# Patient Record
Sex: MALE | Race: WHITE | NOT HISPANIC OR LATINO | Employment: FULL TIME | URBAN - METROPOLITAN AREA
[De-identification: names, ages, dates, MRNs, and addresses within clinical notes are randomized per-mention and may not be internally consistent; named-entity substitution may affect disease eponyms.]

---

## 2017-01-17 RX ORDER — BUSPIRONE HYDROCHLORIDE 10 MG/1
TABLET ORAL
Qty: 90 TABLET | Refills: 0 | Status: SHIPPED | OUTPATIENT
Start: 2017-01-17 | End: 2017-03-16 | Stop reason: SDUPTHER

## 2017-01-19 DIAGNOSIS — E78.5 HYPERLIPIDEMIA: ICD-10-CM

## 2017-01-20 RX ORDER — HYDROCHLOROTHIAZIDE 12.5 MG/1
CAPSULE ORAL
Qty: 90 CAPSULE | Refills: 1 | Status: SHIPPED | OUTPATIENT
Start: 2017-01-20 | End: 2017-07-19 | Stop reason: SDUPTHER

## 2017-02-07 ENCOUNTER — TELEPHONE (OUTPATIENT)
Dept: INTERNAL MEDICINE | Facility: CLINIC | Age: 47
End: 2017-02-07

## 2017-02-07 RX ORDER — ATORVASTATIN CALCIUM 40 MG/1
TABLET, FILM COATED ORAL
Qty: 30 TABLET | Refills: 0 | Status: SHIPPED | OUTPATIENT
Start: 2017-02-07 | End: 2017-04-23 | Stop reason: SDUPTHER

## 2017-02-08 NOTE — TELEPHONE ENCOUNTER
Left message for patient to return call to clinic to schedule an appointment for medication refill

## 2017-02-23 ENCOUNTER — HOSPITAL ENCOUNTER (OUTPATIENT)
Dept: RADIOLOGY | Facility: HOSPITAL | Age: 47
Discharge: HOME | End: 2017-02-23
Attending: FAMILY MEDICINE
Payer: COMMERCIAL

## 2017-02-23 ENCOUNTER — OFFICE VISIT (OUTPATIENT)
Dept: INTERNAL MEDICINE | Facility: CLINIC | Age: 47
End: 2017-02-23
Payer: COMMERCIAL

## 2017-02-23 VITALS
SYSTOLIC BLOOD PRESSURE: 126 MMHG | HEIGHT: 73 IN | BODY MASS INDEX: 33.02 KG/M2 | DIASTOLIC BLOOD PRESSURE: 84 MMHG | WEIGHT: 249.13 LBS | HEART RATE: 96 BPM | TEMPERATURE: 98 F

## 2017-02-23 DIAGNOSIS — E78.5 HYPERLIPIDEMIA, UNSPECIFIED HYPERLIPIDEMIA TYPE: ICD-10-CM

## 2017-02-23 DIAGNOSIS — M54.2 NECK PAIN: ICD-10-CM

## 2017-02-23 DIAGNOSIS — I10 ESSENTIAL HYPERTENSION: ICD-10-CM

## 2017-02-23 DIAGNOSIS — F41.9 ANXIETY: ICD-10-CM

## 2017-02-23 DIAGNOSIS — M54.2 NECK PAIN: Primary | ICD-10-CM

## 2017-02-23 PROCEDURE — 99999 PR PBB SHADOW E&M-EST. PATIENT-LVL III: CPT | Mod: PBBFAC,,, | Performed by: FAMILY MEDICINE

## 2017-02-23 PROCEDURE — 72040 X-RAY EXAM NECK SPINE 2-3 VW: CPT | Mod: TC,PO

## 2017-02-23 PROCEDURE — 99214 OFFICE O/P EST MOD 30 MIN: CPT | Mod: S$GLB,,, | Performed by: FAMILY MEDICINE

## 2017-02-23 PROCEDURE — 72040 X-RAY EXAM NECK SPINE 2-3 VW: CPT | Mod: 26,,, | Performed by: RADIOLOGY

## 2017-02-23 RX ORDER — METHOCARBAMOL 750 MG/1
750 TABLET, FILM COATED ORAL 2 TIMES DAILY
Qty: 60 TABLET | Refills: 0 | Status: SHIPPED | OUTPATIENT
Start: 2017-02-23 | End: 2017-03-05

## 2017-02-23 RX ORDER — CYCLOBENZAPRINE HCL 10 MG
10 TABLET ORAL NIGHTLY
Qty: 30 TABLET | Refills: 0 | Status: SHIPPED | OUTPATIENT
Start: 2017-02-23 | End: 2017-03-05

## 2017-02-23 NOTE — MR AVS SNAPSHOT
[facility]  [address] [facility] [place] [address]  Phone: [phone]  Fax: [phone]                  Sae Santiago   2017 10:20 AM   Office Visit    Description:  Male : 1970   Provider:  Floyd Matthews MD   Department:  [facility]           Reason for Visit     Follow-up     Neck Pain           Diagnoses this Visit        Comments    Neck pain    -  Primary Will get an xray of cervical spine.Will do Methocarbamol during 750 mg bid and Flexeril a night.     Essential hypertension     Will continue with the HCTZ    Anxiety     Will continue the Ativan    Hyperlipidemia, unspecified hyperlipidemia type     Will do lipid panel.           To Do List           Goals (5 Years of Data)     None       These Medications        Disp Refills Start End    cyclobenzaprine (FLEXERIL) 10 MG tablet 30 tablet 0 2017 3/5/2017    Take 1 tablet (10 mg total) by mouth every evening. - Oral    Pharmacy: [facility] [address] [place] [address] [place] [id number] [facility] Ph #: [phone]       methocarbamol (ROBAXIN) 750 MG Tab 60 tablet 0 2017 3/5/2017    Take 1 tablet (750 mg total) by mouth 2 (two) times daily. - Oral    Pharmacy: [address] [place] [id number] [facility] 1 SO. Ph #: [phone]         [facility] On Call     [facility] On Call Nurse Care Line -  Assistance  Registered nurses in the [facility] provide clinical advisement, health education, appointment booking, and other advisory services.  Call for this free service at [phone].             Medications           Message regarding Medications     Verify the changes and/or additions to your medication regime listed below are the same as discussed with your clinician today.  If any of these changes or additions are incorrect, please notify your healthcare provider.        START taking these NEW medications        Refills    cyclobenzaprine (FLEXERIL) 10 MG tablet 0     "Sig: Take 1 tablet (10 mg total) by mouth every evening.    Class: Normal    Route: Oral    methocarbamol (ROBAXIN) 750 MG Tab 0    Sig: Take 1 tablet (750 mg total) by mouth 2 (two) times daily.    Class: Normal    Route: Oral      STOP taking these medications     triamcinolone acetonide 0.1% (KENALOG) 0.1 % cream Apply topically 2 (two) times daily.           Verify that the below list of medications is an accurate representation of the medications you are currently taking.  If none reported, the list may be blank. If incorrect, please contact your healthcare provider. Carry this list with you in case of emergency.           Current Medications     atorvastatin (LIPITOR) 40 MG tablet TAKE ONE TABLET BY MOUTH ONCE DAILY    busPIRone (BUSPAR) 10 MG tablet TAKE ONE TABLET BY MOUTH THREE TIMES DAILY    hydrochlorothiazide (MICROZIDE) 12.5 mg capsule TAKE ONE CAPSULE BY MOUTH ONCE DAILY    lorazepam (ATIVAN) 0.5 MG tablet Take 1 tablet (0.5 mg total) by mouth every evening.    losartan (COZAAR) 100 MG tablet TAKE ONE TABLET BY MOUTH ONCE DAILY    metoprolol succinate (TOPROL-XL) 25 MG 24 hr tablet 1 tablet in am and 1 1/2 tab at night    cyclobenzaprine (FLEXERIL) 10 MG tablet Take 1 tablet (10 mg total) by mouth every evening.    methocarbamol (ROBAXIN) 750 MG Tab Take 1 tablet (750 mg total) by mouth 2 (two) times daily.           Clinical Reference Information           Your Vitals Were     BP Pulse Temp    126/84 (BP Location: Right arm, Patient Position: Sitting, BP Method: Manual) 96 97.6 °F (36.4 °C) (Tympanic)    Height Weight BMI    6' 1" (1.854 m) 113 kg (249 lb 1.9 oz) 32.87 kg/m2      Blood Pressure          Most Recent Value    BP  126/84      Allergies as of 2/23/2017     Lexapro [Escitalopram Oxalate]      Immunizations Administered on Date of Encounter - 2/23/2017     None      Orders Placed During Today's Visit     Future Labs/Procedures Expected by Expires    Comprehensive metabolic panel  2/23/2017 " 4/24/2018    Lipid panel  2/23/2017 2/23/2018    X-Ray Cervical Spine AP And Lateral  2/23/2017 2/23/2018      Instructions    Methocarbamol (daytime)    1 tab in morning and noon. Take it at night to see if it will cause sedation.    Flexeril    1 tab at night.       Language Assistance Services     ATTENTION: Language assistance services are available, free of charge. Please call [phone].      ATENCIÓN: Si habla español, tiene a cordon disposición servicios gratuitos de asistencia lingüística. Llame al [phone].     CHÚ Ý: N?u b?n nói Ti?ng Vi?t, có các d?ch v? h? tr? ngôn ng? mi?n phí dành cho b?n. G?i s? [phone].         [facility] complies with applicable Federal civil rights laws and does not discriminate on the basis of race, color, national origin, age, disability, or sex.

## 2017-02-23 NOTE — PROGRESS NOTES
Subjective:       Patient ID: Sae Santiago is a 46 y.o. male.    Chief Complaint: Follow-up and Neck Pain    Neck Pain    This is a new problem. The current episode started 1 to 4 weeks ago. The problem occurs constantly. The problem has been unchanged. The pain is associated with nothing. The pain is present in the anterior neck. The quality of the pain is described as aching. The pain is at a severity of 7/10. The pain is moderate. Nothing aggravates the symptoms. The pain is worse during the day. Pertinent negatives include no fever, headaches, leg pain or syncope. He has tried nothing for the symptoms. The treatment provided moderate relief.     Review of Systems   Constitutional: Negative.  Negative for fever.   Respiratory: Negative.    Cardiovascular: Negative for syncope.   Genitourinary: Negative.    Musculoskeletal: Positive for neck pain and neck stiffness.   Neurological: Negative.  Negative for headaches.       Objective:      Physical Exam   Constitutional: He appears well-developed and well-nourished.   Cardiovascular: Normal rate.        Assessment:       1. Neck pain    2. Essential hypertension    3. Anxiety    4. Hyperlipidemia, unspecified hyperlipidemia type        Plan:       Neck pain  Comments:  Will get an xray of cervical spine.Will do Methocarbamol during 750 mg bid and Flexeril a night.   Orders:  -     X-Ray Cervical Spine AP And Lateral; Future; Expected date: 2/23/17    Essential hypertension  Comments:  Will continue with the HCTZ  Orders:  -     Comprehensive metabolic panel; Future; Expected date: 2/23/17    Anxiety  Comments:  Will continue the Ativan    Hyperlipidemia, unspecified hyperlipidemia type  Comments:  Will do lipid panel.  Orders:  -     Lipid panel; Future; Expected date: 2/23/17    Other orders  -     cyclobenzaprine (FLEXERIL) 10 MG tablet; Take 1 tablet (10 mg total) by mouth every evening.  Dispense: 30 tablet; Refill: 0  -     methocarbamol (ROBAXIN) 750  MG Tab; Take 1 tablet (750 mg total) by mouth 2 (two) times daily.  Dispense: 60 tablet; Refill: 0

## 2017-02-23 NOTE — PATIENT INSTRUCTIONS
Methocarbamol (daytime)    1 tab in morning and noon. Take it at night to see if it will cause sedation.    Flexeril    1 tab at night.

## 2017-03-06 ENCOUNTER — TELEPHONE (OUTPATIENT)
Dept: INTERNAL MEDICINE | Facility: CLINIC | Age: 47
End: 2017-03-06

## 2017-03-06 DIAGNOSIS — R79.89 ELEVATED LIVER FUNCTION TESTS: Primary | ICD-10-CM

## 2017-03-06 NOTE — TELEPHONE ENCOUNTER
----- Message from Lili Lopez sent at 3/6/2017  4:36 PM CST -----  Contact: Patient   Patient returned call, Please call him at [phone].  Regards to results.    Thanks  Td

## 2017-03-16 RX ORDER — BUSPIRONE HYDROCHLORIDE 10 MG/1
TABLET ORAL
Qty: 90 TABLET | Refills: 2 | Status: SHIPPED | OUTPATIENT
Start: 2017-03-16 | End: 2017-05-09 | Stop reason: SDUPTHER

## 2017-04-06 ENCOUNTER — LAB VISIT (OUTPATIENT)
Dept: LAB | Facility: HOSPITAL | Age: 47
End: 2017-04-06
Attending: FAMILY MEDICINE
Payer: COMMERCIAL

## 2017-04-06 DIAGNOSIS — R79.89 ELEVATED LIVER FUNCTION TESTS: ICD-10-CM

## 2017-04-06 LAB
ALT SERPL W/O P-5'-P-CCNC: 53 U/L
AST SERPL-CCNC: 34 U/L

## 2017-04-06 PROCEDURE — 36415 COLL VENOUS BLD VENIPUNCTURE: CPT | Mod: PO

## 2017-04-06 PROCEDURE — 84450 TRANSFERASE (AST) (SGOT): CPT

## 2017-04-06 PROCEDURE — 80074 ACUTE HEPATITIS PANEL: CPT

## 2017-04-06 PROCEDURE — 84460 ALANINE AMINO (ALT) (SGPT): CPT

## 2017-04-07 LAB
HAV IGM SERPL QL IA: NEGATIVE
HBV CORE IGM SERPL QL IA: NEGATIVE
HBV SURFACE AG SERPL QL IA: NEGATIVE
HCV AB SERPL QL IA: NEGATIVE

## 2017-04-16 RX ORDER — LOSARTAN POTASSIUM 100 MG/1
TABLET ORAL
Qty: 30 TABLET | Refills: 6 | Status: SHIPPED | OUTPATIENT
Start: 2017-04-16 | End: 2017-12-12 | Stop reason: SDUPTHER

## 2017-04-23 RX ORDER — ATORVASTATIN CALCIUM 40 MG/1
TABLET, FILM COATED ORAL
Qty: 90 TABLET | Refills: 3 | Status: SHIPPED | OUTPATIENT
Start: 2017-04-23 | End: 2017-12-12 | Stop reason: SDUPTHER

## 2017-05-09 RX ORDER — METOPROLOL SUCCINATE 25 MG/1
TABLET, EXTENDED RELEASE ORAL
Qty: 225 TABLET | Refills: 2 | OUTPATIENT
Start: 2017-05-09

## 2017-05-09 RX ORDER — BUSPIRONE HYDROCHLORIDE 10 MG/1
10 TABLET ORAL 3 TIMES DAILY
Qty: 270 TABLET | Refills: 2 | Status: SHIPPED | OUTPATIENT
Start: 2017-05-09 | End: 2017-08-11 | Stop reason: SDUPTHER

## 2017-05-09 RX ORDER — METOPROLOL SUCCINATE 25 MG/1
TABLET, EXTENDED RELEASE ORAL
Qty: 135 TABLET | Refills: 3 | Status: SHIPPED | OUTPATIENT
Start: 2017-05-09 | End: 2017-05-09 | Stop reason: SDUPTHER

## 2017-05-09 NOTE — TELEPHONE ENCOUNTER
Please resend metoprolol prescription to the [facility] on [address]. Called [facility] and canceled script with them. Pharmacy updated in chart

## 2017-05-09 NOTE — TELEPHONE ENCOUNTER
----- Message from Elisa Byrnes sent at 5/9/2017  4:10 PM CDT -----  Contact: pt  States he needs to speak to the nurse regarding his prescription for his blood pressure medicine, he would like it sent to [facility]. States he would like for all future prescriptions to go to [facility]. Please call pt at [phone]. Thank you

## 2017-05-10 RX ORDER — METOPROLOL SUCCINATE 25 MG/1
TABLET, EXTENDED RELEASE ORAL
Qty: 135 TABLET | Refills: 3 | Status: SHIPPED | OUTPATIENT
Start: 2017-05-10 | End: 2017-12-12 | Stop reason: SDUPTHER

## 2017-06-30 ENCOUNTER — TELEPHONE (OUTPATIENT)
Dept: INTERNAL MEDICINE | Facility: CLINIC | Age: 47
End: 2017-06-30

## 2017-06-30 NOTE — TELEPHONE ENCOUNTER
----- Message from Cayla Francisco sent at 6/30/2017 10:54 AM CDT -----  Contact: self [phone]  States that he needs to speak to nurse regarding a referral for his neck pain. Please call back at [phone]//thank you acc

## 2017-07-03 ENCOUNTER — PATIENT MESSAGE (OUTPATIENT)
Dept: URGENT CARE | Facility: CLINIC | Age: 47
End: 2017-07-03

## 2017-07-03 DIAGNOSIS — M50.30 DDD (DEGENERATIVE DISC DISEASE), CERVICAL: Primary | ICD-10-CM

## 2017-07-19 DIAGNOSIS — E78.5 HYPERLIPIDEMIA: ICD-10-CM

## 2017-07-19 RX ORDER — HYDROCHLOROTHIAZIDE 12.5 MG/1
CAPSULE ORAL
Qty: 90 CAPSULE | Refills: 3 | Status: SHIPPED | OUTPATIENT
Start: 2017-07-19 | End: 2017-12-12 | Stop reason: SDUPTHER

## 2017-08-11 RX ORDER — BUSPIRONE HYDROCHLORIDE 10 MG/1
TABLET ORAL
Qty: 90 TABLET | Refills: 1 | Status: SHIPPED | OUTPATIENT
Start: 2017-08-11 | End: 2017-10-06 | Stop reason: SDUPTHER

## 2017-10-06 RX ORDER — BUSPIRONE HYDROCHLORIDE 10 MG/1
TABLET ORAL
Qty: 270 TABLET | Refills: 1 | Status: SHIPPED | OUTPATIENT
Start: 2017-10-06 | End: 2017-12-12 | Stop reason: SDUPTHER

## 2017-12-11 ENCOUNTER — PATIENT OUTREACH (OUTPATIENT)
Dept: ADMINISTRATIVE | Facility: HOSPITAL | Age: 47
End: 2017-12-11

## 2017-12-12 ENCOUNTER — LAB VISIT (OUTPATIENT)
Dept: LAB | Facility: HOSPITAL | Age: 47
End: 2017-12-12
Attending: FAMILY MEDICINE
Payer: COMMERCIAL

## 2017-12-12 ENCOUNTER — OFFICE VISIT (OUTPATIENT)
Dept: INTERNAL MEDICINE | Facility: CLINIC | Age: 47
End: 2017-12-12
Payer: COMMERCIAL

## 2017-12-12 VITALS
BODY MASS INDEX: 33.31 KG/M2 | HEART RATE: 65 BPM | HEIGHT: 73 IN | DIASTOLIC BLOOD PRESSURE: 80 MMHG | SYSTOLIC BLOOD PRESSURE: 114 MMHG | TEMPERATURE: 96 F | WEIGHT: 251.31 LBS

## 2017-12-12 DIAGNOSIS — Z00.00 ANNUAL PHYSICAL EXAM: Primary | ICD-10-CM

## 2017-12-12 DIAGNOSIS — Z00.00 ANNUAL PHYSICAL EXAM: ICD-10-CM

## 2017-12-12 DIAGNOSIS — E78.5 HYPERLIPIDEMIA, UNSPECIFIED HYPERLIPIDEMIA TYPE: ICD-10-CM

## 2017-12-12 LAB
ALBUMIN SERPL BCP-MCNC: 3.8 G/DL
ALP SERPL-CCNC: 75 U/L
ALT SERPL W/O P-5'-P-CCNC: 60 U/L
ANION GAP SERPL CALC-SCNC: 10 MMOL/L
AST SERPL-CCNC: 38 U/L
BASOPHILS # BLD AUTO: 0.03 K/UL
BASOPHILS NFR BLD: 0.5 %
BILIRUB SERPL-MCNC: 0.9 MG/DL
BUN SERPL-MCNC: 16 MG/DL
CALCIUM SERPL-MCNC: 9.9 MG/DL
CHLORIDE SERPL-SCNC: 101 MMOL/L
CHOLEST SERPL-MCNC: 163 MG/DL
CHOLEST/HDLC SERPL: 4.9 {RATIO}
CO2 SERPL-SCNC: 29 MMOL/L
COMPLEXED PSA SERPL-MCNC: 1 NG/ML
CREAT SERPL-MCNC: 1.1 MG/DL
DIFFERENTIAL METHOD: NORMAL
EOSINOPHIL # BLD AUTO: 0.1 K/UL
EOSINOPHIL NFR BLD: 1.8 %
ERYTHROCYTE [DISTWIDTH] IN BLOOD BY AUTOMATED COUNT: 12.7 %
EST. GFR  (AFRICAN AMERICAN): >60 ML/MIN/1.73 M^2
EST. GFR  (NON AFRICAN AMERICAN): >60 ML/MIN/1.73 M^2
GLUCOSE SERPL-MCNC: 118 MG/DL
HCT VFR BLD AUTO: 42.8 %
HDLC SERPL-MCNC: 33 MG/DL
HDLC SERPL: 20.2 %
HGB BLD-MCNC: 14.2 G/DL
IMM GRANULOCYTES # BLD AUTO: 0.02 K/UL
IMM GRANULOCYTES NFR BLD AUTO: 0.3 %
LDLC SERPL CALC-MCNC: 68.2 MG/DL
LYMPHOCYTES # BLD AUTO: 1.9 K/UL
LYMPHOCYTES NFR BLD: 31 %
MCH RBC QN AUTO: 27.4 PG
MCHC RBC AUTO-ENTMCNC: 33.2 G/DL
MCV RBC AUTO: 83 FL
MONOCYTES # BLD AUTO: 0.5 K/UL
MONOCYTES NFR BLD: 7.6 %
NEUTROPHILS # BLD AUTO: 3.5 K/UL
NEUTROPHILS NFR BLD: 58.8 %
NONHDLC SERPL-MCNC: 130 MG/DL
NRBC BLD-RTO: 0 /100 WBC
PLATELET # BLD AUTO: 245 K/UL
PMV BLD AUTO: 9.9 FL
POTASSIUM SERPL-SCNC: 4.1 MMOL/L
PROT SERPL-MCNC: 7 G/DL
RBC # BLD AUTO: 5.19 M/UL
SODIUM SERPL-SCNC: 140 MMOL/L
TRIGL SERPL-MCNC: 309 MG/DL
WBC # BLD AUTO: 6.03 K/UL

## 2017-12-12 PROCEDURE — 99999 PR PBB SHADOW E&M-EST. PATIENT-LVL III: CPT | Mod: PBBFAC,,, | Performed by: FAMILY MEDICINE

## 2017-12-12 PROCEDURE — 85025 COMPLETE CBC W/AUTO DIFF WBC: CPT

## 2017-12-12 PROCEDURE — 99396 PREV VISIT EST AGE 40-64: CPT | Mod: S$GLB,,, | Performed by: FAMILY MEDICINE

## 2017-12-12 PROCEDURE — 80061 LIPID PANEL: CPT

## 2017-12-12 PROCEDURE — 80053 COMPREHEN METABOLIC PANEL: CPT

## 2017-12-12 PROCEDURE — 84153 ASSAY OF PSA TOTAL: CPT

## 2017-12-12 PROCEDURE — 36415 COLL VENOUS BLD VENIPUNCTURE: CPT | Mod: PO

## 2017-12-12 RX ORDER — METOPROLOL SUCCINATE 25 MG/1
TABLET, EXTENDED RELEASE ORAL
Qty: 135 TABLET | Refills: 3 | Status: SHIPPED | OUTPATIENT
Start: 2017-12-12 | End: 2018-01-05 | Stop reason: SDUPTHER

## 2017-12-12 RX ORDER — ATORVASTATIN CALCIUM 40 MG/1
40 TABLET, FILM COATED ORAL DAILY
Qty: 90 TABLET | Refills: 3 | Status: SHIPPED | OUTPATIENT
Start: 2017-12-12 | End: 2018-01-05 | Stop reason: SDUPTHER

## 2017-12-12 RX ORDER — LORAZEPAM 0.5 MG/1
0.5 TABLET ORAL NIGHTLY
Qty: 30 TABLET | Refills: 0 | Status: SHIPPED | OUTPATIENT
Start: 2017-12-12 | End: 2018-01-05 | Stop reason: SDUPTHER

## 2017-12-12 RX ORDER — HYDROCHLOROTHIAZIDE 12.5 MG/1
12.5 CAPSULE ORAL DAILY
Qty: 90 CAPSULE | Refills: 3 | Status: SHIPPED | OUTPATIENT
Start: 2017-12-12 | End: 2018-01-05 | Stop reason: SDUPTHER

## 2017-12-12 RX ORDER — BUSPIRONE HYDROCHLORIDE 10 MG/1
10 TABLET ORAL 3 TIMES DAILY
Qty: 270 TABLET | Refills: 2 | Status: SHIPPED | OUTPATIENT
Start: 2017-12-12 | End: 2018-01-05 | Stop reason: SDUPTHER

## 2017-12-12 RX ORDER — LOSARTAN POTASSIUM 100 MG/1
100 TABLET ORAL DAILY
Qty: 90 TABLET | Refills: 3 | Status: SHIPPED | OUTPATIENT
Start: 2017-12-12 | End: 2018-01-05 | Stop reason: SDUPTHER

## 2018-01-05 ENCOUNTER — PATIENT MESSAGE (OUTPATIENT)
Dept: INTERNAL MEDICINE | Facility: CLINIC | Age: 48
End: 2018-01-05

## 2018-01-05 DIAGNOSIS — E78.5 HYPERLIPIDEMIA, UNSPECIFIED HYPERLIPIDEMIA TYPE: ICD-10-CM

## 2018-01-05 RX ORDER — BUSPIRONE HYDROCHLORIDE 10 MG/1
10 TABLET ORAL 3 TIMES DAILY
Qty: 270 TABLET | Refills: 2 | Status: SHIPPED | OUTPATIENT
Start: 2018-01-05 | End: 2018-12-12 | Stop reason: SDUPTHER

## 2018-01-05 RX ORDER — METOPROLOL SUCCINATE 25 MG/1
TABLET, EXTENDED RELEASE ORAL
Qty: 135 TABLET | Refills: 3 | Status: SHIPPED | OUTPATIENT
Start: 2018-01-05 | End: 2018-12-12 | Stop reason: SDUPTHER

## 2018-01-05 RX ORDER — LORAZEPAM 0.5 MG/1
0.5 TABLET ORAL NIGHTLY
Qty: 30 TABLET | Refills: 0 | Status: SHIPPED | OUTPATIENT
Start: 2018-01-05 | End: 2018-12-12 | Stop reason: SDUPTHER

## 2018-01-05 RX ORDER — LOSARTAN POTASSIUM 100 MG/1
100 TABLET ORAL DAILY
Qty: 90 TABLET | Refills: 3 | Status: SHIPPED | OUTPATIENT
Start: 2018-01-05 | End: 2018-12-12 | Stop reason: SDUPTHER

## 2018-01-05 RX ORDER — HYDROCHLOROTHIAZIDE 12.5 MG/1
12.5 CAPSULE ORAL DAILY
Qty: 90 CAPSULE | Refills: 3 | Status: SHIPPED | OUTPATIENT
Start: 2018-01-05 | End: 2018-12-12 | Stop reason: SDUPTHER

## 2018-01-05 RX ORDER — ATORVASTATIN CALCIUM 40 MG/1
40 TABLET, FILM COATED ORAL DAILY
Qty: 90 TABLET | Refills: 3 | Status: SHIPPED | OUTPATIENT
Start: 2018-01-05 | End: 2018-12-12 | Stop reason: SDUPTHER

## 2018-03-19 PROBLEM — Z00.00 ANNUAL PHYSICAL EXAM: Status: RESOLVED | Noted: 2017-12-12 | Resolved: 2018-03-19

## 2018-11-15 ENCOUNTER — OFFICE VISIT (OUTPATIENT)
Dept: DERMATOLOGY | Facility: CLINIC | Age: 48
End: 2018-11-15
Payer: COMMERCIAL

## 2018-11-15 DIAGNOSIS — L82.1 SEBORRHEIC KERATOSIS: Primary | ICD-10-CM

## 2018-11-15 DIAGNOSIS — L82.1 STUCCO KERATOSES: ICD-10-CM

## 2018-11-15 DIAGNOSIS — B07.8 OTHER VIRAL WARTS: ICD-10-CM

## 2018-11-15 DIAGNOSIS — L57.0 ACTINIC KERATOSES: ICD-10-CM

## 2018-11-15 PROCEDURE — 17000 DESTRUCT PREMALG LESION: CPT | Mod: 59,S$GLB,, | Performed by: DERMATOLOGY

## 2018-11-15 PROCEDURE — 99203 OFFICE O/P NEW LOW 30 MIN: CPT | Mod: 25,S$GLB,, | Performed by: DERMATOLOGY

## 2018-11-15 PROCEDURE — 17110 DESTRUCTION B9 LES UP TO 14: CPT | Mod: S$GLB,,, | Performed by: DERMATOLOGY

## 2018-11-15 PROCEDURE — 99999 PR PBB SHADOW E&M-EST. PATIENT-LVL II: CPT | Mod: PBBFAC,,, | Performed by: DERMATOLOGY

## 2018-11-15 RX ORDER — AMMONIUM LACTATE 12 G/100G
LOTION TOPICAL
Qty: 225 G | Refills: 11 | Status: ON HOLD | OUTPATIENT
Start: 2018-11-15 | End: 2020-01-10 | Stop reason: HOSPADM

## 2018-11-15 NOTE — PROGRESS NOTES
Subjective:       Patient ID:  Sae Santiago is a 48 y.o. male who presents for   Chief Complaint   Patient presents with    Skin Check     Hx of multiple nevi, SK's, VV, and AK's, last seen on 11/20/14.     History of Present Illness: The patient presents with chief complaint of annual skin check.  Location: face  Duration: 2 years  Signs/Symptoms: scaly    Prior treatments: moisturizer     The patient denies personal history of skin cancer. Father with hx of skin CA.             Review of Systems   Constitutional: Negative for fever and chills.   Gastrointestinal: Negative for nausea and vomiting.   Skin: Negative for daily sunscreen use, activity-related sunscreen use and recent sunburn.   Hematologic/Lymphatic: Does not bruise/bleed easily.        Objective:    Physical Exam   Constitutional: He appears well-developed and well-nourished. No distress.   Neurological: He is alert and oriented to person, place, and time. He is not disoriented.   Psychiatric: He has a normal mood and affect.   Skin:   Areas Examined (abnormalities noted in diagram):   Scalp / Hair Palpated and Inspected  Head / Face Inspection Performed  Neck Inspection Performed  Chest / Axilla Inspection Performed  Abdomen Inspection Performed  Back Inspection Performed  RUE Inspected  LUE Inspection Performed  RLE Inspected  LLE Inspection Performed  Nails and Digits Inspection Performed                   Diagram Legend     Erythematous scaling macule/papule c/w actinic keratosis       Vascular papule c/w angioma      Pigmented verrucoid papule/plaque c/w seborrheic keratosis      Yellow umbilicated papule c/w sebaceous hyperplasia      Irregularly shaped tan macule c/w lentigo     1-2 mm smooth white papules consistent with Milia      Movable subcutaneous cyst with punctum c/w epidermal inclusion cyst      Subcutaneous movable cyst c/w pilar cyst      Firm pink to brown papule c/w dermatofibroma      Pedunculated fleshy papule(s) c/w  skin tag(s)      Evenly pigmented macule c/w junctional nevus     Mildly variegated pigmented, slightly irregular-bordered macule c/w mildly atypical nevus      Flesh colored to evenly pigmented papule c/w intradermal nevus       Pink pearly papule/plaque c/w basal cell carcinoma      Erythematous hyperkeratotic cursted plaque c/w SCC      Surgical scar with no sign of skin cancer recurrence      Open and closed comedones      Inflammatory papules and pustules      Verrucoid papule consistent consistent with wart     Erythematous eczematous patches and plaques     Dystrophic onycholytic nail with subungual debris c/w onychomycosis     Umbilicated papule    Erythematous-base heme-crusted tan verrucoid plaque consistent with inflamed seborrheic keratosis     Erythematous Silvery Scaling Plaque c/w Psoriasis     See annotation      Assessment / Plan:        Seborrheic keratosis  Reassurance given. Discussed diagnosis and that lesions are benign.  AAD handout given.     Actinic keratoses  Cryosurgery Procedure Note    The patient is informed of the precancerous quality and need for treatment of these lesions. After risks, benefits and alternatives explained, including blistering, pain, hyper- and hypopigmentation, patient verbally consents to cryotherapy to precancerous lesions. Liquid nitrogen cryosurgery is applied to the 1 actinic keratoses, as detailed in the physical exam, to produce a freeze injury. The patient is aware that blisters may form and is instructed on wound care with gentle cleansing and use of vaseline ointment to keep moist until healed. The patient is supplied a handout on cryosurgery and is instructed to call if lesions do not completely resolve.    Stucco keratoses  -     ammonium lactate (AMLACTIN) 12 % lotion; Use daily.  Apply to damp skin after bathing.  Dispense: 225 g; Refill: 11  -      Discussed dx, AVS given.    Other viral warts  Cryosurgery procedure note:    After risks, benefits, and  alternatives discussed, including blistering, pain, scar, recurrence, allergy to anesthesia (if given), hyper- and hypopigmentation, verbal consent from the patient is obtained.  Liquid nitrogen cryosurgery is applied to 3 verruca with prior paring, as detailed in the physical exam, to produce a freeze injury. 2 consecutive freeze thaw cycles are applied to each verruca. The patient is aware that blisters (possibly blood blisters) may form.             Follow-up in about 1 year (around 11/15/2019).

## 2018-11-15 NOTE — PATIENT INSTRUCTIONS

## 2018-12-12 ENCOUNTER — LAB VISIT (OUTPATIENT)
Dept: LAB | Facility: HOSPITAL | Age: 48
End: 2018-12-12
Attending: FAMILY MEDICINE
Payer: COMMERCIAL

## 2018-12-12 ENCOUNTER — OFFICE VISIT (OUTPATIENT)
Dept: INTERNAL MEDICINE | Facility: CLINIC | Age: 48
End: 2018-12-12
Payer: COMMERCIAL

## 2018-12-12 VITALS
SYSTOLIC BLOOD PRESSURE: 134 MMHG | WEIGHT: 252.19 LBS | DIASTOLIC BLOOD PRESSURE: 89 MMHG | OXYGEN SATURATION: 98 % | HEART RATE: 67 BPM | HEIGHT: 73 IN | BODY MASS INDEX: 33.42 KG/M2 | TEMPERATURE: 97 F

## 2018-12-12 DIAGNOSIS — Z00.00 ANNUAL PHYSICAL EXAM: ICD-10-CM

## 2018-12-12 DIAGNOSIS — E78.5 HYPERLIPIDEMIA, UNSPECIFIED HYPERLIPIDEMIA TYPE: ICD-10-CM

## 2018-12-12 DIAGNOSIS — Z00.00 ANNUAL PHYSICAL EXAM: Primary | ICD-10-CM

## 2018-12-12 LAB
ALBUMIN SERPL BCP-MCNC: 4.1 G/DL
ALP SERPL-CCNC: 70 U/L
ALT SERPL W/O P-5'-P-CCNC: 70 U/L
ANION GAP SERPL CALC-SCNC: 9 MMOL/L
AST SERPL-CCNC: 41 U/L
BASOPHILS # BLD AUTO: 0.04 K/UL
BASOPHILS NFR BLD: 0.7 %
BILIRUB SERPL-MCNC: 0.9 MG/DL
BUN SERPL-MCNC: 17 MG/DL
CALCIUM SERPL-MCNC: 10.4 MG/DL
CHLORIDE SERPL-SCNC: 98 MMOL/L
CHOLEST SERPL-MCNC: 181 MG/DL
CHOLEST/HDLC SERPL: 4.4 {RATIO}
CO2 SERPL-SCNC: 31 MMOL/L
COMPLEXED PSA SERPL-MCNC: 1.2 NG/ML
CREAT SERPL-MCNC: 1.3 MG/DL
DIFFERENTIAL METHOD: NORMAL
EOSINOPHIL # BLD AUTO: 0.1 K/UL
EOSINOPHIL NFR BLD: 1.6 %
ERYTHROCYTE [DISTWIDTH] IN BLOOD BY AUTOMATED COUNT: 12.9 %
EST. GFR  (AFRICAN AMERICAN): >60 ML/MIN/1.73 M^2
EST. GFR  (NON AFRICAN AMERICAN): >60 ML/MIN/1.73 M^2
GLUCOSE SERPL-MCNC: 124 MG/DL
HCT VFR BLD AUTO: 45.8 %
HDLC SERPL-MCNC: 41 MG/DL
HDLC SERPL: 22.7 %
HGB BLD-MCNC: 15.3 G/DL
IMM GRANULOCYTES # BLD AUTO: 0.02 K/UL
IMM GRANULOCYTES NFR BLD AUTO: 0.3 %
LDLC SERPL CALC-MCNC: 84.2 MG/DL
LYMPHOCYTES # BLD AUTO: 2 K/UL
LYMPHOCYTES NFR BLD: 31.8 %
MCH RBC QN AUTO: 28.6 PG
MCHC RBC AUTO-ENTMCNC: 33.4 G/DL
MCV RBC AUTO: 86 FL
MONOCYTES # BLD AUTO: 0.4 K/UL
MONOCYTES NFR BLD: 6.8 %
NEUTROPHILS # BLD AUTO: 3.6 K/UL
NEUTROPHILS NFR BLD: 58.8 %
NONHDLC SERPL-MCNC: 140 MG/DL
NRBC BLD-RTO: 0 /100 WBC
PLATELET # BLD AUTO: 247 K/UL
PMV BLD AUTO: 10 FL
POTASSIUM SERPL-SCNC: 4.4 MMOL/L
PROT SERPL-MCNC: 7.7 G/DL
RBC # BLD AUTO: 5.35 M/UL
SODIUM SERPL-SCNC: 138 MMOL/L
TRIGL SERPL-MCNC: 279 MG/DL
WBC # BLD AUTO: 6.14 K/UL

## 2018-12-12 PROCEDURE — 3075F SYST BP GE 130 - 139MM HG: CPT | Mod: CPTII,S$GLB,, | Performed by: FAMILY MEDICINE

## 2018-12-12 PROCEDURE — 84153 ASSAY OF PSA TOTAL: CPT

## 2018-12-12 PROCEDURE — 99396 PREV VISIT EST AGE 40-64: CPT | Mod: S$GLB,,, | Performed by: FAMILY MEDICINE

## 2018-12-12 PROCEDURE — 36415 COLL VENOUS BLD VENIPUNCTURE: CPT | Mod: PO

## 2018-12-12 PROCEDURE — 3079F DIAST BP 80-89 MM HG: CPT | Mod: CPTII,S$GLB,, | Performed by: FAMILY MEDICINE

## 2018-12-12 PROCEDURE — 85025 COMPLETE CBC W/AUTO DIFF WBC: CPT

## 2018-12-12 PROCEDURE — 80061 LIPID PANEL: CPT

## 2018-12-12 PROCEDURE — 80053 COMPREHEN METABOLIC PANEL: CPT

## 2018-12-12 PROCEDURE — 99999 PR PBB SHADOW E&M-EST. PATIENT-LVL III: CPT | Mod: PBBFAC,,, | Performed by: FAMILY MEDICINE

## 2018-12-12 RX ORDER — METOPROLOL SUCCINATE 25 MG/1
TABLET, EXTENDED RELEASE ORAL
Qty: 135 TABLET | Refills: 3 | Status: SHIPPED | OUTPATIENT
Start: 2018-12-12 | End: 2020-02-28

## 2018-12-12 RX ORDER — LOSARTAN POTASSIUM 100 MG/1
100 TABLET ORAL DAILY
Qty: 90 TABLET | Refills: 3 | Status: SHIPPED | OUTPATIENT
Start: 2018-12-12 | End: 2020-02-28

## 2018-12-12 RX ORDER — LORAZEPAM 0.5 MG/1
0.5 TABLET ORAL NIGHTLY
Qty: 30 TABLET | Refills: 0 | Status: SHIPPED | OUTPATIENT
Start: 2018-12-12 | End: 2019-12-17 | Stop reason: SDUPTHER

## 2018-12-12 RX ORDER — HYDROCHLOROTHIAZIDE 12.5 MG/1
12.5 CAPSULE ORAL DAILY
Qty: 90 CAPSULE | Refills: 3 | Status: SHIPPED | OUTPATIENT
Start: 2018-12-12 | End: 2020-01-06

## 2018-12-12 RX ORDER — AMOXICILLIN 500 MG
1 CAPSULE ORAL DAILY
Status: ON HOLD | COMMUNITY
End: 2020-01-10 | Stop reason: HOSPADM

## 2018-12-12 RX ORDER — CETIRIZINE HYDROCHLORIDE 10 MG/1
10 TABLET ORAL DAILY
COMMUNITY
End: 2021-02-25

## 2018-12-12 RX ORDER — BUSPIRONE HYDROCHLORIDE 10 MG/1
10 TABLET ORAL 3 TIMES DAILY
Qty: 270 TABLET | Refills: 2 | Status: SHIPPED | OUTPATIENT
Start: 2018-12-12 | End: 2020-05-13

## 2018-12-12 RX ORDER — MULTIVITAMIN
1 TABLET ORAL DAILY
COMMUNITY

## 2018-12-12 RX ORDER — ATORVASTATIN CALCIUM 40 MG/1
40 TABLET, FILM COATED ORAL DAILY
Qty: 90 TABLET | Refills: 3 | Status: SHIPPED | OUTPATIENT
Start: 2018-12-12 | End: 2020-01-06

## 2018-12-12 NOTE — PROGRESS NOTES
Subjective:       Patient ID: Sae Santiago is a 48 y.o. male.    Chief Complaint: Annual Exam          Pt is a 48 year old here for annual exam. Pt does have history of anxiety and HTN with both well controlled. Pt will need colonoscopy next year.       Review of Systems   Constitutional: Negative.    HENT: Negative.    Respiratory: Negative.    Cardiovascular: Negative.    Gastrointestinal: Negative.    Skin: Negative.    Neurological: Negative.    Psychiatric/Behavioral: Negative.        Objective:      Physical Exam   Constitutional: He is oriented to person, place, and time. He appears well-developed and well-nourished.   HENT:   Head: Normocephalic.   Eyes: EOM are normal. Pupils are equal, round, and reactive to light.   Neck: Normal range of motion. Neck supple. No JVD present. No thyromegaly present.   Cardiovascular: Normal rate and regular rhythm. Exam reveals no friction rub.   No murmur heard.  Pulmonary/Chest: Effort normal and breath sounds normal. He has no wheezes. He has no rales.   Abdominal: Soft. Bowel sounds are normal. There is no tenderness. There is no guarding.   Musculoskeletal: Normal range of motion.   Lymphadenopathy:     He has no cervical adenopathy.   Neurological: He is alert and oriented to person, place, and time. He has normal reflexes. He displays normal reflexes. He exhibits normal muscle tone.   Skin: Skin is warm and dry.   Psychiatric: He has a normal mood and affect. His behavior is normal.       Assessment:       1. Annual physical exam        Plan:       Annual physical exam  Comments:  Will do CBC, CMP, lipid and PSA  Orders:  -     CBC auto differential; Future; Expected date: 12/12/2018  -     Comprehensive metabolic panel; Future; Expected date: 12/12/2018  -     Lipid panel; Future; Expected date: 12/12/2018  -     PSA, Screening; Future; Expected date: 12/12/2018

## 2019-01-04 DIAGNOSIS — K76.0 FATTY LIVER: Primary | ICD-10-CM

## 2019-03-18 PROBLEM — Z00.00 ANNUAL PHYSICAL EXAM: Status: RESOLVED | Noted: 2017-12-12 | Resolved: 2019-03-18

## 2019-05-22 ENCOUNTER — TELEPHONE (OUTPATIENT)
Dept: GASTROENTEROLOGY | Facility: CLINIC | Age: 49
End: 2019-05-22

## 2019-05-31 ENCOUNTER — LAB VISIT (OUTPATIENT)
Dept: LAB | Facility: HOSPITAL | Age: 49
End: 2019-05-31
Attending: INTERNAL MEDICINE
Payer: COMMERCIAL

## 2019-05-31 ENCOUNTER — OFFICE VISIT (OUTPATIENT)
Dept: GASTROENTEROLOGY | Facility: CLINIC | Age: 49
End: 2019-05-31
Payer: COMMERCIAL

## 2019-05-31 VITALS
DIASTOLIC BLOOD PRESSURE: 74 MMHG | WEIGHT: 227.06 LBS | HEART RATE: 70 BPM | BODY MASS INDEX: 30.09 KG/M2 | SYSTOLIC BLOOD PRESSURE: 140 MMHG | HEIGHT: 73 IN

## 2019-05-31 DIAGNOSIS — K76.0 FATTY LIVER: ICD-10-CM

## 2019-05-31 DIAGNOSIS — R79.89 ABNORMAL LFTS: Primary | ICD-10-CM

## 2019-05-31 DIAGNOSIS — R79.89 ABNORMAL LFTS: ICD-10-CM

## 2019-05-31 PROCEDURE — 86038 ANTINUCLEAR ANTIBODIES: CPT

## 2019-05-31 PROCEDURE — 3008F BODY MASS INDEX DOCD: CPT | Mod: CPTII,S$GLB,, | Performed by: INTERNAL MEDICINE

## 2019-05-31 PROCEDURE — 82103 ALPHA-1-ANTITRYPSIN TOTAL: CPT

## 2019-05-31 PROCEDURE — 86235 NUCLEAR ANTIGEN ANTIBODY: CPT

## 2019-05-31 PROCEDURE — 3008F PR BODY MASS INDEX (BMI) DOCUMENTED: ICD-10-PCS | Mod: CPTII,S$GLB,, | Performed by: INTERNAL MEDICINE

## 2019-05-31 PROCEDURE — 99999 PR PBB SHADOW E&M-EST. PATIENT-LVL III: ICD-10-PCS | Mod: PBBFAC,,, | Performed by: INTERNAL MEDICINE

## 2019-05-31 PROCEDURE — 99204 PR OFFICE/OUTPT VISIT, NEW, LEVL IV, 45-59 MIN: ICD-10-PCS | Mod: S$GLB,,, | Performed by: INTERNAL MEDICINE

## 2019-05-31 PROCEDURE — 82728 ASSAY OF FERRITIN: CPT

## 2019-05-31 PROCEDURE — 86790 VIRUS ANTIBODY NOS: CPT

## 2019-05-31 PROCEDURE — 3078F DIAST BP <80 MM HG: CPT | Mod: CPTII,S$GLB,, | Performed by: INTERNAL MEDICINE

## 2019-05-31 PROCEDURE — 86706 HEP B SURFACE ANTIBODY: CPT

## 2019-05-31 PROCEDURE — 36415 COLL VENOUS BLD VENIPUNCTURE: CPT

## 2019-05-31 PROCEDURE — 3078F PR MOST RECENT DIASTOLIC BLOOD PRESSURE < 80 MM HG: ICD-10-PCS | Mod: CPTII,S$GLB,, | Performed by: INTERNAL MEDICINE

## 2019-05-31 PROCEDURE — 3077F PR MOST RECENT SYSTOLIC BLOOD PRESSURE >= 140 MM HG: ICD-10-PCS | Mod: CPTII,S$GLB,, | Performed by: INTERNAL MEDICINE

## 2019-05-31 PROCEDURE — 82390 ASSAY OF CERULOPLASMIN: CPT

## 2019-05-31 PROCEDURE — 86256 FLUORESCENT ANTIBODY TITER: CPT | Mod: 91

## 2019-05-31 PROCEDURE — 85025 COMPLETE CBC W/AUTO DIFF WBC: CPT

## 2019-05-31 PROCEDURE — 83540 ASSAY OF IRON: CPT

## 2019-05-31 PROCEDURE — 99204 OFFICE O/P NEW MOD 45 MIN: CPT | Mod: S$GLB,,, | Performed by: INTERNAL MEDICINE

## 2019-05-31 PROCEDURE — 3077F SYST BP >= 140 MM HG: CPT | Mod: CPTII,S$GLB,, | Performed by: INTERNAL MEDICINE

## 2019-05-31 PROCEDURE — 80053 COMPREHEN METABOLIC PANEL: CPT

## 2019-05-31 PROCEDURE — 99999 PR PBB SHADOW E&M-EST. PATIENT-LVL III: CPT | Mod: PBBFAC,,, | Performed by: INTERNAL MEDICINE

## 2019-05-31 NOTE — LETTER
June 5, 2019      Floyd Matthews MD  [address] [facility] [address]           [facility]  [address] [place] [address]  Phone: [phone]  Fax: [phone]          Patient: Sae Santiago   MR Number: [id number]   YOB: 1970   Date of Visit: 5/31/2019       Dear Dr. Floyd Matthews:    Thank you for referring Sae Santiago to me for evaluation. Attached you will find relevant portions of my assessment and plan of care.    If you have questions, please do not hesitate to call me. I look forward to following Sae Santiago along with you.    Sincerely,    Erendira Wilcox MD    Enclosure  CC:  No Recipients    If you would like to receive this communication electronically, please contact [email or web address] or [phone] to request more information on [facility] Link access.    For providers and/or their staff who would like to refer a patient to [facility], please contact us through our one-stop-shop provider referral line, [facility], at [phone].    If you feel you have received this communication in error or would no longer like to receive these types of communications, please e-mail [email or web address]

## 2019-06-01 LAB
A1AT SERPL-MCNC: 130 MG/DL (ref 100–190)
ALBUMIN SERPL BCP-MCNC: 4.2 G/DL (ref 3.5–5.2)
ALP SERPL-CCNC: 64 U/L (ref 55–135)
ALT SERPL W/O P-5'-P-CCNC: 20 U/L (ref 10–44)
ANION GAP SERPL CALC-SCNC: 10 MMOL/L (ref 8–16)
AST SERPL-CCNC: 20 U/L (ref 10–40)
BASOPHILS # BLD AUTO: 0.03 K/UL (ref 0–0.2)
BASOPHILS NFR BLD: 0.5 % (ref 0–1.9)
BILIRUB SERPL-MCNC: 0.3 MG/DL (ref 0.1–1)
BUN SERPL-MCNC: 25 MG/DL (ref 6–20)
CALCIUM SERPL-MCNC: 10.3 MG/DL (ref 8.7–10.5)
CERULOPLASMIN SERPL-MCNC: 27 MG/DL (ref 15–45)
CHLORIDE SERPL-SCNC: 103 MMOL/L (ref 95–110)
CO2 SERPL-SCNC: 26 MMOL/L (ref 23–29)
CREAT SERPL-MCNC: 1 MG/DL (ref 0.5–1.4)
DIFFERENTIAL METHOD: NORMAL
EOSINOPHIL # BLD AUTO: 0.1 K/UL (ref 0–0.5)
EOSINOPHIL NFR BLD: 1.7 % (ref 0–8)
ERYTHROCYTE [DISTWIDTH] IN BLOOD BY AUTOMATED COUNT: 13.1 % (ref 11.5–14.5)
EST. GFR  (AFRICAN AMERICAN): >60 ML/MIN/1.73 M^2
EST. GFR  (NON AFRICAN AMERICAN): >60 ML/MIN/1.73 M^2
FERRITIN SERPL-MCNC: 127 NG/ML (ref 20–300)
GLUCOSE SERPL-MCNC: 91 MG/DL (ref 70–110)
HCT VFR BLD AUTO: 44 % (ref 40–54)
HGB BLD-MCNC: 14.2 G/DL (ref 14–18)
IMM GRANULOCYTES # BLD AUTO: 0.02 K/UL (ref 0–0.04)
IMM GRANULOCYTES NFR BLD AUTO: 0.3 % (ref 0–0.5)
IRON SERPL-MCNC: 49 UG/DL (ref 45–160)
LYMPHOCYTES # BLD AUTO: 2.1 K/UL (ref 1–4.8)
LYMPHOCYTES NFR BLD: 31.5 % (ref 18–48)
MCH RBC QN AUTO: 27.7 PG (ref 27–31)
MCHC RBC AUTO-ENTMCNC: 32.3 G/DL (ref 32–36)
MCV RBC AUTO: 86 FL (ref 82–98)
MONOCYTES # BLD AUTO: 0.5 K/UL (ref 0.3–1)
MONOCYTES NFR BLD: 7.1 % (ref 4–15)
NEUTROPHILS # BLD AUTO: 3.9 K/UL (ref 1.8–7.7)
NEUTROPHILS NFR BLD: 58.9 % (ref 38–73)
NRBC BLD-RTO: 0 /100 WBC
PLATELET # BLD AUTO: 259 K/UL (ref 150–350)
PMV BLD AUTO: 10.7 FL (ref 9.2–12.9)
POTASSIUM SERPL-SCNC: 4 MMOL/L (ref 3.5–5.1)
PROT SERPL-MCNC: 7.2 G/DL (ref 6–8.4)
RBC # BLD AUTO: 5.13 M/UL (ref 4.6–6.2)
SATURATED IRON: 14 % (ref 20–50)
SODIUM SERPL-SCNC: 139 MMOL/L (ref 136–145)
TOTAL IRON BINDING CAPACITY: 358 UG/DL (ref 250–450)
TRANSFERRIN SERPL-MCNC: 242 MG/DL (ref 200–375)
WBC # BLD AUTO: 6.66 K/UL (ref 3.9–12.7)

## 2019-06-03 LAB
ANA SER QL IF: NORMAL
HBV SURFACE AB SER-ACNC: NEGATIVE M[IU]/ML
HEPATITIS A ANTIBODY, IGG: NEGATIVE
MITOCHONDRIA AB TITR SER IF: NORMAL {TITER}

## 2019-06-04 LAB — SMOOTH MUSCLE AB TITR SER IF: NORMAL {TITER}

## 2019-06-05 NOTE — PROGRESS NOTES
Subjective:     Sae Santiago is here for evaluation of fatty liver.    HPI  Sae Santiago is here for evaluation of possible fatty liver disease. He's had chronic elevation of LFTs for over a year.     Metabolic issues- obesity, dyslipidemia  EtOH-no significant EtOH    Overall he feels well. He noticed that his weight was too high.  He has already lost around 50 lb.  He is continue to work on additional weight loss.    Review of Systems   Constitutional: Negative.    HENT: Negative.    Eyes: Negative.    Respiratory: Negative.    Cardiovascular: Negative.    Gastrointestinal: Negative.    Genitourinary: Negative.    Musculoskeletal: Negative.    Skin: Negative.    Neurological: Negative.    Psychiatric/Behavioral: Negative.        Objective:     Physical Exam   Constitutional: He is oriented to person, place, and time. He appears well-developed and well-nourished. No distress.   HENT:   Head: Normocephalic and atraumatic.   Mouth/Throat: Oropharynx is clear and moist. No oropharyngeal exudate.   Eyes: Pupils are equal, round, and reactive to light. Conjunctivae are normal. Right eye exhibits no discharge. Left eye exhibits no discharge. No scleral icterus.   Pulmonary/Chest: Effort normal and breath sounds normal. No respiratory distress. He has no wheezes.   Abdominal: Soft. He exhibits no distension. There is no tenderness.   Musculoskeletal: He exhibits no edema.   Neurological: He is alert and oriented to person, place, and time.   Psychiatric: He has a normal mood and affect. His behavior is normal.   Vitals reviewed.      Computed MELD-Na score unavailable. Necessary lab results were not found in the last year.  Computed MELD score unavailable. Necessary lab results were not found in the last year.    WBC   Date Value Ref Range Status   05/31/2019 6.66 3.90 - 12.70 K/uL Final     Hemoglobin   Date Value Ref Range Status   05/31/2019 14.2 14.0 - 18.0 g/dL Final     Hematocrit   Date Value Ref  Range Status   05/31/2019 44.0 40.0 - 54.0 % Final     Platelets   Date Value Ref Range Status   05/31/2019 259 150 - 350 K/uL Final     BUN, Bld   Date Value Ref Range Status   05/31/2019 25 (H) 6 - 20 mg/dL Final     Creatinine   Date Value Ref Range Status   05/31/2019 1.0 0.5 - 1.4 mg/dL Final     Glucose   Date Value Ref Range Status   05/31/2019 91 70 - 110 mg/dL Final     Calcium   Date Value Ref Range Status   05/31/2019 10.3 8.7 - 10.5 mg/dL Final     Sodium   Date Value Ref Range Status   05/31/2019 139 136 - 145 mmol/L Final     Potassium   Date Value Ref Range Status   05/31/2019 4.0 3.5 - 5.1 mmol/L Final     Chloride   Date Value Ref Range Status   05/31/2019 103 95 - 110 mmol/L Final     AST   Date Value Ref Range Status   05/31/2019 20 10 - 40 U/L Final     ALT   Date Value Ref Range Status   05/31/2019 20 10 - 44 U/L Final     Alkaline Phosphatase   Date Value Ref Range Status   05/31/2019 64 55 - 135 U/L Final     Total Bilirubin   Date Value Ref Range Status   05/31/2019 0.3 0.1 - 1.0 mg/dL Final     Comment:     For infants and newborns, interpretation of results should be based  on gestational age, weight and in agreement with clinical  observations.  Premature Infant recommended reference ranges:  Up to 24 hours.............<8.0 mg/dL  Up to 48 hours............<12.0 mg/dL  3-5 days..................<15.0 mg/dL  6-29 days.................<15.0 mg/dL       Albumin   Date Value Ref Range Status   05/31/2019 4.2 3.5 - 5.2 g/dL Final     No results found for: INR      Assessment/Plan:     1. Abnormal LFTs    2. Fatty liver      Sae Santiago is a 48 y.o. male withFatty Liver      Erendira Wilcox MD      Abnormal LFTs  -     Comprehensive metabolic panel; Future; Expected date: 05/31/2019  -     CBC auto differential; Future; Expected date: 05/31/2019  -     Ferritin; Future; Expected date: 05/31/2019  -     Iron and TIBC; Future; Expected date: 05/31/2019  -     Hepatitis B surface antibody;  Future; Expected date: 05/31/2019  -     US Abdomen Limited; Future; Expected date: 05/31/2019  -     Anti-smooth muscle antibody; Future; Expected date: 05/31/2019  -     JORDIN Screen w/Reflex; Future; Expected date: 05/31/2019  -     Ceruloplasmin; Future; Expected date: 05/31/2019  -     Antimitochondrial antibody; Future; Expected date: 05/31/2019  -     Alpha-1-antitrypsin; Future; Expected date: 05/31/2019  -     Hepatitis A antibody, IgG; Future; Expected date: 05/31/2019  -     US Elastography Liver; Future; Expected date: 05/31/2019  -     Hepatic function panel; Future; Expected date: 05/31/2019    Fatty liver- diagnosis of exclusion, need to make sure additional eval complete if LFTs have been abnormal  - Educated patient on spectrum of fatty liver disease and potential for cirrhosis if CEDEÑO present  - Advised weight loss (10%), strict glycemic control and lipid control (statins are ok if needed, prescribing doctor will need to monitor LFTs per routine)  -     Comprehensive metabolic panel; Future; Expected date: 05/31/2019  -     CBC auto differential; Future; Expected date: 05/31/2019  -     US Abdomen Limited; Future; Expected date: 05/31/2019  -     US Elastography Liver; Future; Expected date: 05/31/2019  -     Hepatic function panel; Future; Expected date: 05/31/2019      RTC in 6 months

## 2019-06-07 ENCOUNTER — PROCEDURE VISIT (OUTPATIENT)
Dept: GASTROENTEROLOGY | Facility: CLINIC | Age: 49
End: 2019-06-07
Attending: INTERNAL MEDICINE
Payer: COMMERCIAL

## 2019-06-07 ENCOUNTER — HOSPITAL ENCOUNTER (OUTPATIENT)
Dept: RADIOLOGY | Facility: HOSPITAL | Age: 49
Discharge: HOME | End: 2019-06-07
Attending: INTERNAL MEDICINE
Payer: COMMERCIAL

## 2019-06-07 VITALS
DIASTOLIC BLOOD PRESSURE: 74 MMHG | BODY MASS INDEX: 30.36 KG/M2 | HEART RATE: 60 BPM | WEIGHT: 229.06 LBS | SYSTOLIC BLOOD PRESSURE: 130 MMHG | HEIGHT: 73 IN

## 2019-06-07 DIAGNOSIS — K76.0 FATTY LIVER: ICD-10-CM

## 2019-06-07 DIAGNOSIS — R79.89 ABNORMAL LFTS: ICD-10-CM

## 2019-06-07 PROCEDURE — 91200 LIVER ELASTOGRAPHY: CPT | Mod: S$GLB,,, | Performed by: INTERNAL MEDICINE

## 2019-06-07 PROCEDURE — 91200 PR LIVER ELASTOGRAPHY W/OUT IMAG W/INTERP & REPORT: ICD-10-PCS | Mod: S$GLB,,, | Performed by: INTERNAL MEDICINE

## 2019-06-07 PROCEDURE — 76705 ECHO EXAM OF ABDOMEN: CPT | Mod: 26,,, | Performed by: RADIOLOGY

## 2019-06-07 PROCEDURE — 76705 US ABDOMEN LIMITED: ICD-10-PCS | Mod: 26,,, | Performed by: RADIOLOGY

## 2019-06-07 PROCEDURE — 76705 ECHO EXAM OF ABDOMEN: CPT | Mod: TC

## 2019-06-16 NOTE — PROCEDURES
Fibroscan Procedure     Name: Sae Santiago  Date of Procedure : 2019   :: Erendira Wilcox MD  Diagnosis: NAFLD    Probe: M    Fibroscan readin.0 KPa    Fibrosis:F3     CAP readin dB/m    Steatosis: :<S1       Interpretation:  Stage III liver fibrosis with no steatosis.      Given no evidence of steatosis and concerns about more advanced fibrosis I would recommend patient proceed with liver biopsy for better evaluation of what is going on in his liver.

## 2019-07-03 ENCOUNTER — PATIENT MESSAGE (OUTPATIENT)
Dept: GASTROENTEROLOGY | Facility: CLINIC | Age: 49
End: 2019-07-03

## 2019-07-13 NOTE — TELEPHONE ENCOUNTER
Spoke with patient about fibroscan. Explained labs have normalized so his weight loss has likely helped with decreasing fat in liver and may have resulted in good steatosis score. Recommended to continue work on weight loss and lipid control. Will get labs in 6 months and visit as previously decided.

## 2019-10-01 DIAGNOSIS — I10 ESSENTIAL HYPERTENSION: Primary | ICD-10-CM

## 2019-10-01 RX ORDER — OLMESARTAN MEDOXOMIL 40 MG/1
40 TABLET ORAL DAILY
Qty: 90 TABLET | Refills: 3 | Status: SHIPPED | OUTPATIENT
Start: 2019-10-01 | End: 2019-11-30

## 2019-11-30 ENCOUNTER — OFFICE VISIT (OUTPATIENT)
Dept: URGENT CARE | Facility: CLINIC | Age: 49
End: 2019-11-30
Payer: COMMERCIAL

## 2019-11-30 ENCOUNTER — HOSPITAL ENCOUNTER (EMERGENCY)
Facility: HOSPITAL | Age: 49
Discharge: HOME | End: 2019-12-01
Attending: EMERGENCY MEDICINE
Payer: COMMERCIAL

## 2019-11-30 VITALS
OXYGEN SATURATION: 99 % | HEIGHT: 73 IN | BODY MASS INDEX: 30.09 KG/M2 | DIASTOLIC BLOOD PRESSURE: 78 MMHG | TEMPERATURE: 99 F | HEART RATE: 64 BPM | WEIGHT: 227.06 LBS | SYSTOLIC BLOOD PRESSURE: 138 MMHG | RESPIRATION RATE: 18 BRPM

## 2019-11-30 VITALS
RESPIRATION RATE: 18 BRPM | HEIGHT: 73 IN | OXYGEN SATURATION: 100 % | HEART RATE: 81 BPM | WEIGHT: 229 LBS | DIASTOLIC BLOOD PRESSURE: 68 MMHG | BODY MASS INDEX: 30.35 KG/M2 | SYSTOLIC BLOOD PRESSURE: 155 MMHG

## 2019-11-30 DIAGNOSIS — M54.12 CERVICAL RADICULOPATHY: Primary | ICD-10-CM

## 2019-11-30 DIAGNOSIS — M54.2 ACUTE NECK PAIN: ICD-10-CM

## 2019-11-30 DIAGNOSIS — M79.603 ARM PAIN: ICD-10-CM

## 2019-11-30 DIAGNOSIS — M25.519 SHOULDER PAIN: ICD-10-CM

## 2019-11-30 DIAGNOSIS — S16.1XXA STRAIN OF NECK MUSCLE, INITIAL ENCOUNTER: Primary | ICD-10-CM

## 2019-11-30 LAB
ANION GAP SERPL CALC-SCNC: 12 MMOL/L (ref 8–16)
BASOPHILS # BLD AUTO: 0.03 K/UL (ref 0–0.2)
BASOPHILS NFR BLD: 0.4 % (ref 0–1.9)
BUN SERPL-MCNC: 34 MG/DL (ref 6–20)
CALCIUM SERPL-MCNC: 9.7 MG/DL (ref 8.7–10.5)
CHLORIDE SERPL-SCNC: 103 MMOL/L (ref 95–110)
CO2 SERPL-SCNC: 26 MMOL/L (ref 23–29)
CREAT SERPL-MCNC: 1.3 MG/DL (ref 0.5–1.4)
DIFFERENTIAL METHOD: ABNORMAL
EOSINOPHIL # BLD AUTO: 0.1 K/UL (ref 0–0.5)
EOSINOPHIL NFR BLD: 0.9 % (ref 0–8)
ERYTHROCYTE [DISTWIDTH] IN BLOOD BY AUTOMATED COUNT: 12.7 % (ref 11.5–14.5)
EST. GFR  (AFRICAN AMERICAN): >60 ML/MIN/1.73 M^2
EST. GFR  (NON AFRICAN AMERICAN): >60 ML/MIN/1.73 M^2
GLUCOSE SERPL-MCNC: 98 MG/DL (ref 70–110)
HCT VFR BLD AUTO: 46.4 % (ref 40–54)
HGB BLD-MCNC: 14.7 G/DL (ref 14–18)
HIV 1+2 AB+HIV1 P24 AG SERPL QL IA: NEGATIVE
IMM GRANULOCYTES # BLD AUTO: 0.03 K/UL (ref 0–0.04)
IMM GRANULOCYTES NFR BLD AUTO: 0.4 % (ref 0–0.5)
LYMPHOCYTES # BLD AUTO: 1.6 K/UL (ref 1–4.8)
LYMPHOCYTES NFR BLD: 20.4 % (ref 18–48)
MCH RBC QN AUTO: 27.5 PG (ref 27–31)
MCHC RBC AUTO-ENTMCNC: 31.7 G/DL (ref 32–36)
MCV RBC AUTO: 87 FL (ref 82–98)
MONOCYTES # BLD AUTO: 0.5 K/UL (ref 0.3–1)
MONOCYTES NFR BLD: 6 % (ref 4–15)
NEUTROPHILS # BLD AUTO: 5.7 K/UL (ref 1.8–7.7)
NEUTROPHILS NFR BLD: 71.9 % (ref 38–73)
NRBC BLD-RTO: 0 /100 WBC
PLATELET # BLD AUTO: 228 K/UL (ref 150–350)
PMV BLD AUTO: 10 FL (ref 9.2–12.9)
POTASSIUM SERPL-SCNC: 4.2 MMOL/L (ref 3.5–5.1)
RBC # BLD AUTO: 5.34 M/UL (ref 4.6–6.2)
SODIUM SERPL-SCNC: 141 MMOL/L (ref 136–145)
TROPONIN I SERPL DL<=0.01 NG/ML-MCNC: <0.006 NG/ML (ref 0–0.03)
WBC # BLD AUTO: 7.96 K/UL (ref 3.9–12.7)

## 2019-11-30 PROCEDURE — 96374 THER/PROPH/DIAG INJ IV PUSH: CPT

## 2019-11-30 PROCEDURE — 85025 COMPLETE CBC W/AUTO DIFF WBC: CPT

## 2019-11-30 PROCEDURE — 96372 THER/PROPH/DIAG INJ SC/IM: CPT | Mod: S$GLB,,, | Performed by: PHYSICIAN ASSISTANT

## 2019-11-30 PROCEDURE — 99285 EMERGENCY DEPT VISIT HI MDM: CPT | Mod: 25

## 2019-11-30 PROCEDURE — 96372 PR INJECTION,THERAP/PROPH/DIAG2ST, IM OR SUBCUT: ICD-10-PCS | Mod: S$GLB,,, | Performed by: PHYSICIAN ASSISTANT

## 2019-11-30 PROCEDURE — 99214 PR OFFICE/OUTPT VISIT, EST, LEVL IV, 30-39 MIN: ICD-10-PCS | Mod: 25,S$GLB,, | Performed by: PHYSICIAN ASSISTANT

## 2019-11-30 PROCEDURE — 63600175 PHARM REV CODE 636 W HCPCS: Performed by: EMERGENCY MEDICINE

## 2019-11-30 PROCEDURE — 80048 BASIC METABOLIC PNL TOTAL CA: CPT

## 2019-11-30 PROCEDURE — 93010 EKG 12-LEAD: ICD-10-PCS | Mod: ,,, | Performed by: INTERNAL MEDICINE

## 2019-11-30 PROCEDURE — 93005 ELECTROCARDIOGRAM TRACING: CPT

## 2019-11-30 PROCEDURE — 99214 OFFICE O/P EST MOD 30 MIN: CPT | Mod: 25,S$GLB,, | Performed by: PHYSICIAN ASSISTANT

## 2019-11-30 PROCEDURE — 84484 ASSAY OF TROPONIN QUANT: CPT

## 2019-11-30 PROCEDURE — 25000003 PHARM REV CODE 250: Performed by: EMERGENCY MEDICINE

## 2019-11-30 PROCEDURE — 86703 HIV-1/HIV-2 1 RESULT ANTBDY: CPT

## 2019-11-30 PROCEDURE — 93010 ELECTROCARDIOGRAM REPORT: CPT | Mod: ,,, | Performed by: INTERNAL MEDICINE

## 2019-11-30 RX ORDER — LIDOCAINE 50 MG/G
1 PATCH TOPICAL
Status: DISCONTINUED | OUTPATIENT
Start: 2019-11-30 | End: 2019-12-01 | Stop reason: HOSPADM

## 2019-11-30 RX ORDER — ACETAMINOPHEN 500 MG
1000 TABLET ORAL
Status: COMPLETED | OUTPATIENT
Start: 2019-11-30 | End: 2019-11-30

## 2019-11-30 RX ORDER — METHYLPREDNISOLONE 4 MG/1
TABLET ORAL
Qty: 1 PACKAGE | Refills: 0 | Status: ON HOLD | OUTPATIENT
Start: 2019-11-30 | End: 2020-01-10 | Stop reason: HOSPADM

## 2019-11-30 RX ORDER — CYCLOBENZAPRINE HCL 10 MG
10 TABLET ORAL NIGHTLY PRN
Qty: 20 TABLET | Refills: 0 | Status: SHIPPED | OUTPATIENT
Start: 2019-11-30 | End: 2020-01-03 | Stop reason: SDUPTHER

## 2019-11-30 RX ORDER — KETOROLAC TROMETHAMINE 30 MG/ML
15 INJECTION, SOLUTION INTRAMUSCULAR; INTRAVENOUS
Status: COMPLETED | OUTPATIENT
Start: 2019-11-30 | End: 2019-11-30

## 2019-11-30 RX ORDER — KETOROLAC TROMETHAMINE 30 MG/ML
30 INJECTION, SOLUTION INTRAMUSCULAR; INTRAVENOUS
Status: COMPLETED | OUTPATIENT
Start: 2019-11-30 | End: 2019-11-30

## 2019-11-30 RX ORDER — GABAPENTIN 300 MG/1
300 CAPSULE ORAL 3 TIMES DAILY
Qty: 90 CAPSULE | Refills: 0 | Status: SHIPPED | OUTPATIENT
Start: 2019-11-30 | End: 2019-12-24

## 2019-11-30 RX ADMIN — KETOROLAC TROMETHAMINE 15 MG: 30 INJECTION, SOLUTION INTRAMUSCULAR; INTRAVENOUS at 10:11

## 2019-11-30 RX ADMIN — LIDOCAINE 1 PATCH: 50 PATCH TOPICAL at 10:11

## 2019-11-30 RX ADMIN — KETOROLAC TROMETHAMINE 30 MG: 30 INJECTION, SOLUTION INTRAMUSCULAR; INTRAVENOUS at 10:11

## 2019-11-30 RX ADMIN — ACETAMINOPHEN 1000 MG: 500 TABLET ORAL at 10:11
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
## 2019-11-30 NOTE — PATIENT INSTRUCTIONS
Toradol shot today - no NSAID for next 24 hours   Medrol dose pack - take steroid in am   Rest   Ice   Follow-up with physiatry if no improvement in symptoms in next 10-14 days   Go to ED if any numbness, weakness, or any other worrisome symptoms

## 2019-12-01 NOTE — ED NOTES
Pt reports L shoulder pain that began appx 2 weeks ago. Went to urgent care this morning and was given shot of Toradol, reports pain subsided. States the pain returned but is radiating down left arm. Asking to have cardiac enzymes drawn. Denies SOB and CP.
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
## 2019-12-02 ENCOUNTER — TELEPHONE (OUTPATIENT)
Dept: PAIN MEDICINE | Facility: CLINIC | Age: 49
End: 2019-12-02

## 2019-12-02 NOTE — TELEPHONE ENCOUNTER
----- Message from Lopez Dickens sent at 12/2/2019  3:49 PM CST -----  Contact: Pt's wife-Mayi  Type:  Sooner Apoointment Request    Caller is requesting a sooner appointment.  Caller declined first available appointment listed below.  Caller will not accept being placed on the waitlist and is requesting a message be sent to doctor.  Name of Caller: Pt'as wife-Camille  When is the first available appointment? 12/05/19  Symptoms: cervical pain/neck pain   Would the patient rather a call back or a response via [facility]? Call back   Best Call Back Number: [phone]  Additional Information: n/a

## 2019-12-02 NOTE — TELEPHONE ENCOUNTER
Contacted pt. Pt requesting work in appt. Offered pt appt tomorrow. Pt gladly accepted. All questions answered.//lp

## 2019-12-03 ENCOUNTER — OFFICE VISIT (OUTPATIENT)
Dept: PAIN MEDICINE | Facility: CLINIC | Age: 49
End: 2019-12-03
Payer: COMMERCIAL

## 2019-12-03 ENCOUNTER — PATIENT MESSAGE (OUTPATIENT)
Dept: PAIN MEDICINE | Facility: CLINIC | Age: 49
End: 2019-12-03

## 2019-12-03 ENCOUNTER — TELEPHONE (OUTPATIENT)
Dept: URGENT CARE | Facility: CLINIC | Age: 49
End: 2019-12-03

## 2019-12-03 VITALS
HEART RATE: 85 BPM | HEIGHT: 73 IN | SYSTOLIC BLOOD PRESSURE: 149 MMHG | BODY MASS INDEX: 29.86 KG/M2 | DIASTOLIC BLOOD PRESSURE: 91 MMHG | WEIGHT: 225.31 LBS

## 2019-12-03 DIAGNOSIS — M50.30 DDD (DEGENERATIVE DISC DISEASE), CERVICAL: ICD-10-CM

## 2019-12-03 DIAGNOSIS — M47.22 CERVICAL SPONDYLOSIS WITH RADICULOPATHY: Primary | ICD-10-CM

## 2019-12-03 DIAGNOSIS — M79.12 MYALGIA OF AUXILIARY MUSCLES, HEAD AND NECK: ICD-10-CM

## 2019-12-03 PROCEDURE — 99999 PR PBB SHADOW E&M-EST. PATIENT-LVL III: CPT | Mod: PBBFAC,,, | Performed by: PHYSICAL MEDICINE & REHABILITATION

## 2019-12-03 PROCEDURE — 99999 PR PBB SHADOW E&M-EST. PATIENT-LVL III: ICD-10-PCS | Mod: PBBFAC,,, | Performed by: PHYSICAL MEDICINE & REHABILITATION

## 2019-12-03 PROCEDURE — 99204 OFFICE O/P NEW MOD 45 MIN: CPT | Mod: S$GLB,,, | Performed by: PHYSICAL MEDICINE & REHABILITATION

## 2019-12-03 PROCEDURE — 99204 PR OFFICE/OUTPT VISIT, NEW, LEVL IV, 45-59 MIN: ICD-10-PCS | Mod: S$GLB,,, | Performed by: PHYSICAL MEDICINE & REHABILITATION

## 2019-12-03 RX ORDER — AMITRIPTYLINE HYDROCHLORIDE 25 MG/1
25 TABLET, FILM COATED ORAL NIGHTLY PRN
Qty: 30 TABLET | Refills: 2 | Status: ON HOLD | OUTPATIENT
Start: 2019-12-03 | End: 2020-01-10 | Stop reason: HOSPADM

## 2019-12-03 NOTE — LETTER
December 3, 2019      [facility]  [address]  [place] [address]  Phone: [phone]  Fax: [phone]       Patient: Sae Santiago   YOB: 1970  Date of Visit: 12/03/2019    To Whom It May Concern:    Robin Santiago  was at [facility] on 12/03/2019. He may return to work/school on 12/9/19 restricted to light duty. If you have any questions or concerns, or if I can be of further assistance, please do not hesitate to contact me.    Sincerely,    Teo Morton MD

## 2019-12-03 NOTE — PATIENT INSTRUCTIONS
- Schedule for a MRI of the cervical spine to further evaluate source of pain likely cervical radiculopathy secondary to nerve root impingement at the C6-7 level.  -start Elavil (Amitriptyline) 25 mg nightly as needed for pain and sleep  - Continue other current medications as prescribed.  Advised to take Tylenol 1000 mg paired with ibuprofen 400-600 mg simultaneously as this may provide better pain relief.  - Continue home based exercises and discussed the importance of a healthy and active lifestyle  -we will call you after the MRI to go over the results and future plan of care.    Please do not hesitate to contact the clinic if you have any questions regarding your treatment plan.     Teo Morton MD  Interventional Pain Medicine  [facility]
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
## 2019-12-04 ENCOUNTER — PATIENT MESSAGE (OUTPATIENT)
Dept: PAIN MEDICINE | Facility: HOSPITAL | Age: 49
End: 2019-12-04

## 2019-12-04 ENCOUNTER — HOSPITAL ENCOUNTER (OUTPATIENT)
Dept: RADIOLOGY | Facility: HOSPITAL | Age: 49
Discharge: HOME | End: 2019-12-04
Attending: PHYSICAL MEDICINE & REHABILITATION
Payer: COMMERCIAL

## 2019-12-04 ENCOUNTER — PATIENT MESSAGE (OUTPATIENT)
Dept: INTERNAL MEDICINE | Facility: CLINIC | Age: 49
End: 2019-12-04

## 2019-12-04 DIAGNOSIS — M47.22 CERVICAL SPONDYLOSIS WITH RADICULOPATHY: Primary | ICD-10-CM

## 2019-12-04 DIAGNOSIS — M50.30 DDD (DEGENERATIVE DISC DISEASE), CERVICAL: ICD-10-CM

## 2019-12-04 DIAGNOSIS — M79.12 MYALGIA OF AUXILIARY MUSCLES, HEAD AND NECK: ICD-10-CM

## 2019-12-04 DIAGNOSIS — M47.22 CERVICAL SPONDYLOSIS WITH RADICULOPATHY: ICD-10-CM

## 2019-12-04 PROCEDURE — 72141 MRI NECK SPINE W/O DYE: CPT | Mod: TC

## 2019-12-04 NOTE — PROGRESS NOTES
Contacted the patient today to review results of cervical spine MRI which demonstrated a C6-7 disc herniation with severe central spinal stenosis resulting in impingement on the cord but no abnormal signal.  At C7-T1, there is no evidence of central canal stenosis or foraminal narrowing.  Therefore, I discussed the option of trialing a C7-T1 interlaminar epidural steroid injection to provide relief of his pain. He expressed understanding of the MRI findings and elected to pursue cervical epidural steroid injection. He would have to hold his fish oil and any NSAIDs for 7 days prior to injection to minimize the risk of bleeding.    Teo Morton MD  Interventional Pain Medicine  [facility] [place]

## 2019-12-06 NOTE — PRE ADMISSION SCREENING
Spoke with  Sae  regarding procedure scheduled on 12/13  Arrival time 0810  Has patient been sick with fever or on antibiotics within the last 7 days? no  Has patient received a vaccination within the last 7 days? no  Has the patient stopped all medications as directed? yes  Does patient have a pacemaker and or defibrillator? no  Does the patient have a ride to and from procedure and someone reliable to remain with patient? yes  Is the patient diabetic? no  Does the patient have sleep apnea? no Or use O2 at home? no  Is the patient receiving sedation? Yes  Is the patient instructed to remain NPO beginning at midnight the night before their procedure? yes  Procedure location confirmed with patient? yes

## 2019-12-11 ENCOUNTER — OFFICE VISIT (OUTPATIENT)
Dept: NEUROSURGERY | Facility: CLINIC | Age: 49
End: 2019-12-11
Payer: COMMERCIAL

## 2019-12-11 ENCOUNTER — PATIENT MESSAGE (OUTPATIENT)
Dept: NEUROSURGERY | Facility: CLINIC | Age: 49
End: 2019-12-11

## 2019-12-11 ENCOUNTER — TELEPHONE (OUTPATIENT)
Dept: NEUROSURGERY | Facility: CLINIC | Age: 49
End: 2019-12-11

## 2019-12-11 VITALS
WEIGHT: 219.13 LBS | SYSTOLIC BLOOD PRESSURE: 150 MMHG | HEIGHT: 73 IN | BODY MASS INDEX: 29.04 KG/M2 | RESPIRATION RATE: 18 BRPM | HEART RATE: 98 BPM | DIASTOLIC BLOOD PRESSURE: 84 MMHG

## 2019-12-11 DIAGNOSIS — R93.7 ABNORMAL X-RAY OF CERVICAL SPINE: Primary | ICD-10-CM

## 2019-12-11 DIAGNOSIS — M54.12 CERVICAL RADICULOPATHY: ICD-10-CM

## 2019-12-11 DIAGNOSIS — M50.30 DEGENERATIVE DISC DISEASE, CERVICAL: Primary | ICD-10-CM

## 2019-12-11 PROCEDURE — 3079F DIAST BP 80-89 MM HG: CPT | Mod: CPTII,S$GLB,, | Performed by: NEUROLOGICAL SURGERY

## 2019-12-11 PROCEDURE — 3079F PR MOST RECENT DIASTOLIC BLOOD PRESSURE 80-89 MM HG: ICD-10-PCS | Mod: CPTII,S$GLB,, | Performed by: NEUROLOGICAL SURGERY

## 2019-12-11 PROCEDURE — 99204 OFFICE O/P NEW MOD 45 MIN: CPT | Mod: S$GLB,,, | Performed by: NEUROLOGICAL SURGERY

## 2019-12-11 PROCEDURE — 99999 PR PBB SHADOW E&M-EST. PATIENT-LVL III: CPT | Mod: PBBFAC,,, | Performed by: NEUROLOGICAL SURGERY

## 2019-12-11 PROCEDURE — 3008F BODY MASS INDEX DOCD: CPT | Mod: CPTII,S$GLB,, | Performed by: NEUROLOGICAL SURGERY

## 2019-12-11 PROCEDURE — 3077F SYST BP >= 140 MM HG: CPT | Mod: CPTII,S$GLB,, | Performed by: NEUROLOGICAL SURGERY

## 2019-12-11 PROCEDURE — 99999 PR PBB SHADOW E&M-EST. PATIENT-LVL III: ICD-10-PCS | Mod: PBBFAC,,, | Performed by: NEUROLOGICAL SURGERY

## 2019-12-11 PROCEDURE — 3077F PR MOST RECENT SYSTOLIC BLOOD PRESSURE >= 140 MM HG: ICD-10-PCS | Mod: CPTII,S$GLB,, | Performed by: NEUROLOGICAL SURGERY

## 2019-12-11 PROCEDURE — 3008F PR BODY MASS INDEX (BMI) DOCUMENTED: ICD-10-PCS | Mod: CPTII,S$GLB,, | Performed by: NEUROLOGICAL SURGERY

## 2019-12-11 PROCEDURE — 99204 PR OFFICE/OUTPT VISIT, NEW, LEVL IV, 45-59 MIN: ICD-10-PCS | Mod: S$GLB,,, | Performed by: NEUROLOGICAL SURGERY

## 2019-12-11 RX ORDER — TRAMADOL HYDROCHLORIDE 50 MG/1
50 TABLET ORAL EVERY 6 HOURS PRN
Qty: 60 TABLET | Refills: 0 | Status: ON HOLD | OUTPATIENT
Start: 2019-12-11 | End: 2020-01-10 | Stop reason: HOSPADM

## 2019-12-11 NOTE — TELEPHONE ENCOUNTER
Restrictions noted on update letter patient aware via patient portal of update.     ----- Message from Coni Trevino sent at 12/11/2019  2:12 PM CST -----  Contact: [phone]/self  Patient did not get the rx for his tramadol. He also needs something written that he can only lift up to 10 lbs . Please call him to discuss. You can send it through the portal also. Thanks

## 2019-12-12 ENCOUNTER — TELEPHONE (OUTPATIENT)
Dept: NEUROSURGERY | Facility: CLINIC | Age: 49
End: 2019-12-12

## 2019-12-12 ENCOUNTER — PATIENT MESSAGE (OUTPATIENT)
Dept: NEUROSURGERY | Facility: CLINIC | Age: 49
End: 2019-12-12

## 2019-12-12 ENCOUNTER — PATIENT MESSAGE (OUTPATIENT)
Dept: INTERNAL MEDICINE | Facility: CLINIC | Age: 49
End: 2019-12-12

## 2019-12-12 ENCOUNTER — PATIENT MESSAGE (OUTPATIENT)
Dept: PAIN MEDICINE | Facility: HOSPITAL | Age: 49
End: 2019-12-12

## 2019-12-12 NOTE — TELEPHONE ENCOUNTER
Spoke with pt in reference to the message below, informed pt when scheduling Ct it may have to be pushed out a little further pt wanted try for closer time since he was off, pt was fine with changes//ns     ----- Message from Lizzy Rao RT sent at 12/12/2019 10:12 AM CST -----  Contact: Radiology  Good Morning, patient has a CT Scan scheduled for tomorrow, and we need to allow time for insurance approval, is the test medically urgent? if not medically urgent we may need to re-schedule to allow Insurance time to approve test. If you have any questions call ext [phone].  Thanks  [facility]    IF TEST IS NOT MEDICALLY URGENT, PLEASE RE-SCHEDULE PATIENT AT LEAST 3-4 DAYS OUT TO ALLOW TIME FOR INSURANCE TO PROCESS APPROVAL

## 2019-12-13 ENCOUNTER — HOSPITAL ENCOUNTER (OUTPATIENT)
Facility: HOSPITAL | Age: 49
Discharge: HOME | End: 2019-12-13
Attending: PAIN MEDICINE | Admitting: PAIN MEDICINE
Payer: COMMERCIAL

## 2019-12-13 VITALS
BODY MASS INDEX: 28.93 KG/M2 | OXYGEN SATURATION: 97 % | WEIGHT: 218.25 LBS | HEIGHT: 73 IN | TEMPERATURE: 99 F | DIASTOLIC BLOOD PRESSURE: 74 MMHG | RESPIRATION RATE: 20 BRPM | SYSTOLIC BLOOD PRESSURE: 137 MMHG | HEART RATE: 71 BPM

## 2019-12-13 DIAGNOSIS — M54.12 CERVICAL RADICULOPATHY: Primary | ICD-10-CM

## 2019-12-13 PROCEDURE — 99152 MOD SED SAME PHYS/QHP 5/>YRS: CPT | Mod: ,,, | Performed by: PAIN MEDICINE

## 2019-12-13 PROCEDURE — 62321 PR INJ CERV/THORAC, W/GUIDANCE: ICD-10-PCS | Mod: ,,, | Performed by: PAIN MEDICINE

## 2019-12-13 PROCEDURE — 62320 NJX INTERLAMINAR CRV/THRC: CPT | Performed by: PAIN MEDICINE

## 2019-12-13 PROCEDURE — 63600175 PHARM REV CODE 636 W HCPCS: Performed by: PAIN MEDICINE

## 2019-12-13 PROCEDURE — 27000221 HC OXYGEN, UP TO 24 HOURS

## 2019-12-13 PROCEDURE — 99152 PR MOD CONSCIOUS SEDATION, SAME PHYS, 5+ YRS, FIRST 15 MIN: ICD-10-PCS | Mod: ,,, | Performed by: PAIN MEDICINE

## 2019-12-13 PROCEDURE — 62321 NJX INTERLAMINAR CRV/THRC: CPT | Performed by: PAIN MEDICINE

## 2019-12-13 PROCEDURE — 25500020 PHARM REV CODE 255: Performed by: PAIN MEDICINE

## 2019-12-13 PROCEDURE — 62321 NJX INTERLAMINAR CRV/THRC: CPT | Mod: ,,, | Performed by: PAIN MEDICINE

## 2019-12-13 RX ORDER — LIDOCAINE HYDROCHLORIDE 10 MG/ML
INJECTION INFILTRATION; PERINEURAL
Status: DISCONTINUED | OUTPATIENT
Start: 2019-12-13 | End: 2019-12-13 | Stop reason: HOSPADM

## 2019-12-13 RX ORDER — DEXAMETHASONE SODIUM PHOSPHATE 10 MG/ML
INJECTION INTRAMUSCULAR; INTRAVENOUS
Status: DISCONTINUED | OUTPATIENT
Start: 2019-12-13 | End: 2019-12-13 | Stop reason: HOSPADM

## 2019-12-13 RX ORDER — FENTANYL CITRATE 50 UG/ML
INJECTION, SOLUTION INTRAMUSCULAR; INTRAVENOUS
Status: DISCONTINUED | OUTPATIENT
Start: 2019-12-13 | End: 2019-12-13 | Stop reason: HOSPADM

## 2019-12-13 RX ORDER — MIDAZOLAM HYDROCHLORIDE 1 MG/ML
INJECTION, SOLUTION INTRAMUSCULAR; INTRAVENOUS
Status: DISCONTINUED | OUTPATIENT
Start: 2019-12-13 | End: 2019-12-13 | Stop reason: HOSPADM

## 2019-12-13 NOTE — DISCHARGE INSTRUCTIONS

## 2019-12-13 NOTE — OP NOTE
PROCEDURE: Cervical epidural steroid injection under fluoroscopic guidance    LEVEL: C7/T1   SIDE: Midline     PROCEDURE DATE: 12/13/2019    PREOPERATIVE DIAGNOSIS: Cervical radiculopathy  POSTOPERATIVE DIAGNOSIS: Cervical radiculopathy    PROVIDER: LOLA Goldstein MD  Assistant(s): None    ANESTHESIA: Local, IV Sedation    >> 2 mg of VERSED    >> 50 mcg of FENTANYL     INDICATION: The patient has neck pain and radiculopathy symptoms unresponsive to conservative treatments. Fluoroscopy was used to optimize visualization of needle placement and to maximize safety.        PROCEDURE DESCRIPTION / TECHNIQUE:   The patient was seen and identified in the preoperative area. Risks, benefits, complications, and alternatives were discussed with the patient. The patient agreed to proceed with the procedure and signed the consent. The site and side of the procedure was identified and marked. An IV was started.    The patient was taken to the procedural suite. The patient was positioned in prone orientation on procedure table. A time out was performed prior to any intervention. The procedure, site, side, and allergies were stated and agreed to by all present. The posterior cervical area was widely prepped with ChloraPrep. The procedural site was draped in usual sterile fashion. Vital signs were closely monitored throughout this procedure. Conscious sedation was used for this procedure to decrease patient anxiety.    Using anterior-posterior fluoroscopy, the above noted INTERLAMINAR SPACE was identified and the overlying subcutaneous tissues were infiltrated with 3-4 mL of PF 1% LIDOCAINE using a 1.5 inch 27 gauge needle. A 20-gauge Tuohy epidural needle was then introduced through the same puncture site and advanced toward the epidural space incrementally under fluoroscopic guidance. A Loss of Resistance technique was used as the epidural needle was advanced. Contralateral oblique imaging was used to help gauge needle depth as  the Tuohy was advanced. Once loss of resistance was realized and after negative aspiration of blood and spinal fluid, correct needle placement within the epidural space was verified with the injection of 0.5 to 1 mL of water soluble contrast dye (Omnipaque 240). Appropriate epidural contrast spread was seen on imaging. Again, after negative aspiration of blood and spinal fluid a 3 mL mixture containing 1 mL of Dexamethasone (10 mg/mL) and 1 mL of preservative free Normal Saline and 1ml of 1% preservative free lidocaine was injected. No pain or paresthesias were noted with injection. There was low resistance during the injection. Washout of epidurogram was seen on fluoroscopy following injection. The stylet was replaced and the Tuohy needle was withdrawn intact. The skin was cleansed, and bandages were applied.    Description of Findings: Not applicable    Prosthetic devices, grafts, tissues, or devices implanted: None    Specimen Removed: No    Estimated Blood Loss: minimal    COMPLICATIONS: None    DISPOSITION / PLANS: The patient was transferred to the recovery area in a stable condition for observation. The patient was reexamined prior to discharge. There was no evidence of acute neurologic injury following the procedure.  Patient was discharged from the recovery room after meeting discharge criteria. Home discharge instructions were given to the patient by the staff.
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
## 2019-12-13 NOTE — DISCHARGE SUMMARY
[facility]  Short Stay  Discharge Summary    Admit Date: 12/13/2019    Discharge Date and Time: 12/13/2019  9:10 AM      Discharge Attending Physician: LOLA Goldstein MD     Hospital Course (synopsis of major diagnoses, care, treatment, and services provided during the course of the hospital stay): Patient was admitted to Pre-op where informed consent was signed.  The patient was then taken to the procedure suite where the procedure was performed.  The patient was then return to the Pre-Op area and discharge was performed.     Final Diagnoses:    Principal Problem: <principal problem not specified>   Secondary Diagnoses:   Active Hospital Problems    Diagnosis  POA    Cervical radiculopathy [M54.12]  Yes      Resolved Hospital Problems   No resolved problems to display.       Discharged Condition: good    Disposition: Home or Self Care    Follow up/Patient Instructions:    Medications:  Reconciled Home Medications:      Medication List      CHANGE how you take these medications    LORazepam 0.5 MG tablet  Commonly known as:  ATIVAN  Take 1 tablet (0.5 mg total) by mouth every evening.  What changed:    · when to take this  · reasons to take this        CONTINUE taking these medications    ammonium lactate 12 % lotion  Commonly known as:  AmLactin  Use daily.  Apply to damp skin after bathing.     atorvastatin 40 MG tablet  Commonly known as:  LIPITOR  Take 1 tablet (40 mg total) by mouth once daily.     busPIRone 10 MG tablet  Commonly known as:  BUSPAR  Take 1 tablet (10 mg total) by mouth 3 (three) times daily.     cetirizine 10 MG tablet  Commonly known as:  ZYRTEC  Take 10 mg by mouth once daily.     cyclobenzaprine 10 MG tablet  Commonly known as:  FLEXERIL  Take 1 tablet (10 mg total) by mouth nightly as needed for Muscle spasms.     fish oil-omega-3 fatty acids 300-1,000 mg capsule  Take 1 capsule by mouth once daily.     gabapentin 300 MG capsule  Commonly known as:  NEURONTIN  Take 1  capsule (300 mg total) by mouth 3 (three) times daily.     GLUCOS CHOND CPLX ADVANCED ORAL  Take 1 capsule by mouth every evening.     hydroCHLOROthiazide 12.5 mg capsule  Commonly known as:  MICROZIDE  Take 1 capsule (12.5 mg total) by mouth once daily.     losartan 100 MG tablet  Commonly known as:  COZAAR  Take 1 tablet (100 mg total) by mouth once daily.     metoprolol succinate 25 MG 24 hr tablet  Commonly known as:  TOPROL-XL  Take 1 1/2 tablet a night     multivitamin per tablet  Commonly known as:  THERAGRAN  Take 1 tablet by mouth once daily.     traMADol 50 mg tablet  Commonly known as:  ULTRAM  Take 1 tablet (50 mg total) by mouth every 6 (six) hours as needed for Pain.        ASK your doctor about these medications    amitriptyline 25 MG tablet  Commonly known as:  ELAVIL  Take 1 tablet (25 mg total) by mouth nightly as needed for Insomnia.     methylPREDNISolone 4 mg tablet  Commonly known as:  MEDROL DOSEPACK  use as directed          Discharge Procedure Orders   Diet general     Call MD for:  severe uncontrolled pain     Call MD for:  difficulty breathing, headache or visual disturbances     Call MD for:  redness, tenderness, or signs of infection (pain, swelling, redness, odor or green/yellow discharge around incision site)     Activity as tolerated

## 2019-12-17 ENCOUNTER — OFFICE VISIT (OUTPATIENT)
Dept: INTERNAL MEDICINE | Facility: CLINIC | Age: 49
End: 2019-12-17
Payer: COMMERCIAL

## 2019-12-17 ENCOUNTER — HOSPITAL ENCOUNTER (OUTPATIENT)
Dept: RADIOLOGY | Facility: HOSPITAL | Age: 49
Discharge: HOME | End: 2019-12-17
Attending: NEUROLOGICAL SURGERY
Payer: COMMERCIAL

## 2019-12-17 ENCOUNTER — LAB VISIT (OUTPATIENT)
Dept: LAB | Facility: HOSPITAL | Age: 49
End: 2019-12-17
Attending: FAMILY MEDICINE
Payer: COMMERCIAL

## 2019-12-17 VITALS
OXYGEN SATURATION: 98 % | TEMPERATURE: 98 F | HEART RATE: 72 BPM | WEIGHT: 218.5 LBS | SYSTOLIC BLOOD PRESSURE: 138 MMHG | HEIGHT: 73 IN | BODY MASS INDEX: 28.96 KG/M2 | DIASTOLIC BLOOD PRESSURE: 88 MMHG

## 2019-12-17 DIAGNOSIS — Z00.00 ANNUAL PHYSICAL EXAM: ICD-10-CM

## 2019-12-17 DIAGNOSIS — R93.7 ABNORMAL X-RAY OF CERVICAL SPINE: ICD-10-CM

## 2019-12-17 DIAGNOSIS — Z00.00 ANNUAL PHYSICAL EXAM: Primary | ICD-10-CM

## 2019-12-17 LAB
ALBUMIN SERPL BCP-MCNC: 4 G/DL (ref 3.5–5.2)
ALP SERPL-CCNC: 65 U/L (ref 55–135)
ALT SERPL W/O P-5'-P-CCNC: 17 U/L (ref 10–44)
ANION GAP SERPL CALC-SCNC: 5 MMOL/L (ref 8–16)
AST SERPL-CCNC: 15 U/L (ref 10–40)
BASOPHILS # BLD AUTO: 0.03 K/UL (ref 0–0.2)
BASOPHILS NFR BLD: 0.4 % (ref 0–1.9)
BILIRUB SERPL-MCNC: 0.7 MG/DL (ref 0.1–1)
BUN SERPL-MCNC: 15 MG/DL (ref 6–20)
CALCIUM SERPL-MCNC: 10.2 MG/DL (ref 8.7–10.5)
CHLORIDE SERPL-SCNC: 99 MMOL/L (ref 95–110)
CHOLEST SERPL-MCNC: 137 MG/DL (ref 120–199)
CHOLEST/HDLC SERPL: 3.4 {RATIO} (ref 2–5)
CO2 SERPL-SCNC: 34 MMOL/L (ref 23–29)
CREAT SERPL-MCNC: 1.1 MG/DL (ref 0.5–1.4)
DIFFERENTIAL METHOD: ABNORMAL
EOSINOPHIL # BLD AUTO: 0.1 K/UL (ref 0–0.5)
EOSINOPHIL NFR BLD: 0.9 % (ref 0–8)
ERYTHROCYTE [DISTWIDTH] IN BLOOD BY AUTOMATED COUNT: 12.4 % (ref 11.5–14.5)
EST. GFR  (AFRICAN AMERICAN): >60 ML/MIN/1.73 M^2
EST. GFR  (NON AFRICAN AMERICAN): >60 ML/MIN/1.73 M^2
GLUCOSE SERPL-MCNC: 114 MG/DL (ref 70–110)
HCT VFR BLD AUTO: 49.1 % (ref 40–54)
HDLC SERPL-MCNC: 40 MG/DL (ref 40–75)
HDLC SERPL: 29.2 % (ref 20–50)
HGB BLD-MCNC: 15.5 G/DL (ref 14–18)
IMM GRANULOCYTES # BLD AUTO: 0.02 K/UL (ref 0–0.04)
IMM GRANULOCYTES NFR BLD AUTO: 0.3 % (ref 0–0.5)
LDLC SERPL CALC-MCNC: 77.6 MG/DL (ref 63–159)
LYMPHOCYTES # BLD AUTO: 1.7 K/UL (ref 1–4.8)
LYMPHOCYTES NFR BLD: 24.9 % (ref 18–48)
MCH RBC QN AUTO: 27.8 PG (ref 27–31)
MCHC RBC AUTO-ENTMCNC: 31.6 G/DL (ref 32–36)
MCV RBC AUTO: 88 FL (ref 82–98)
MONOCYTES # BLD AUTO: 0.4 K/UL (ref 0.3–1)
MONOCYTES NFR BLD: 6.3 % (ref 4–15)
NEUTROPHILS # BLD AUTO: 4.7 K/UL (ref 1.8–7.7)
NEUTROPHILS NFR BLD: 67.2 % (ref 38–73)
NONHDLC SERPL-MCNC: 97 MG/DL
NRBC BLD-RTO: 0 /100 WBC
PLATELET # BLD AUTO: 241 K/UL (ref 150–350)
PMV BLD AUTO: 10 FL (ref 9.2–12.9)
POTASSIUM SERPL-SCNC: 4.4 MMOL/L (ref 3.5–5.1)
PROT SERPL-MCNC: 7.2 G/DL (ref 6–8.4)
RBC # BLD AUTO: 5.57 M/UL (ref 4.6–6.2)
SODIUM SERPL-SCNC: 138 MMOL/L (ref 136–145)
TRIGL SERPL-MCNC: 97 MG/DL (ref 30–150)
WBC # BLD AUTO: 6.98 K/UL (ref 3.9–12.7)

## 2019-12-17 PROCEDURE — 80061 LIPID PANEL: CPT

## 2019-12-17 PROCEDURE — 99396 PREV VISIT EST AGE 40-64: CPT | Mod: S$GLB,,, | Performed by: FAMILY MEDICINE

## 2019-12-17 PROCEDURE — 3075F PR MOST RECENT SYSTOLIC BLOOD PRESS GE 130-139MM HG: ICD-10-PCS | Mod: CPTII,S$GLB,, | Performed by: FAMILY MEDICINE

## 2019-12-17 PROCEDURE — 3079F PR MOST RECENT DIASTOLIC BLOOD PRESSURE 80-89 MM HG: ICD-10-PCS | Mod: CPTII,S$GLB,, | Performed by: FAMILY MEDICINE

## 2019-12-17 PROCEDURE — 72125 CT CERVICAL SPINE WITHOUT CONTRAST: ICD-10-PCS | Mod: 26,,, | Performed by: RADIOLOGY

## 2019-12-17 PROCEDURE — 99999 PR PBB SHADOW E&M-EST. PATIENT-LVL III: ICD-10-PCS | Mod: PBBFAC,,, | Performed by: FAMILY MEDICINE

## 2019-12-17 PROCEDURE — 3075F SYST BP GE 130 - 139MM HG: CPT | Mod: CPTII,S$GLB,, | Performed by: FAMILY MEDICINE

## 2019-12-17 PROCEDURE — 3079F DIAST BP 80-89 MM HG: CPT | Mod: CPTII,S$GLB,, | Performed by: FAMILY MEDICINE

## 2019-12-17 PROCEDURE — 72125 CT NECK SPINE W/O DYE: CPT | Mod: TC

## 2019-12-17 PROCEDURE — 99999 PR PBB SHADOW E&M-EST. PATIENT-LVL III: CPT | Mod: PBBFAC,,, | Performed by: FAMILY MEDICINE

## 2019-12-17 PROCEDURE — 85025 COMPLETE CBC W/AUTO DIFF WBC: CPT

## 2019-12-17 PROCEDURE — 36415 COLL VENOUS BLD VENIPUNCTURE: CPT

## 2019-12-17 PROCEDURE — 99396 PR PREVENTIVE VISIT,EST,40-64: ICD-10-PCS | Mod: S$GLB,,, | Performed by: FAMILY MEDICINE

## 2019-12-17 PROCEDURE — 80053 COMPREHEN METABOLIC PANEL: CPT

## 2019-12-17 PROCEDURE — 72125 CT NECK SPINE W/O DYE: CPT | Mod: 26,,, | Performed by: RADIOLOGY

## 2019-12-17 RX ORDER — LORAZEPAM 0.5 MG/1
0.5 TABLET ORAL
Qty: 30 TABLET | Refills: 2 | Status: SHIPPED | OUTPATIENT
Start: 2019-12-17 | End: 2020-07-09 | Stop reason: SDUPTHER

## 2019-12-19 ENCOUNTER — PATIENT MESSAGE (OUTPATIENT)
Dept: NEUROSURGERY | Facility: CLINIC | Age: 49
End: 2019-12-19

## 2019-12-19 ENCOUNTER — TELEPHONE (OUTPATIENT)
Dept: NEUROSURGERY | Facility: CLINIC | Age: 49
End: 2019-12-19

## 2019-12-19 NOTE — TELEPHONE ENCOUNTER
Spoke with pt in reference to the message below pt is aware Dr. Grossman will be back in clinic on 12/24/19, we will pass on his message//ns    ----- Message from Deven Hong sent at 12/19/2019  9:28 AM CST -----  Contact: pt   ..pt checking on update states he has been feeling bad and also checking ct result, pls return call.         [phone]

## 2019-12-20 ENCOUNTER — TELEPHONE (OUTPATIENT)
Dept: INTERNAL MEDICINE | Facility: CLINIC | Age: 49
End: 2019-12-20

## 2019-12-20 ENCOUNTER — PATIENT MESSAGE (OUTPATIENT)
Dept: INTERNAL MEDICINE | Facility: CLINIC | Age: 49
End: 2019-12-20

## 2019-12-20 NOTE — TELEPHONE ENCOUNTER
----- Message from Elisa Byrnes sent at 12/20/2019 11:51 AM CST -----  Contact: pt  States he is having some back pain. States he sent a message through [facility]. States the gabapentin 300 mg is too strong. He would like to get gabapentin 100 mg. Pt uses   [id number] - [place] - [address]  [address]  [place] [address]  Phone: [phone] Fax: [phone]    Please call pt [phone]. Thank you

## 2019-12-20 NOTE — TELEPHONE ENCOUNTER
I returned pt's call, pt's wife answered. I informed her that we did receive pt's message and it was forwarded to Dr. Matthews. //KIRILL

## 2019-12-23 ENCOUNTER — PATIENT MESSAGE (OUTPATIENT)
Dept: NEUROSURGERY | Facility: CLINIC | Age: 49
End: 2019-12-23

## 2019-12-24 ENCOUNTER — PATIENT MESSAGE (OUTPATIENT)
Dept: INTERNAL MEDICINE | Facility: CLINIC | Age: 49
End: 2019-12-24

## 2019-12-24 ENCOUNTER — OFFICE VISIT (OUTPATIENT)
Dept: NEUROSURGERY | Facility: CLINIC | Age: 49
End: 2019-12-24
Payer: COMMERCIAL

## 2019-12-24 ENCOUNTER — HOSPITAL ENCOUNTER (OUTPATIENT)
Dept: RADIOLOGY | Facility: HOSPITAL | Age: 49
Discharge: HOME | End: 2019-12-24
Attending: NEUROLOGICAL SURGERY
Payer: COMMERCIAL

## 2019-12-24 VITALS
BODY MASS INDEX: 29.46 KG/M2 | WEIGHT: 222.25 LBS | RESPIRATION RATE: 18 BRPM | SYSTOLIC BLOOD PRESSURE: 153 MMHG | HEART RATE: 82 BPM | DIASTOLIC BLOOD PRESSURE: 88 MMHG | HEIGHT: 73 IN

## 2019-12-24 DIAGNOSIS — M54.12 CERVICAL RADICULOPATHY: ICD-10-CM

## 2019-12-24 DIAGNOSIS — G99.2 MYELOPATHY CONCURRENT WITH AND DUE TO SPINAL STENOSIS OF CERVICAL REGION: ICD-10-CM

## 2019-12-24 DIAGNOSIS — M50.30 DEGENERATIVE DISC DISEASE, CERVICAL: ICD-10-CM

## 2019-12-24 DIAGNOSIS — M48.02 MYELOPATHY CONCURRENT WITH AND DUE TO SPINAL STENOSIS OF CERVICAL REGION: ICD-10-CM

## 2019-12-24 DIAGNOSIS — M50.30 DEGENERATIVE DISC DISEASE, CERVICAL: Primary | ICD-10-CM

## 2019-12-24 PROCEDURE — 3079F PR MOST RECENT DIASTOLIC BLOOD PRESSURE 80-89 MM HG: ICD-10-PCS | Mod: CPTII,S$GLB,, | Performed by: NEUROLOGICAL SURGERY

## 2019-12-24 PROCEDURE — 3077F SYST BP >= 140 MM HG: CPT | Mod: CPTII,S$GLB,, | Performed by: NEUROLOGICAL SURGERY

## 2019-12-24 PROCEDURE — 99999 PR PBB SHADOW E&M-EST. PATIENT-LVL III: ICD-10-PCS | Mod: PBBFAC,,, | Performed by: NEUROLOGICAL SURGERY

## 2019-12-24 PROCEDURE — 71046 X-RAY EXAM CHEST 2 VIEWS: CPT | Mod: TC

## 2019-12-24 PROCEDURE — 99999 PR PBB SHADOW E&M-EST. PATIENT-LVL III: CPT | Mod: PBBFAC,,, | Performed by: NEUROLOGICAL SURGERY

## 2019-12-24 PROCEDURE — 3008F PR BODY MASS INDEX (BMI) DOCUMENTED: ICD-10-PCS | Mod: CPTII,S$GLB,, | Performed by: NEUROLOGICAL SURGERY

## 2019-12-24 PROCEDURE — 3077F PR MOST RECENT SYSTOLIC BLOOD PRESSURE >= 140 MM HG: ICD-10-PCS | Mod: CPTII,S$GLB,, | Performed by: NEUROLOGICAL SURGERY

## 2019-12-24 PROCEDURE — 99214 OFFICE O/P EST MOD 30 MIN: CPT | Mod: S$GLB,,, | Performed by: NEUROLOGICAL SURGERY

## 2019-12-24 PROCEDURE — 3008F BODY MASS INDEX DOCD: CPT | Mod: CPTII,S$GLB,, | Performed by: NEUROLOGICAL SURGERY

## 2019-12-24 PROCEDURE — 71046 XR CHEST PA AND LATERAL PRE-OP: ICD-10-PCS | Mod: 26,,, | Performed by: RADIOLOGY

## 2019-12-24 PROCEDURE — 71046 X-RAY EXAM CHEST 2 VIEWS: CPT | Mod: 26,,, | Performed by: RADIOLOGY

## 2019-12-24 PROCEDURE — 99214 PR OFFICE/OUTPT VISIT, EST, LEVL IV, 30-39 MIN: ICD-10-PCS | Mod: S$GLB,,, | Performed by: NEUROLOGICAL SURGERY

## 2019-12-24 PROCEDURE — 3079F DIAST BP 80-89 MM HG: CPT | Mod: CPTII,S$GLB,, | Performed by: NEUROLOGICAL SURGERY

## 2019-12-24 RX ORDER — GABAPENTIN 100 MG/1
100 CAPSULE ORAL 3 TIMES DAILY
Qty: 90 CAPSULE | Refills: 0 | Status: ON HOLD | OUTPATIENT
Start: 2019-12-24 | End: 2020-01-10 | Stop reason: HOSPADM

## 2019-12-24 NOTE — H&P (VIEW-ONLY)
Subjective:      Patient ID: Sae Santiago is a 49 y.o. male.    Chief Complaint: Back Pain (Upper back radiates down left arm )    Patient is here today for followup cervical spine pain with a CT scan of the cervical spine for my review    Since the last visit he has had an injection with pain management  The pain today is rated 5/10  It begins in the middle neck and radiates down the left side into his hand  There is associated numbness and tingling  The symptoms are somewhat better than they were previously however they still are very bothersome for him  He is on would work secondary to not being able to tolerate lifting    Review of Systems   Constitutional: Negative for activity change, appetite change and chills.   HENT: Negative for congestion and ear pain.    Eyes: Negative for photophobia, redness and visual disturbance.   Respiratory: Negative for apnea and chest tightness.    Cardiovascular: Negative for chest pain.   Gastrointestinal: Negative for abdominal distention and abdominal pain.   Endocrine: Negative for cold intolerance.   Genitourinary: Negative for difficulty urinating.   Musculoskeletal: Positive for myalgias, neck pain and neck stiffness. Negative for arthralgias.   Skin: Negative for color change.   Allergic/Immunologic: Negative for environmental allergies.   Neurological: Positive for weakness and numbness. Negative for dizziness.   Hematological: Negative for adenopathy. Does not bruise/bleed easily.   Psychiatric/Behavioral: Negative for agitation, behavioral problems and confusion.         Objective:       Neurosurgery Physical Exam  Ortho/SPM Exam    Nursing note and vitals reviewed  Gen:Oriented to person, place, and time.             Appears stated age   Head:Normocephalic and atraumatic.  Nose: Nose normal.    Eyes: EOM are normal. Pupils are equal, round, and reactive to light.   Neck: Neck supple. No masses or lesions palpated  Cardiovascular: Intact distal pulses.     Abdominal: Soft.   Neurological: Alert and oriented to person, place, and time.  No cranial nerve deficit.  Coordination normal. Normal muscle tone  Psychiatric: Normal mood and affect. Behavior is normal.    Neck:  None  Paraspinal tenderness   None  Paraspinal muscle spasms   None  Pain with flexion and extention   WNL  Range of motion    Neg  Spurling's sign     Motor   Right Right Left Left  Level Group   5  5  C5 Deltoid   5  5  C6 Bicep   5   4+  Wrist extension    5   4+ C7 Triceps   5   4+  Wrist flexion   5  5  C8    5  5  T1 Interossei    Sensation  NL Decreased (R/L/BL) Level Sensation    X  C5 Lateral upper arm    Diminished light touch on the left C6 Thumb and index finger, lat forearm    Diminished light touch on the left C7 Middle finger   X  C8 Ring and little finger   X  T1 Medial arm      Reflex  2+  Bicep tendon   2+  Brachioradialis   2+  Triceps tendon   Not present  Lopez's   none  Clonus   neg  Tinel's     MRI cervical spine  Large acute disc herniation centrally at the C6-C7 level with extension into the neural foramina bilaterally greater on the left side.  Absence of CSF spaces around the cord and evidence of flattening of the cord.  Significant impingement on the cord without myelopathic changes.    CT scan cervical spine    C6-7: Posterior disc osteophyte complex with central prominence, slightly greater on the right.  Eccentric effacement of the anterior subarachnoid space and cord.  Central stenosis with AP diameter in the right paracentral location of 4.5 mm.  Prominent uncovertebral osteophyte formation with mild foraminal stenosis, slightly greater on the right.  Assessment:     1. Degenerative disc disease, cervical    2. Cervical radiculopathy    3. Myelopathy concurrent with and due to spinal stenosis of cervical region      Plan:     Degenerative disc disease, cervical  -     X-Ray Chest PA And Lateral Pre-OP; Future; Expected date: 12/24/2019  -     Protime-INR; Future;  Expected date: 12/24/2019    Cervical radiculopathy  -     X-Ray Chest PA And Lateral Pre-OP; Future; Expected date: 12/24/2019  -     Protime-INR; Future; Expected date: 12/24/2019    Myelopathy concurrent with and due to spinal stenosis of cervical region  -     X-Ray Chest PA And Lateral Pre-OP; Future; Expected date: 12/24/2019  -     Protime-INR; Future; Expected date: 12/24/2019     Patient remains symptomatic from herniated disc  There is a posterior osteophyte associated with the C6-7 level  The disc herniated upwards in travels behind the vertebral body of C6  I have explained that my recommendations are for an ACDF at C6-7 versus C6 corpectomy depending on whether not the disc fragment can be removed with a simple diskectomy    I have explained all the risks today  The patient was informed of all benefits and potential risk of the operation including but not limited to:  Pain, infection, bleeding, coma, paralysis, death.  Cerebrospinal fluid leak, failure of any instrumentation, the need for additional procedures in the future. No guarantee was given that this procedure would alleviate all of the symptoms.    Consents were signed in the office  We will contact him in the next several weeks to schedule surgical date    Thank you for the referral   Please call with any questions    Ari Grossman MD  Neurosurgery

## 2019-12-24 NOTE — PROGRESS NOTES
Subjective:      Patient ID: Sae Santiago is a 49 y.o. male.    Chief Complaint: Lumbar Spine Pain (L-Spine) (Upper back )    Patient presents as a new patient for evaluation of neck pain as well as left sided radiculopathy    Symptoms have been present for the past several months getting worse in severity  Pain begins in the neck and radiates down the left upper extremity into the hand  Worse with activity and better with rest  Rates to pain anywhere between 5 to 8/10 in severity  Has tried therapy and is currently scheduled to have an injection in the next several weeks with pain management  Positive numbness and tingling  Denies any weakness    Review of Systems   Constitutional: Negative for activity change, appetite change and chills.   HENT: Negative for congestion and ear pain.    Eyes: Negative for photophobia, redness and visual disturbance.   Respiratory: Negative for apnea and chest tightness.    Cardiovascular: Negative for chest pain.   Gastrointestinal: Negative for abdominal distention and abdominal pain.   Endocrine: Negative for cold intolerance.   Genitourinary: Negative for difficulty urinating.   Musculoskeletal: Positive for myalgias, neck pain and neck stiffness. Negative for arthralgias.   Skin: Negative for color change.   Allergic/Immunologic: Negative for environmental allergies.   Neurological: Positive for weakness and numbness. Negative for dizziness.   Hematological: Negative for adenopathy. Does not bruise/bleed easily.   Psychiatric/Behavioral: Negative for agitation, behavioral problems and confusion.         Objective:       Neurosurgery Physical Exam  Ortho/SPM Exam    Nursing note and vitals reviewed  Gen:Oriented to person, place, and time.             Appears stated age   Head:Normocephalic and atraumatic.  Nose: Nose normal.    Eyes: EOM are normal. Pupils are equal, round, and reactive to light.   Neck: Neck supple. No masses or lesions palpated  Cardiovascular: Intact  distal pulses.    Abdominal: Soft.   Neurological: Alert and oriented to person, place, and time.  No cranial nerve deficit.  Coordination normal. Normal muscle tone  Psychiatric: Normal mood and affect. Behavior is normal.    Neck:  None  Paraspinal tenderness   None  Paraspinal muscle spasms   None  Pain with flexion and extention   WNL  Range of motion    Neg  Spurling's sign     Motor   Right Right Left Left  Level Group   5  5  C5 Deltoid   5  5  C6 Bicep   5  5 4+  Wrist extension    5  5 4+ C7 Triceps   5  5   Wrist flexion   5  5  C8    5  5  T1 Interossei    Sensation  NL Decreased (R/L/BL) Level Sensation    X  C5 Lateral upper arm   X Diminished light touch on the left C6 Thumb and index finger, lat forearm   X Diminished light touch on the left C7 Middle finger   X  C8 Ring and little finger   X  T1 Medial arm      Reflex  2+  Bicep tendon   2+  Brachioradialis   2+  Triceps tendon   Not present  Lopez's   none  Clonus   neg  Tinel's       MRI   C6-C7: Central disc herniation with severe central spinal stenosis.  Impingement on the cord but no abnormal signal in the cord.  Uncovertebral joint disease with mild neural foraminal narrowing.  Assessment:     1. Degenerative disc disease, cervical    2. Cervical radiculopathy      Plan:     Degenerative disc disease, cervical  -     traMADol (ULTRAM) 50 mg tablet; Take 1 tablet (50 mg total) by mouth every 6 (six) hours as needed for Pain.  Dispense: 60 tablet; Refill: 0    Cervical radiculopathy     MRI shows large herniated disc with foramen compression and cord compression C6-7  Will order a CT scan to look for any osteophytes posteriorly  He will get his injection with pain management as directed  Follow-up after imaging is complete in the injection is complete    Thank you for the referral   Please call with any questions    Ari Grossman MD  Neurosurgery

## 2019-12-31 DIAGNOSIS — G99.2 MYELOPATHY CONCURRENT WITH AND DUE TO SPINAL STENOSIS OF CERVICAL REGION: Primary | ICD-10-CM

## 2019-12-31 DIAGNOSIS — M54.12 CERVICAL RADICULOPATHY: ICD-10-CM

## 2019-12-31 DIAGNOSIS — M50.30 DEGENERATIVE DISC DISEASE, CERVICAL: ICD-10-CM

## 2019-12-31 DIAGNOSIS — R93.7 ABNORMAL X-RAY OF CERVICAL SPINE: ICD-10-CM

## 2019-12-31 DIAGNOSIS — M48.02 MYELOPATHY CONCURRENT WITH AND DUE TO SPINAL STENOSIS OF CERVICAL REGION: Primary | ICD-10-CM

## 2020-01-03 ENCOUNTER — PATIENT MESSAGE (OUTPATIENT)
Dept: SURGERY | Facility: HOSPITAL | Age: 50
End: 2020-01-03

## 2020-01-03 DIAGNOSIS — S16.1XXA STRAIN OF NECK MUSCLE, INITIAL ENCOUNTER: ICD-10-CM

## 2020-01-03 DIAGNOSIS — E78.5 HYPERLIPIDEMIA, UNSPECIFIED HYPERLIPIDEMIA TYPE: ICD-10-CM

## 2020-01-03 RX ORDER — CYCLOBENZAPRINE HCL 10 MG
10 TABLET ORAL NIGHTLY PRN
Qty: 30 TABLET | Refills: 0 | Status: ON HOLD | OUTPATIENT
Start: 2020-01-03 | End: 2020-01-10 | Stop reason: HOSPADM

## 2020-01-06 RX ORDER — ATORVASTATIN CALCIUM 40 MG/1
TABLET, FILM COATED ORAL
Qty: 90 TABLET | Refills: 1 | Status: SHIPPED | OUTPATIENT
Start: 2020-01-06 | End: 2020-11-23 | Stop reason: SDUPTHER

## 2020-01-06 RX ORDER — ACETAMINOPHEN 500 MG
1000 TABLET ORAL EVERY 6 HOURS PRN
Status: ON HOLD | COMMUNITY
End: 2023-06-05 | Stop reason: HOSPADM

## 2020-01-06 RX ORDER — HYDROCHLOROTHIAZIDE 12.5 MG/1
CAPSULE ORAL
Qty: 90 CAPSULE | Refills: 1 | Status: SHIPPED | OUTPATIENT
Start: 2020-01-06 | End: 2020-07-16

## 2020-01-06 NOTE — PRE ADMISSION SCREENING
Pre op instructions reviewed with patient per phone:    To confirm, Your surgeon has instructed you:  Surgery is scheduled 01/09/20 at 0730.      Please report to [facility] 1st floor main lobby by 0600.  Pre admit office to call afternoon prior to surgery with final arrival time.  If surgery is on Monday, Pre admit office to call Friday afternoon with with final arrival time changes      INSTRUCTIONS IMPORTANT!!!  ¨ No smoking after 12 midnight, the night before surgery.  ¨ No solid food after 12 midnight, but you may have clear liquids up until 3 hours prior to surgery.  This includes: grape, cranberry, and apple juice (not orange, and no coffee.)   ¨ OK to brush teeth, but no gum, candy or mints!    ¨ Take only these medicines with a small swallow of water-morning of surgery.  Buspar, Gabapentin, Ativan, if needed          ____  Do not wear makeup, including mascara.  ____  No powder, lotions or creams to surgical area.  ____  Please remove all jewelry, including piercings and leave at home.  ____  No money or valuables needed. Please leave at home.  ____  Please bring identification and insurance information to hospital.  ____  If going home the same day, arrange for a ride home. You will not be able to   drive if Anesthesia was used.  ____  Children, under 12 years old, must remain in the waiting room with an adult.  They are not allowed in patient areas.  ____  Wear loose fitting clothing. Allow for dressings, bandages.  ____  Stop Aspirin, Ibuprofen, Motrin and Aleve at least 5-7 days before surgery, unless otherwise instructed by your doctor, or the nurse.   You MAY use Tylenol/acetaminophen until day of surgery.  ____  If you take diabetic medication, do not take am of surgery unless instructed by   Doctor.  ____ Stop taking any Fish Oil supplement or any Vitamins that contain Vitamin E at least 5 days prior to surgery.          Bathing Instructions-- The night before surgery and  the morning prior to coming to the hospital:   -Do not shave the surgical area.   -Shower and wash your hair and body as usual with your regular soap and shampoo.   -Rinse your hair and body completely.   -Use one packet of hibiclens to wash the surgical site (using your hand) gently for 5 minutes.  Do not scrub you skin too hard.   -Do not use hibiclens on your head, face, or genitals.   -Do not wash with regular soap after you use the hibiclens.   -Rinse your body thoroughly.   -Dry with clean, soft towel.  Do not use lotion, cream, deodorant, or powders on   the surgical site.    Use antibacterial soap in place of hibiclens if your surgery is on the head, face or genitals.         Surgical Site Infection    Prevention of surgical site infections:     -Keep incisions clean and dry.   -Do not soak/submerge incisions in water until completely healed.   -Do not apply lotions, powders, creams, or deodorants to site.   -Always make sure hands are cleaned with antibacterial soap/ alcohol-based   prior to touching the surgical site.  (This includes doctors, nurses, staff, and yourself.)    Signs and symptoms:   -Redness and pain around the area where you had surgery   -Drainage of cloudy fluid from your surgical wound   -Fever over 100.4  I have read or had read and explained to me, and understand the above information.

## 2020-01-08 ENCOUNTER — ANESTHESIA EVENT (OUTPATIENT)
Dept: SURGERY | Facility: HOSPITAL | Age: 50
End: 2020-01-08
Payer: COMMERCIAL

## 2020-01-09 ENCOUNTER — ANESTHESIA (OUTPATIENT)
Dept: SURGERY | Facility: HOSPITAL | Age: 50
End: 2020-01-09
Payer: COMMERCIAL

## 2020-01-09 ENCOUNTER — HOSPITAL ENCOUNTER (OUTPATIENT)
Facility: HOSPITAL | Age: 50
Discharge: HOME | End: 2020-01-10
Attending: NEUROLOGICAL SURGERY | Admitting: NEUROLOGICAL SURGERY
Payer: COMMERCIAL

## 2020-01-09 DIAGNOSIS — M48.02 MYELOPATHY CONCURRENT WITH AND DUE TO SPINAL STENOSIS OF CERVICAL REGION: ICD-10-CM

## 2020-01-09 DIAGNOSIS — M54.2 NECK PAIN: ICD-10-CM

## 2020-01-09 DIAGNOSIS — M54.12 CERVICAL RADICULOPATHY: ICD-10-CM

## 2020-01-09 DIAGNOSIS — M50.30 DDD (DEGENERATIVE DISC DISEASE), CERVICAL: Primary | ICD-10-CM

## 2020-01-09 DIAGNOSIS — G99.2 MYELOPATHY CONCURRENT WITH AND DUE TO SPINAL STENOSIS OF CERVICAL REGION: ICD-10-CM

## 2020-01-09 PROCEDURE — 63081 PR REMV VERT BODY,CERV,ONE SGMT: ICD-10-PCS | Mod: AS,,, | Performed by: PHYSICIAN ASSISTANT

## 2020-01-09 PROCEDURE — 88304 TISSUE EXAM BY PATHOLOGIST: CPT | Mod: 26,,, | Performed by: PATHOLOGY

## 2020-01-09 PROCEDURE — 27201423 OPTIME MED/SURG SUP & DEVICES STERILE SUPPLY: Performed by: NEUROLOGICAL SURGERY

## 2020-01-09 PROCEDURE — 27800903 OPTIME MED/SURG SUP & DEVICES OTHER IMPLANTS: Performed by: NEUROLOGICAL SURGERY

## 2020-01-09 PROCEDURE — 22845 PR ANTERIOR INSTRUMENTATION 2-3 VERTEBRAL SEGMENTS: ICD-10-PCS | Mod: AS,59,, | Performed by: PHYSICIAN ASSISTANT

## 2020-01-09 PROCEDURE — 25000003 PHARM REV CODE 250: Performed by: NURSE ANESTHETIST, CERTIFIED REGISTERED

## 2020-01-09 PROCEDURE — 22845 INSERT SPINE FIXATION DEVICE: CPT | Mod: 59,,, | Performed by: NEUROLOGICAL SURGERY

## 2020-01-09 PROCEDURE — 22854 INSJ BIOMECHANICAL DEVICE: CPT | Mod: AS,,, | Performed by: PHYSICIAN ASSISTANT

## 2020-01-09 PROCEDURE — 22845 INSERT SPINE FIXATION DEVICE: CPT | Mod: AS,59,, | Performed by: PHYSICIAN ASSISTANT

## 2020-01-09 PROCEDURE — 36000710: Performed by: NEUROLOGICAL SURGERY

## 2020-01-09 PROCEDURE — 25000003 PHARM REV CODE 250: Performed by: PHYSICIAN ASSISTANT

## 2020-01-09 PROCEDURE — 22554 ARTHRD ANT NTRBD MIN DSC CRV: CPT | Mod: AS,51,, | Performed by: PHYSICIAN ASSISTANT

## 2020-01-09 PROCEDURE — 20930 SP BONE ALGRFT MORSEL ADD-ON: CPT | Mod: ,,, | Performed by: NEUROLOGICAL SURGERY

## 2020-01-09 PROCEDURE — C1729 CATH, DRAINAGE: HCPCS | Performed by: NEUROLOGICAL SURGERY

## 2020-01-09 PROCEDURE — 63600175 PHARM REV CODE 636 W HCPCS: Performed by: PHYSICIAN ASSISTANT

## 2020-01-09 PROCEDURE — 20936 PR AUTOGRAFT SPINE SURGERY LOCAL FROM SAME INCISION: ICD-10-PCS | Mod: ,,, | Performed by: NEUROLOGICAL SURGERY

## 2020-01-09 PROCEDURE — 22845 PR ANTERIOR INSTRUMENTATION 2-3 VERTEBRAL SEGMENTS: ICD-10-PCS | Mod: 59,,, | Performed by: NEUROLOGICAL SURGERY

## 2020-01-09 PROCEDURE — 88311 DECALCIFY TISSUE: CPT | Performed by: PATHOLOGY

## 2020-01-09 PROCEDURE — 88311 DECALCIFY TISSUE: CPT | Mod: 26,,, | Performed by: PATHOLOGY

## 2020-01-09 PROCEDURE — 63081 REMOVE VERT BODY DCMPRN CRVL: CPT | Mod: AS,,, | Performed by: PHYSICIAN ASSISTANT

## 2020-01-09 PROCEDURE — 22585 ARTHRD ANT NTRBD MIN DSC EA: CPT | Mod: ,,, | Performed by: NEUROLOGICAL SURGERY

## 2020-01-09 PROCEDURE — 63600175 PHARM REV CODE 636 W HCPCS: Performed by: ANESTHESIOLOGY

## 2020-01-09 PROCEDURE — 63600175 PHARM REV CODE 636 W HCPCS: Performed by: NURSE ANESTHETIST, CERTIFIED REGISTERED

## 2020-01-09 PROCEDURE — 63081 PR REMV VERT BODY,CERV,ONE SGMT: ICD-10-PCS | Mod: ,,, | Performed by: NEUROLOGICAL SURGERY

## 2020-01-09 PROCEDURE — 37000009 HC ANESTHESIA EA ADD 15 MINS: Performed by: NEUROLOGICAL SURGERY

## 2020-01-09 PROCEDURE — 22585 PR ARTHRODESIS ANT INTERBODY MIN DISCECTOMY,EA ADDL: ICD-10-PCS | Mod: ,,, | Performed by: NEUROLOGICAL SURGERY

## 2020-01-09 PROCEDURE — 22554 PR ARTHRODESIS ANT INTERBODY MIN DISCECTOMY, CERVICAL BELOW C2: ICD-10-PCS | Mod: 51,,, | Performed by: NEUROLOGICAL SURGERY

## 2020-01-09 PROCEDURE — 25000003 PHARM REV CODE 250: Performed by: NEUROLOGICAL SURGERY

## 2020-01-09 PROCEDURE — C1713 ANCHOR/SCREW BN/BN,TIS/BN: HCPCS | Performed by: NEUROLOGICAL SURGERY

## 2020-01-09 PROCEDURE — 22854 PR INS BIOMECH DEV, VERT CORPECTOMY W/INTRBODY ARTHRODESIS EA INTERSPC: ICD-10-PCS | Mod: ,,, | Performed by: NEUROLOGICAL SURGERY

## 2020-01-09 PROCEDURE — 88304 TISSUE EXAM BY PATHOLOGIST: CPT | Performed by: PATHOLOGY

## 2020-01-09 PROCEDURE — 22854 INSJ BIOMECHANICAL DEVICE: CPT | Mod: ,,, | Performed by: NEUROLOGICAL SURGERY

## 2020-01-09 PROCEDURE — 36000711: Performed by: NEUROLOGICAL SURGERY

## 2020-01-09 PROCEDURE — 63600175 PHARM REV CODE 636 W HCPCS: Performed by: NEUROLOGICAL SURGERY

## 2020-01-09 PROCEDURE — 88311 PR  DECALCIFY TISSUE: ICD-10-PCS | Mod: 26,,, | Performed by: PATHOLOGY

## 2020-01-09 PROCEDURE — 22585 ARTHRD ANT NTRBD MIN DSC EA: CPT | Mod: AS,,, | Performed by: PHYSICIAN ASSISTANT

## 2020-01-09 PROCEDURE — 20930 PR ALLOGRAFT FOR SPINE SURGERY ONLY MORSELIZED: ICD-10-PCS | Mod: ,,, | Performed by: NEUROLOGICAL SURGERY

## 2020-01-09 PROCEDURE — 71000039 HC RECOVERY, EACH ADD'L HOUR: Performed by: NEUROLOGICAL SURGERY

## 2020-01-09 PROCEDURE — 22854 PR INS BIOMECH DEV, VERT CORPECTOMY W/INTRBODY ARTHRODESIS EA INTERSPC: ICD-10-PCS | Mod: AS,,, | Performed by: PHYSICIAN ASSISTANT

## 2020-01-09 PROCEDURE — 37000008 HC ANESTHESIA 1ST 15 MINUTES: Performed by: NEUROLOGICAL SURGERY

## 2020-01-09 PROCEDURE — 22585 PR ARTHRODESIS ANT INTERBODY MIN DISCECTOMY,EA ADDL: ICD-10-PCS | Mod: AS,,, | Performed by: PHYSICIAN ASSISTANT

## 2020-01-09 PROCEDURE — 88304 PR  SURG PATH,LEVEL III: ICD-10-PCS | Mod: 26,,, | Performed by: PATHOLOGY

## 2020-01-09 PROCEDURE — 20936 SP BONE AGRFT LOCAL ADD-ON: CPT | Mod: ,,, | Performed by: NEUROLOGICAL SURGERY

## 2020-01-09 PROCEDURE — 22554 PR ARTHRODESIS ANT INTERBODY MIN DISCECTOMY, CERVICAL BELOW C2: ICD-10-PCS | Mod: AS,51,, | Performed by: PHYSICIAN ASSISTANT

## 2020-01-09 PROCEDURE — 63081 REMOVE VERT BODY DCMPRN CRVL: CPT | Mod: ,,, | Performed by: NEUROLOGICAL SURGERY

## 2020-01-09 PROCEDURE — 22554 ARTHRD ANT NTRBD MIN DSC CRV: CPT | Mod: 51,,, | Performed by: NEUROLOGICAL SURGERY

## 2020-01-09 PROCEDURE — 71000033 HC RECOVERY, INTIAL HOUR: Performed by: NEUROLOGICAL SURGERY

## 2020-01-09 RX ORDER — ONDANSETRON 2 MG/ML
INJECTION INTRAMUSCULAR; INTRAVENOUS
Status: DISCONTINUED | OUTPATIENT
Start: 2020-01-09 | End: 2020-01-09

## 2020-01-09 RX ORDER — FENTANYL CITRATE 50 UG/ML
INJECTION, SOLUTION INTRAMUSCULAR; INTRAVENOUS
Status: DISCONTINUED | OUTPATIENT
Start: 2020-01-09 | End: 2020-01-09

## 2020-01-09 RX ORDER — EPHEDRINE SULFATE 50 MG/ML
INJECTION, SOLUTION INTRAVENOUS
Status: DISCONTINUED | OUTPATIENT
Start: 2020-01-09 | End: 2020-01-09

## 2020-01-09 RX ORDER — PROPOFOL 10 MG/ML
VIAL (ML) INTRAVENOUS CONTINUOUS PRN
Status: DISCONTINUED | OUTPATIENT
Start: 2020-01-09 | End: 2020-01-09

## 2020-01-09 RX ORDER — DEXAMETHASONE SODIUM PHOSPHATE 4 MG/ML
INJECTION, SOLUTION INTRA-ARTICULAR; INTRALESIONAL; INTRAMUSCULAR; INTRAVENOUS; SOFT TISSUE
Status: DISCONTINUED | OUTPATIENT
Start: 2020-01-09 | End: 2020-01-09

## 2020-01-09 RX ORDER — HYDROMORPHONE HYDROCHLORIDE 2 MG/ML
0.2 INJECTION, SOLUTION INTRAMUSCULAR; INTRAVENOUS; SUBCUTANEOUS EVERY 5 MIN PRN
Status: DISCONTINUED | OUTPATIENT
Start: 2020-01-09 | End: 2020-01-09

## 2020-01-09 RX ORDER — ACETAMINOPHEN 325 MG/1
325 TABLET ORAL EVERY 6 HOURS PRN
Status: DISCONTINUED | OUTPATIENT
Start: 2020-01-09 | End: 2020-01-10 | Stop reason: HOSPADM

## 2020-01-09 RX ORDER — MEPERIDINE HYDROCHLORIDE 50 MG/ML
12.5 INJECTION INTRAMUSCULAR; INTRAVENOUS; SUBCUTANEOUS ONCE AS NEEDED
Status: DISCONTINUED | OUTPATIENT
Start: 2020-01-09 | End: 2020-01-09

## 2020-01-09 RX ORDER — VANCOMYCIN HCL IN 5 % DEXTROSE 1.5G/250ML
1500 PLASTIC BAG, INJECTION (ML) INTRAVENOUS
Status: COMPLETED | OUTPATIENT
Start: 2020-01-09 | End: 2020-01-10

## 2020-01-09 RX ORDER — ONDANSETRON 2 MG/ML
4 INJECTION INTRAMUSCULAR; INTRAVENOUS EVERY 8 HOURS PRN
Status: DISCONTINUED | OUTPATIENT
Start: 2020-01-09 | End: 2020-01-10 | Stop reason: HOSPADM

## 2020-01-09 RX ORDER — VANCOMYCIN HCL IN 5 % DEXTROSE 1.5G/250ML
1500 PLASTIC BAG, INJECTION (ML) INTRAVENOUS
Status: COMPLETED | OUTPATIENT
Start: 2020-01-09 | End: 2020-01-09

## 2020-01-09 RX ORDER — DEXTROSE MONOHYDRATE AND SODIUM CHLORIDE 5; .9 G/100ML; G/100ML
INJECTION, SOLUTION INTRAVENOUS CONTINUOUS
Status: DISCONTINUED | OUTPATIENT
Start: 2020-01-09 | End: 2020-01-09

## 2020-01-09 RX ORDER — ROCURONIUM BROMIDE 10 MG/ML
INJECTION, SOLUTION INTRAVENOUS
Status: DISCONTINUED | OUTPATIENT
Start: 2020-01-09 | End: 2020-01-09

## 2020-01-09 RX ORDER — OXYCODONE AND ACETAMINOPHEN 10; 325 MG/1; MG/1
1 TABLET ORAL EVERY 4 HOURS PRN
Status: DISCONTINUED | OUTPATIENT
Start: 2020-01-09 | End: 2020-01-10 | Stop reason: HOSPADM

## 2020-01-09 RX ORDER — PROPOFOL 10 MG/ML
VIAL (ML) INTRAVENOUS
Status: DISCONTINUED | OUTPATIENT
Start: 2020-01-09 | End: 2020-01-09

## 2020-01-09 RX ORDER — LOSARTAN POTASSIUM 50 MG/1
100 TABLET ORAL DAILY
Status: DISCONTINUED | OUTPATIENT
Start: 2020-01-09 | End: 2020-01-10 | Stop reason: HOSPADM

## 2020-01-09 RX ORDER — VANCOMYCIN HYDROCHLORIDE 1 G/20ML
INJECTION, POWDER, LYOPHILIZED, FOR SOLUTION INTRAVENOUS
Status: DISCONTINUED | OUTPATIENT
Start: 2020-01-09 | End: 2020-01-09 | Stop reason: HOSPADM

## 2020-01-09 RX ORDER — SODIUM CHLORIDE, SODIUM LACTATE, POTASSIUM CHLORIDE, CALCIUM CHLORIDE 600; 310; 30; 20 MG/100ML; MG/100ML; MG/100ML; MG/100ML
INJECTION, SOLUTION INTRAVENOUS CONTINUOUS PRN
Status: DISCONTINUED | OUTPATIENT
Start: 2020-01-09 | End: 2020-01-09

## 2020-01-09 RX ORDER — ATORVASTATIN CALCIUM 10 MG/1
20 TABLET, FILM COATED ORAL NIGHTLY
Status: DISCONTINUED | OUTPATIENT
Start: 2020-01-09 | End: 2020-01-10 | Stop reason: HOSPADM

## 2020-01-09 RX ORDER — BUSPIRONE HYDROCHLORIDE 5 MG/1
5 TABLET ORAL 3 TIMES DAILY
Status: DISCONTINUED | OUTPATIENT
Start: 2020-01-09 | End: 2020-01-10 | Stop reason: HOSPADM

## 2020-01-09 RX ORDER — SODIUM CHLORIDE 0.9 % (FLUSH) 0.9 %
3 SYRINGE (ML) INJECTION EVERY 8 HOURS
Status: DISCONTINUED | OUTPATIENT
Start: 2020-01-09 | End: 2020-01-09

## 2020-01-09 RX ORDER — DIPHENHYDRAMINE HYDROCHLORIDE 50 MG/ML
25 INJECTION INTRAMUSCULAR; INTRAVENOUS EVERY 6 HOURS PRN
Status: DISCONTINUED | OUTPATIENT
Start: 2020-01-09 | End: 2020-01-09

## 2020-01-09 RX ORDER — NEOSTIGMINE METHYLSULFATE 1 MG/ML
INJECTION, SOLUTION INTRAVENOUS
Status: DISCONTINUED | OUTPATIENT
Start: 2020-01-09 | End: 2020-01-09

## 2020-01-09 RX ORDER — GLYCOPYRROLATE 0.2 MG/ML
INJECTION INTRAMUSCULAR; INTRAVENOUS
Status: DISCONTINUED | OUTPATIENT
Start: 2020-01-09 | End: 2020-01-09

## 2020-01-09 RX ORDER — METHOCARBAMOL 750 MG/1
750 TABLET, FILM COATED ORAL EVERY 8 HOURS PRN
Status: DISCONTINUED | OUTPATIENT
Start: 2020-01-09 | End: 2020-01-10 | Stop reason: HOSPADM

## 2020-01-09 RX ORDER — MIDAZOLAM HYDROCHLORIDE 1 MG/ML
INJECTION, SOLUTION INTRAMUSCULAR; INTRAVENOUS
Status: DISCONTINUED | OUTPATIENT
Start: 2020-01-09 | End: 2020-01-09

## 2020-01-09 RX ORDER — OXYCODONE AND ACETAMINOPHEN 5; 325 MG/1; MG/1
1 TABLET ORAL EVERY 4 HOURS PRN
Status: DISCONTINUED | OUTPATIENT
Start: 2020-01-09 | End: 2020-01-10 | Stop reason: HOSPADM

## 2020-01-09 RX ORDER — SUCCINYLCHOLINE CHLORIDE 20 MG/ML
INJECTION INTRAMUSCULAR; INTRAVENOUS
Status: DISCONTINUED | OUTPATIENT
Start: 2020-01-09 | End: 2020-01-09

## 2020-01-09 RX ORDER — LORAZEPAM 0.5 MG/1
0.5 TABLET ORAL DAILY PRN
Status: DISCONTINUED | OUTPATIENT
Start: 2020-01-09 | End: 2020-01-10 | Stop reason: HOSPADM

## 2020-01-09 RX ORDER — PHENYLEPHRINE HYDROCHLORIDE 10 MG/ML
INJECTION INTRAVENOUS
Status: DISCONTINUED | OUTPATIENT
Start: 2020-01-09 | End: 2020-01-09

## 2020-01-09 RX ORDER — METOCLOPRAMIDE HYDROCHLORIDE 5 MG/ML
10 INJECTION INTRAMUSCULAR; INTRAVENOUS EVERY 10 MIN PRN
Status: DISCONTINUED | OUTPATIENT
Start: 2020-01-09 | End: 2020-01-09

## 2020-01-09 RX ORDER — LIDOCAINE HYDROCHLORIDE 10 MG/ML
INJECTION, SOLUTION EPIDURAL; INFILTRATION; INTRACAUDAL; PERINEURAL
Status: DISCONTINUED | OUTPATIENT
Start: 2020-01-09 | End: 2020-01-09

## 2020-01-09 RX ORDER — HYDROCHLOROTHIAZIDE 12.5 MG/1
12.5 TABLET ORAL DAILY
Status: DISCONTINUED | OUTPATIENT
Start: 2020-01-09 | End: 2020-01-10 | Stop reason: HOSPADM

## 2020-01-09 RX ORDER — BACITRACIN 50000 [IU]/1
INJECTION, POWDER, FOR SOLUTION INTRAMUSCULAR
Status: DISCONTINUED | OUTPATIENT
Start: 2020-01-09 | End: 2020-01-09 | Stop reason: HOSPADM

## 2020-01-09 RX ADMIN — VANCOMYCIN HYDROCHLORIDE 1500 MG: 100 INJECTION, POWDER, LYOPHILIZED, FOR SOLUTION INTRAVENOUS at 07:01

## 2020-01-09 RX ADMIN — SUCCINYLCHOLINE CHLORIDE 140 MG: 20 INJECTION, SOLUTION INTRAMUSCULAR; INTRAVENOUS at 07:01

## 2020-01-09 RX ADMIN — FENTANYL CITRATE 100 MCG: 50 INJECTION, SOLUTION INTRAMUSCULAR; INTRAVENOUS at 07:01

## 2020-01-09 RX ADMIN — PHENYLEPHRINE HYDROCHLORIDE 100 MCG: 10 INJECTION INTRAVENOUS at 09:01

## 2020-01-09 RX ADMIN — OXYCODONE HYDROCHLORIDE AND ACETAMINOPHEN 1 TABLET: 10; 325 TABLET ORAL at 10:01

## 2020-01-09 RX ADMIN — SODIUM CHLORIDE, SODIUM LACTATE, POTASSIUM CHLORIDE, AND CALCIUM CHLORIDE: 600; 310; 30; 20 INJECTION, SOLUTION INTRAVENOUS at 10:01

## 2020-01-09 RX ADMIN — DEXAMETHASONE SODIUM PHOSPHATE 8 MG: 4 INJECTION, SOLUTION INTRA-ARTICULAR; INTRALESIONAL; INTRAMUSCULAR; INTRAVENOUS; SOFT TISSUE at 07:01

## 2020-01-09 RX ADMIN — HYDROMORPHONE HYDROCHLORIDE 0.2 MG: 2 INJECTION INTRAMUSCULAR; INTRAVENOUS; SUBCUTANEOUS at 12:01

## 2020-01-09 RX ADMIN — OXYCODONE HYDROCHLORIDE AND ACETAMINOPHEN 1 TABLET: 5; 325 TABLET ORAL at 06:01

## 2020-01-09 RX ADMIN — ROCURONIUM BROMIDE 25 MG: 10 INJECTION, SOLUTION INTRAVENOUS at 08:01

## 2020-01-09 RX ADMIN — ROCURONIUM BROMIDE 5 MG: 10 INJECTION, SOLUTION INTRAVENOUS at 07:01

## 2020-01-09 RX ADMIN — ATORVASTATIN CALCIUM 20 MG: 10 TABLET, FILM COATED ORAL at 08:01

## 2020-01-09 RX ADMIN — PROPOFOL 200 MG: 10 INJECTION, EMULSION INTRAVENOUS at 07:01

## 2020-01-09 RX ADMIN — PHENYLEPHRINE HYDROCHLORIDE 100 MCG: 10 INJECTION INTRAVENOUS at 08:01

## 2020-01-09 RX ADMIN — OXYCODONE HYDROCHLORIDE AND ACETAMINOPHEN 1 TABLET: 10; 325 TABLET ORAL at 01:01

## 2020-01-09 RX ADMIN — PROPOFOL: 10 INJECTION, EMULSION INTRAVENOUS at 09:01

## 2020-01-09 RX ADMIN — FENTANYL CITRATE 50 MCG: 50 INJECTION, SOLUTION INTRAMUSCULAR; INTRAVENOUS at 11:01

## 2020-01-09 RX ADMIN — BUSPIRONE HYDROCHLORIDE 5 MG: 5 TABLET ORAL at 08:01

## 2020-01-09 RX ADMIN — NEOSTIGMINE METHYLSULFATE 3 MG: 1 INJECTION INTRAVENOUS at 11:01

## 2020-01-09 RX ADMIN — EPHEDRINE SULFATE 5 MG: 50 INJECTION INTRAVENOUS at 10:01

## 2020-01-09 RX ADMIN — EPHEDRINE SULFATE 10 MG: 50 INJECTION INTRAVENOUS at 10:01

## 2020-01-09 RX ADMIN — LOSARTAN POTASSIUM 100 MG: 50 TABLET, FILM COATED ORAL at 01:01

## 2020-01-09 RX ADMIN — ONDANSETRON 4 MG: 2 INJECTION, SOLUTION INTRAMUSCULAR; INTRAVENOUS at 07:01

## 2020-01-09 RX ADMIN — SODIUM CHLORIDE, SODIUM LACTATE, POTASSIUM CHLORIDE, AND CALCIUM CHLORIDE: 600; 310; 30; 20 INJECTION, SOLUTION INTRAVENOUS at 07:01

## 2020-01-09 RX ADMIN — FENTANYL CITRATE 50 MCG: 50 INJECTION, SOLUTION INTRAMUSCULAR; INTRAVENOUS at 10:01

## 2020-01-09 RX ADMIN — PROPOFOL 125 MCG/KG/MIN: 10 INJECTION, EMULSION INTRAVENOUS at 08:01

## 2020-01-09 RX ADMIN — ONDANSETRON 4 MG: 2 INJECTION INTRAMUSCULAR; INTRAVENOUS at 02:01

## 2020-01-09 RX ADMIN — HYDROCHLOROTHIAZIDE 12.5 MG: 12.5 TABLET ORAL at 01:01

## 2020-01-09 RX ADMIN — LIDOCAINE HYDROCHLORIDE 50 MG: 10 INJECTION, SOLUTION EPIDURAL; INFILTRATION; INTRACAUDAL; PERINEURAL at 07:01

## 2020-01-09 RX ADMIN — EPHEDRINE SULFATE 10 MG: 50 INJECTION INTRAVENOUS at 09:01

## 2020-01-09 RX ADMIN — METOPROLOL SUCCINATE 37.5 MG: 25 TABLET, EXTENDED RELEASE ORAL at 08:01

## 2020-01-09 RX ADMIN — ROBINUL 0.4 MG: 0.2 INJECTION INTRAMUSCULAR; INTRAVENOUS at 11:01

## 2020-01-09 RX ADMIN — PROPOFOL 140 MCG/KG/MIN: 10 INJECTION, EMULSION INTRAVENOUS at 07:01

## 2020-01-09 RX ADMIN — EPHEDRINE SULFATE 5 MG: 50 INJECTION INTRAVENOUS at 09:01

## 2020-01-09 RX ADMIN — MIDAZOLAM 2 MG: 1 INJECTION INTRAMUSCULAR; INTRAVENOUS at 07:01

## 2020-01-09 RX ADMIN — PROPOFOL: 10 INJECTION, EMULSION INTRAVENOUS at 11:01

## 2020-01-09 RX ADMIN — REMIFENTANIL HYDROCHLORIDE 0.05 MCG/KG/MIN: 1 INJECTION, POWDER, LYOPHILIZED, FOR SOLUTION INTRAVENOUS at 07:01

## 2020-01-09 NOTE — PLAN OF CARE
Ambulated without difficulty to preop. Left arm weakness noted with good pulses and some numbness and tingling.  Right arm started having elbow pain. Other neuro assessment wdl. preop completed without incident.

## 2020-01-09 NOTE — ANESTHESIA POSTPROCEDURE EVALUATION
Anesthesia Post Evaluation    Patient: Sae Santiago    Procedure(s) Performed: Procedure(s) (LRB):  DISCECTOMY, SPINE, CERVICAL, ANTERIOR APPROACH, WITH FUSION   (Bilateral)  CORPECTOMY, SPINE, CERVICAL    Final Anesthesia Type: general    Patient location during evaluation: PACU  Patient participation: Yes- Able to Participate  Level of consciousness: awake and alert  Post-procedure vital signs: reviewed and stable  Pain management: adequate  Airway patency: patent    PONV status at discharge: No PONV  Anesthetic complications: no      Cardiovascular status: hemodynamically stable  Respiratory status: spontaneous ventilation  Hydration status: euvolemic  Follow-up not needed.          Vitals Value Taken Time   /81 1/9/2020  4:52 PM   Temp 36.6 °C (97.8 °F) 1/9/2020  4:52 PM   Pulse 113 1/9/2020  4:52 PM   Resp 20 1/9/2020  4:52 PM   SpO2 93 % 1/9/2020  4:52 PM         Event Time     Out of Recovery 13:24:23          Pain/Yasmine Score: Pain Rating Prior to Med Admin: 7 (1/9/2020  1:53 PM)  Pain Rating Post Med Admin: 2 (1/9/2020  2:53 PM)  Yasmine Score: 10 (1/9/2020  1:15 PM)

## 2020-01-09 NOTE — ANESTHESIA POSTPROCEDURE EVALUATION
Anesthesia Post Evaluation    Patient: Sae Santiago    Procedure(s) Performed: Procedure(s) (LRB):  DISCECTOMY, SPINE, CERVICAL, ANTERIOR APPROACH, WITH FUSION   (Bilateral)  CORPECTOMY, SPINE, CERVICAL    Final Anesthesia Type: general    Patient location during evaluation: PACU  Patient participation: Yes- Able to Participate  Level of consciousness: awake and alert  Post-procedure vital signs: reviewed and stable  Airway patency: patent  ABDIEL mitigation strategies: Preoperative initiation of continuous positive airway pressure (CPAP) or non-invasive positive pressure ventilation (NIPPV)  PONV status at discharge: No PONV  Anesthetic complications: no      Cardiovascular status: hemodynamically stable  Respiratory status: unassisted and spontaneous ventilation  Hydration status: euvolemic  Follow-up not needed.          Vitals Value Taken Time   /59 1/9/2020 12:21 PM   Temp 36.7 °C (98.1 °F) 1/9/2020  6:30 AM   Pulse 85 1/9/2020 12:21 PM   Resp 20 1/9/2020 12:21 PM   SpO2 100 % 1/9/2020 12:21 PM   Vitals shown include unvalidated device data.      No case tracking events are documented in the log.      Pain/Yasmine Score: No data recorded

## 2020-01-09 NOTE — OP NOTE
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  C6 corpectomy.  2.  Use of microscope  3.  Anterior plate C5-C7  4.  Placement of Medtronic stratosphere expandable interbody cage  5.  Autograft bone  6.  Allograft bone    Significant Surgical Tasks Conducted by the Assistant(s), if Applicable: retraction during initial exposure     Complications: No    Estimated Blood Loss (EBL): 50 mL           Specimens:   Specimen (12h ago, onward)    None           Implants:   Implant Name Type Inv. Item Serial No.  Lot No. LRB No. Used   PIN DISTRACTION 12MM - [id number]  PIN DISTRACTION 12MM  AESCULAP [id number] Bilateral 2   [facility]     [id number]  1   PLATE     [employer] [place]   1   SCREWS    [employer] [place]   4   COPECTOMY CAGE    [employer] [place]   1              Condition: Good    Disposition: PACU - hemodynamically stable.    Attestation: I was present and scrubbed for the entire procedure.     Patient is 49-year-old male presented with left upper extremity weakness.  MRI showed he had a large herniated disc  extending from the C6-C7 vertebral body extending superiorly behind the vertebral body of C6.  He has significant weakness on the left side.  CT scan showed he had a posterior osteophyte midline at this level.  Discussed treatment options and he agreed and consented to a C6-7 versus C6 corpectomy.    Surgical Risks:  The patient was well apprised of all objectives benefits risk and potential complications of the procedure, including but not limited to; worsening of current status, the possible need for further procedures, the risk of infection, headaches, CSF leak, possible spinal cord injury resulting in paralysis, infection, neck hematoma and hoarseness of voice, injury of major vessels causing hemorrhage, stroke, loss of language function, coma and even death.  Informed consent was obtained and secured in the chart after the patient voiced understanding and decided to proceed with the operation.     The patient was transferred onto the operating room table.  He was given preoperative prophylactic IV antibiotics.  The patient was sedated and intubated without difficulty by the anesthesia service.  Eyes were taped shut after ointment was applied to prevent corneal abrasion.  A Anastacia Hugger was placed over the lower body to maintain control of the core body temperature.  A Bowman catheter was inserted.  The neuro monitoring service then fixated there electrodes in the appropriate position.  The patient was placed supine with a sheet under the shoulder and a gel roll under the head.  All pressure points were carefully padded.     The skin was prepped and draped in the standard surgical fashion in the planned incision was made using on marker pen as well as fluoroscopy.      A left-sided linear incision was made along the margins of the sternocleidomastoid  The platysma was carefully divided.     Once the medial border was identified using a Metzenbaum a plane was created medially.  Dissection was carried down medial to  the carotid artery down to the anterior portion of the vertebral body.  Retractors were then placed in the disc was exposed.     The C5-7 disc spaces were confirmed by placing a marker needle into the disc space and visualized with fluoroscopy.  The longus coli was undermined on either side using monopolar cautery along to expose the uncovertebral joint.  Self retraining retractors were inserted underneath the longus coli musculature.  A 11.  Blade was used to initiate the diskectomy after incision of the annulus.  The diskectomy was carried out utilizing pituitary rongeurs, Kerrison Montesinos and curettes to widen and cleaned the disc space.      The operating microscope was draped with sterile drapes and brought into the operative field and with microsurgical technique; the diskectomy was carried down to the level of the posterior longitudinal ligament and widened laterally to reveal bilateral uncovertebral joints.       Next using the Midas drill the C6 vertebral body was drilled posteriorly until the posterior longitudinal ligament was identified.  The bone was saved and preserved and sterile saline to be used for the autograft fusion.    Using a high-speed electric drill the posterior osteophytes were removed.  At the C 6-7 level there was a a very large disc fragment causing compression of the thecal sac posteriorly.  The thecal sac at this level was severely compressed by the disc fragment.  There was evidence that the fragment was adherent to the underlying dura.  On removing the disc it appeared that there may be some arachnoid evident however no CSF was identified.  A small piece of tachoseal and duraseal was applied onto the anterior portion  of the thecal sac.   At this point I turned my attention to the C5-6 level in a similar diskectomy and foraminotomy  was carried out bilaterally. .     To achieve arthrodesis  the endplates were prepped and all disc material was removed.      [facility] EHR  expandable interbody template was used to measure the intervertebral space. The cages were then filled with several cc of DBM as well as the patient's own bone saved from the prior corpectomy for arthrodesis.  The interbody device was inserted without any difficulty.  And then expanded and locked into place  without any difficulty.     Next a  Medtronic 23-33mm expandable cage was deployed.  This was fastened with4 x 15 mm screws which were locked into place.      Fluoroscopy was used to confirm good placement of the cage screws. The wound was irrigated with bacitracin solution and hemostasis was of trained.  1 g of vancomycin powder was placed to the surgical cavity.  And 8-0  FIONA  drain was left in the place in the prevertebral space. Platysma was approximated using 0 Vicryl sutures as and  2-0 Vicryl sutures on the subcutaneous tissue.  Dermabond was used on the skin.      Sponge, needle and instrument counts were all accounted for at the end of the case x2.  The patient tolerated the procedure well and was transferred to the recovery room in a stable condition.  I was present for the entire portion of the procedure.     Neuro monitoring  was used during the procedure and was stable throughout and at the end of the case.

## 2020-01-09 NOTE — TRANSFER OF CARE
"Anesthesia Transfer of Care Note    Patient: Sae Santiago    Procedure(s) Performed: Procedure(s) (LRB):  DISCECTOMY, SPINE, CERVICAL, ANTERIOR APPROACH, WITH FUSION   (Bilateral)  CORPECTOMY, SPINE, CERVICAL    Patient location: PACU    Anesthesia Type: general    Transport from OR: Transported from OR on room air with adequate spontaneous ventilation    Post pain: adequate analgesia    Post assessment: no apparent anesthetic complications    Post vital signs: stable    Level of consciousness: awake    Complications: none    Transfer of care protocol was followed      Last vitals:   Visit Vitals  BP (!) 118/59 (BP Location: Right arm)   Pulse 84   Temp 36.7 °C (98.1 °F) (Skin)   Resp 18   Ht 6' 1" (1.854 m)   Wt 103.4 kg (227 lb 15.3 oz)   SpO2 98%   BMI 30.08 kg/m²     "

## 2020-01-09 NOTE — INTERVAL H&P NOTE
The patient has been examined and the H&P has been reviewed:    I concur with the findings and no changes have occurred since H&P was written.    C6-7 ACDF vs C6 corpectomy   Anesthesia/Surgery risks, benefits and alternative options discussed and understood by patient/family.          Active Hospital Problems    Diagnosis  POA    DDD (degenerative disc disease), cervical [M50.30]  Yes      Resolved Hospital Problems   No resolved problems to display.

## 2020-01-09 NOTE — ANESTHESIA PREPROCEDURE EVALUATION
01/09/2020  Sae Santiago is a 49 y.o., male.    Pre-op Assessment    I have reviewed the Patient Summary Reports.     I have reviewed the Medications.     Review of Systems  Anesthesia Hx:  No problems with previous Anesthesia   Denies Personal Hx of Anesthesia complications.   Social:  Non-Smoker    Hematology/Oncology:  Hematology Normal   Oncology Normal     EENT/Dental:EENT/Dental Normal   Cardiovascular:   Exercise tolerance: good Hypertension ECG has been reviewed.    Pulmonary:  Pulmonary Normal    Renal/:  Renal/ Normal     Hepatic/GI:   GERD    Musculoskeletal:  Musculoskeletal Normal    Neurological:  Neurology Normal    Endocrine:  Endocrine Normal    Dermatological:  Skin Normal    Psych:  Psychiatric Normal           Physical Exam  General:  Well nourished    Airway/Jaw/Neck:  Airway Findings: Mouth Opening: Normal Tongue: Normal  Mallampati: III      Dental:  Dental Findings: In tact   Chest/Lungs:  Chest/Lungs Clear    Heart/Vascular:  Heart Findings: Normal Heart murmur: negative       Mental Status:  Mental Status Findings:  Cooperative, Alert and Oriented         Anesthesia Plan  Type of Anesthesia, risks & benefits discussed:  Anesthesia Type:  general, MAC  Patient's Preference:   Intra-op Monitoring Plan: standard ASA monitors  Intra-op Monitoring Plan Comments:   Post Op Pain Control Plan: multimodal analgesia  Post Op Pain Control Plan Comments:   Induction:   IV  Beta Blocker:  Patient is on a Beta-Blocker and has received one dose within the past 24 hours (No further documentation required).       Informed Consent: Patient understands risks and agrees with Anesthesia plan.  Questions answered. Anesthesia consent signed with patient.  ASA Score: 2     Day of Surgery Review of History & Physical: I have interviewed and examined the patient. I have reviewed the patient's H&P  dated:    H&P update referred to the surgeon.

## 2020-01-09 NOTE — PLAN OF CARE
Received patient from PACU, tolerating well. Patient has weakness to left hand and extremity. C collar in place. Hemovac in place. Patient due to void. Will continue to monitor. 12 hour chart check.

## 2020-01-10 ENCOUNTER — TELEPHONE (OUTPATIENT)
Dept: NEUROSURGERY | Facility: CLINIC | Age: 50
End: 2020-01-10

## 2020-01-10 VITALS
DIASTOLIC BLOOD PRESSURE: 72 MMHG | HEIGHT: 73 IN | BODY MASS INDEX: 30.21 KG/M2 | SYSTOLIC BLOOD PRESSURE: 126 MMHG | OXYGEN SATURATION: 98 % | WEIGHT: 227.94 LBS | TEMPERATURE: 98 F | RESPIRATION RATE: 16 BRPM | HEART RATE: 87 BPM

## 2020-01-10 LAB
ANION GAP SERPL CALC-SCNC: 15 MMOL/L (ref 8–16)
BASOPHILS # BLD AUTO: 0.04 K/UL (ref 0–0.2)
BASOPHILS NFR BLD: 0.3 % (ref 0–1.9)
BUN SERPL-MCNC: 15 MG/DL (ref 6–20)
CALCIUM SERPL-MCNC: 10.1 MG/DL (ref 8.7–10.5)
CHLORIDE SERPL-SCNC: 99 MMOL/L (ref 95–110)
CO2 SERPL-SCNC: 23 MMOL/L (ref 23–29)
CREAT SERPL-MCNC: 1 MG/DL (ref 0.5–1.4)
DIFFERENTIAL METHOD: ABNORMAL
EOSINOPHIL # BLD AUTO: 0 K/UL (ref 0–0.5)
EOSINOPHIL NFR BLD: 0.2 % (ref 0–8)
ERYTHROCYTE [DISTWIDTH] IN BLOOD BY AUTOMATED COUNT: 12.5 % (ref 11.5–14.5)
EST. GFR  (AFRICAN AMERICAN): >60 ML/MIN/1.73 M^2
EST. GFR  (NON AFRICAN AMERICAN): >60 ML/MIN/1.73 M^2
GLUCOSE SERPL-MCNC: 126 MG/DL (ref 70–110)
HCT VFR BLD AUTO: 44.6 % (ref 40–54)
HGB BLD-MCNC: 14.2 G/DL (ref 14–18)
IMM GRANULOCYTES # BLD AUTO: 0.04 K/UL (ref 0–0.04)
IMM GRANULOCYTES NFR BLD AUTO: 0.3 % (ref 0–0.5)
LYMPHOCYTES # BLD AUTO: 1.2 K/UL (ref 1–4.8)
LYMPHOCYTES NFR BLD: 10.6 % (ref 18–48)
MCH RBC QN AUTO: 27.6 PG (ref 27–31)
MCHC RBC AUTO-ENTMCNC: 31.8 G/DL (ref 32–36)
MCV RBC AUTO: 87 FL (ref 82–98)
MONOCYTES # BLD AUTO: 1 K/UL (ref 0.3–1)
MONOCYTES NFR BLD: 8.3 % (ref 4–15)
NEUTROPHILS # BLD AUTO: 9.3 K/UL (ref 1.8–7.7)
NEUTROPHILS NFR BLD: 80.3 % (ref 38–73)
NRBC BLD-RTO: 0 /100 WBC
PLATELET # BLD AUTO: 260 K/UL (ref 150–350)
PMV BLD AUTO: 9.1 FL (ref 9.2–12.9)
POTASSIUM SERPL-SCNC: 4.6 MMOL/L (ref 3.5–5.1)
RBC # BLD AUTO: 5.15 M/UL (ref 4.6–6.2)
SODIUM SERPL-SCNC: 137 MMOL/L (ref 136–145)
WBC # BLD AUTO: 11.64 K/UL (ref 3.9–12.7)

## 2020-01-10 PROCEDURE — 63600175 PHARM REV CODE 636 W HCPCS: Performed by: PHYSICIAN ASSISTANT

## 2020-01-10 PROCEDURE — 85025 COMPLETE CBC W/AUTO DIFF WBC: CPT

## 2020-01-10 PROCEDURE — 25000003 PHARM REV CODE 250: Performed by: PHYSICIAN ASSISTANT

## 2020-01-10 PROCEDURE — 80048 BASIC METABOLIC PNL TOTAL CA: CPT

## 2020-01-10 PROCEDURE — 36415 COLL VENOUS BLD VENIPUNCTURE: CPT

## 2020-01-10 RX ORDER — CYCLOBENZAPRINE HCL 10 MG
10 TABLET ORAL 3 TIMES DAILY PRN
Qty: 60 TABLET | Refills: 0 | Status: SHIPPED | OUTPATIENT
Start: 2020-01-10 | End: 2020-01-22

## 2020-01-10 RX ORDER — HYDROCODONE BITARTRATE AND ACETAMINOPHEN 10; 325 MG/1; MG/1
1 TABLET ORAL EVERY 4 HOURS PRN
Qty: 30 TABLET | Refills: 0 | Status: SHIPPED | OUTPATIENT
Start: 2020-01-10 | End: 2020-01-25

## 2020-01-10 RX ORDER — METHOCARBAMOL 750 MG/1
750 TABLET, FILM COATED ORAL 3 TIMES DAILY PRN
Qty: 60 TABLET | Refills: 0 | Status: SHIPPED | OUTPATIENT
Start: 2020-01-10 | End: 2020-01-10 | Stop reason: HOSPADM

## 2020-01-10 RX ADMIN — OXYCODONE HYDROCHLORIDE AND ACETAMINOPHEN 1 TABLET: 5; 325 TABLET ORAL at 04:01

## 2020-01-10 RX ADMIN — LORAZEPAM 0.5 MG: 0.5 TABLET ORAL at 02:01

## 2020-01-10 RX ADMIN — BUSPIRONE HYDROCHLORIDE 5 MG: 5 TABLET ORAL at 09:01

## 2020-01-10 RX ADMIN — LOSARTAN POTASSIUM 100 MG: 50 TABLET, FILM COATED ORAL at 09:01

## 2020-01-10 RX ADMIN — OXYCODONE HYDROCHLORIDE AND ACETAMINOPHEN 1 TABLET: 5; 325 TABLET ORAL at 10:01

## 2020-01-10 RX ADMIN — HYDROCHLOROTHIAZIDE 12.5 MG: 12.5 TABLET ORAL at 09:01

## 2020-01-10 RX ADMIN — VANCOMYCIN HYDROCHLORIDE 1500 MG: 100 INJECTION, POWDER, LYOPHILIZED, FOR SOLUTION INTRAVENOUS at 09:01

## 2020-01-10 NOTE — PROGRESS NOTES
[facility]  Neurosurgery  Progress Note    Subjective:     History of Present Illness: See prior notes.     POD #1  Patient is doing well this morning.  Tingling/numbness of upper ext has resolved  Denies arm pain  C/o pain with swallowing which is expected given his surgery  Hemovac drain compressed but no drainage  Aspen collar in place  Pain is controlled    Post-Op Info:  Procedure(s) (LRB):  DISCECTOMY, SPINE, CERVICAL, ANTERIOR APPROACH, WITH FUSION   (Bilateral)  CORPECTOMY, SPINE, CERVICAL   1 Day Post-Op      Medications:  Continuous Infusions:  Scheduled Meds:   atorvastatin  20 mg Oral QHS    busPIRone  5 mg Oral TID    hydroCHLOROthiazide  12.5 mg Oral Daily    losartan  100 mg Oral Daily    metoprolol succinate  37.5 mg Oral Nightly    vancomycin (VANCOCIN) IVPB  1,500 mg Intravenous Q12H     PRN Meds:acetaminophen, LORazepam, methocarbamol, ondansetron, oxyCODONE-acetaminophen, oxyCODONE-acetaminophen     Review of Systems  Objective:     Weight: 103.4 kg (227 lb 15.3 oz)  Body mass index is 30.08 kg/m².  Vital Signs (Most Recent):  Temp: 97.9 °F (36.6 °C) (01/10/20 0711)  Pulse: 87 (01/10/20 0711)  Resp: 16 (01/10/20 0711)  BP: 126/72 (01/10/20 0711)  SpO2: 98 % (01/10/20 0711) Vital Signs (24h Range):  Temp:  [97.8 °F (36.6 °C)-98.9 °F (37.2 °C)] 97.9 °F (36.6 °C)  Pulse:  [] 87  Resp:  [13-20] 16  SpO2:  [93 %-100 %] 98 %  BP: (117-137)/(59-82) 126/72     Date 01/10/20 0700 - 01/11/20 0659   Shift 5635-3147 8138-1406 9265-3994 24 Hour Total   INTAKE   Shift Total(mL/kg)       OUTPUT   Drains 0   0   Shift Total(mL/kg) 0(0)   0(0)   Weight (kg) 103.4 103.4 103.4 103.4              Oxygen Concentration (%):  [98] 98         Closed/Suction Drain 01/09/20 1144 Neck Accordion 7 Fr. (Active)   Site Description Unable to view 1/10/2020  7:11 AM   Dressing Type Transparent 1/10/2020  7:11 AM   Dressing Status Clean;Dry;Intact 1/10/2020  7:11 AM   Drainage Sanguineous 1/9/2020  12:15 PM   Status To bulb suction 1/10/2020  7:11 AM   Output (mL) 0 mL 1/10/2020  7:11 AM       Neurosurgery Physical Exam    Trachea midline  Neck supple  Incision CDI  Moves all four extremities well  Sensation intact  Tolerating soft diet/liquids  Pain controlled  Collar in place      Significant Labs:  Recent Labs   Lab 01/10/20  0450   *      K 4.6   CL 99   CO2 23   BUN 15   CREATININE 1.0   CALCIUM 10.1     Recent Labs   Lab 01/10/20  0450   WBC 11.64   HGB 14.2   HCT 44.6        No results for input(s): LABPT, INR, APTT in the last 48 hours.  Microbiology Results (last 7 days)     ** No results found for the last 168 hours. **        All pertinent labs from the last 24 hours have been reviewed.  Significant Diagnostics:  I have reviewed all pertinent imaging results/findings within the past 24 hours.  X-rays show good alignment of cervical spine and evidence of hardware. No acute changes compared to images on 1/9/20.     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

## 2020-01-10 NOTE — DISCHARGE SUMMARY
[facility]   Neurosurgery  Discharge Summary      Patient Name: Sae Santiago  MRN: [id number]  Admission Date: 1/9/2020  Hospital Length of Stay: 0 days  Discharge Date and Time: 1/10/2020 11:30 AM  Attending Physician: No att. providers found   Discharging Provider: Rocio Helm PA-C  Primary Care Provider: Floyd Matthews MD     HPI: See prior notes.     Procedure(s) (LRB):  DISCECTOMY, SPINE, CERVICAL, ANTERIOR APPROACH, WITH FUSION   (Bilateral)  CORPECTOMY, SPINE, CERVICAL     Hospital Course: The patient was scheduled for ACDF versus corpectomy of cervical spine. He underwent a Corpectomy at C6. No complications. He was admitted for postop care/ pain control. Patient was discharged on postop day #2.     Consults:  None    Significant Diagnostic Studies: Radiology: X-Ray:  Narrative     EXAMINATION:  XR CERVICAL SPINE AP LATERAL    CLINICAL HISTORY:  Other cervical disc degeneration, unspecified cervical regions/p corpectomy at C6;    COMPARISON:  Preoperative exam 11/30/2019    FINDINGS:  Patient is status post C6 corpectomy with anterior fusion plate extending from C5-C7.  Hardware appears in good position.       Pending Diagnostic Studies:     Procedure Component Value Units Date/Time    Specimen to Pathology, Surgery Neurosurgery [[id number]] Collected:  01/09/20 1105    Order Status:  Sent Lab Status:  In process Updated:  01/09/20 1414        Final Active Diagnoses:    Diagnosis Date Noted POA    PRINCIPAL PROBLEM:  DDD (degenerative disc disease), cervical [M50.30] 07/03/2017 Yes     Chronic    Myelopathy concurrent with and due to spinal stenosis of cervical region [M48.02, G99.2] 01/09/2020 Yes     Chronic    Neck pain [M54.2] 02/23/2017 Yes      Problems Resolved During this Admission:      Discharged Condition: good    Disposition: Home or Self Care    Follow Up:  Follow-up Information     Rocio Helm PA-C. Schedule an appointment as soon as possible for a visit  in 2 weeks.    Specialty:  Neurosurgery  Why:  For wound re-check  Contact information:  [address] [place] [address] [address] [place] [address] [phone]                 Patient Instructions:      Diet general     Call MD for:  extreme fatigue     Call MD for:  persistent dizziness or light-headedness     Call MD for:  hives     Call MD for:  temperature >100.4     Call MD for:  persistent nausea and vomiting     Call MD for:  severe uncontrolled pain     Call MD for:  difficulty breathing, headache or visual disturbances     Call MD for:  redness, tenderness, or signs of infection (pain, swelling, redness, odor or green/yellow discharge around incision site)     No dressing needed     Medications:  Reconciled Home Medications:      Medication List      START taking these medications    HYDROcodone-acetaminophen  mg per tablet  Commonly known as:  NORCO  Take 1 tablet by mouth every 4 (four) hours as needed for Pain.        CHANGE how you take these medications    atorvastatin 40 MG tablet  Commonly known as:  LIPITOR  TAKE 1 TABLET BY MOUTH ONCE DAILY  What changed:    · how much to take  · when to take this     busPIRone 10 MG tablet  Commonly known as:  BUSPAR  Take 1 tablet (10 mg total) by mouth 3 (three) times daily.  What changed:  how much to take     cyclobenzaprine 10 MG tablet  Commonly known as:  FLEXERIL  Take 1 tablet (10 mg total) by mouth 3 (three) times daily as needed for Muscle spasms.  What changed:  when to take this     metoprolol succinate 25 MG 24 hr tablet  Commonly known as:  TOPROL-XL  Take 1 1/2 tablet a night  What changed:    · how much to take  · how to take this  · when to take this        CONTINUE taking these medications    acetaminophen 500 MG tablet  Commonly known as:  TYLENOL  Take 1,000 mg by mouth every 6 (six) hours as needed for Pain.     cetirizine 10 MG tablet  Commonly known as:  ZYRTEC  Take 10 mg by mouth once daily.     hydroCHLOROthiazide 12.5 mg capsule  Commonly  known as:  MICROZIDE  TAKE 1 CAPSULE BY MOUTH ONCE DAILY     LORazepam 0.5 MG tablet  Commonly known as:  ATIVAN  Take 1 tablet (0.5 mg total) by mouth as needed.     losartan 100 MG tablet  Commonly known as:  COZAAR  Take 1 tablet (100 mg total) by mouth once daily.     multivitamin per tablet  Commonly known as:  THERAGRAN  Take 1 tablet by mouth once daily.        STOP taking these medications    amitriptyline 25 MG tablet  Commonly known as:  ELAVIL     ammonium lactate 12 % lotion  Commonly known as:  AmLactin     fish oil-omega-3 fatty acids 300-1,000 mg capsule     gabapentin 100 MG capsule  Commonly known as:  NEURONTIN     GLUCOS CHOND CPLX ADVANCED ORAL     methylPREDNISolone 4 mg tablet  Commonly known as:  MEDROL DOSEPACK     traMADol 50 mg tablet  Commonly known as:  TRUMAN Helm PA-C  Neurosurgery  [facility]

## 2020-01-10 NOTE — NURSING
Messaged [facility] staff pool to reach out to patient for 2 week post operative appointment.

## 2020-01-10 NOTE — DISCHARGE INSTRUCTIONS
No NSAIDS (Aleve, Motrin, etc. ) for 2 months.     Avoid blood thinners (Aspirin) for next 4 days.     No lifting anything greater than 5-10 lbs.     Wear cervical collar at all times. Okay to adjust using yellow button (on front of collar).     Patient can walk daily.     No dressing needed on main incision. Dermabond in place. Glue will gradually flake off. Okay to wash with gentle soap and water in 2 days.  Drain site- leave steri-strips in place.     Eat soft foods or liquid diet for next few days. Gradually transition to regular diet once swallowing is better tolerated.

## 2020-01-10 NOTE — PLAN OF CARE
IV d/c'd, tip intact. Discahrge instructions reviewed. Patient verbalized understanding. All questions answered. Medication at bedside. Ok to wheel out when ready.

## 2020-01-10 NOTE — PLAN OF CARE
Remains free from injury. States relief of pain with available prn meds. Abx running as ordered. Dressing CDI. Hemovac compressed. Vital signs stable. Chart reviewed. Will continue to monitor.

## 2020-01-10 NOTE — TELEPHONE ENCOUNTER
Phoned pt in regards to getting him scheduled for a post op 1 appt.    Pt is scheduled per angelina on 1/22/2020 at 7:30 AM .    Pt verbalized understanding     ----- Message from Laura Lee LPN sent at 1/10/2020 10:49 AM CST -----  Please call patient with appointment. Patient needs to be seen in 2 weeks.

## 2020-01-13 LAB
FINAL PATHOLOGIC DIAGNOSIS: NORMAL
GROSS: NORMAL

## 2020-01-15 ENCOUNTER — TELEPHONE (OUTPATIENT)
Dept: PAIN MEDICINE | Facility: CLINIC | Age: 50
End: 2020-01-15

## 2020-01-15 ENCOUNTER — PATIENT MESSAGE (OUTPATIENT)
Dept: NEUROSURGERY | Facility: CLINIC | Age: 50
End: 2020-01-15

## 2020-01-15 NOTE — TELEPHONE ENCOUNTER
----- Message from Peter Martinez sent at 1/15/2020 10:20 AM CST -----  Contact: pt   Type:  Needs Medical Advice    Who Called:  Pt   Symptoms (please be specific): crackling, wheezing ( chest, lungs) no SOB   How long has patient had these symptoms:  Pt noticed today   Pharmacy name and phone #:     Would the patient rather a call back or a response via [facility]? phone  Best Call Back Number:  [phone]  Additional Information: pt had surgery 6 days ago

## 2020-01-15 NOTE — TELEPHONE ENCOUNTER
Patient was called by our MA (Kymberly). She addressed his concerns. He is not having any issues related to his surgery. Patient is concerned about URI vs pneumonia. We recommended contacting PCP for appt and possible CXR. Patient agreed to this plan.

## 2020-01-22 ENCOUNTER — OFFICE VISIT (OUTPATIENT)
Dept: NEUROSURGERY | Facility: CLINIC | Age: 50
End: 2020-01-22
Payer: COMMERCIAL

## 2020-01-22 VITALS
DIASTOLIC BLOOD PRESSURE: 85 MMHG | WEIGHT: 226.88 LBS | BODY MASS INDEX: 30.07 KG/M2 | HEART RATE: 73 BPM | HEIGHT: 73 IN | SYSTOLIC BLOOD PRESSURE: 148 MMHG

## 2020-01-22 DIAGNOSIS — Z09 STATUS POST NECK SURGERY, FOLLOW-UP EXAM: ICD-10-CM

## 2020-01-22 DIAGNOSIS — Z98.1 S/P CERVICAL SPINAL FUSION: ICD-10-CM

## 2020-01-22 DIAGNOSIS — Z09 POSTOP CHECK: Primary | ICD-10-CM

## 2020-01-22 PROCEDURE — 99024 POSTOP FOLLOW-UP VISIT: CPT | Mod: S$GLB,,, | Performed by: PHYSICIAN ASSISTANT

## 2020-01-22 PROCEDURE — 99024 PR POST-OP FOLLOW-UP VISIT: ICD-10-PCS | Mod: S$GLB,,, | Performed by: PHYSICIAN ASSISTANT

## 2020-01-22 PROCEDURE — 99999 PR PBB SHADOW E&M-EST. PATIENT-LVL IV: ICD-10-PCS | Mod: PBBFAC,,, | Performed by: PHYSICIAN ASSISTANT

## 2020-01-22 PROCEDURE — 99999 PR PBB SHADOW E&M-EST. PATIENT-LVL IV: CPT | Mod: PBBFAC,,, | Performed by: PHYSICIAN ASSISTANT

## 2020-01-22 RX ORDER — TIZANIDINE 4 MG/1
4 TABLET ORAL EVERY 6 HOURS PRN
Qty: 60 TABLET | Refills: 0 | Status: SHIPPED | OUTPATIENT
Start: 2020-01-22 | End: 2020-02-05

## 2020-01-22 NOTE — PROGRESS NOTES
Subjective:      Patient ID: Sae Santiago is a 49 y.o. male.    Chief Complaint: Cervical Spine Pain (C-spine); Follow-up; and Post-op Evaluation    HPI   The patient is here today for postop evaluation #1. He reports that his LUE is 100% however he is having some discomfort with R forearm- mainly around lateral epicondyle radiating down to his wrist. He states his dexterity is not as good in this hand too.He has no significant issues with swallowing. He is wearing his collar as instructed.     Surgical Details-  Date of Procedure: 1/9/2020    Procedure: Procedure(s) (LRB):   C6 corpectomy     Surgeon(s) and Role:     * Ari Grossman MD - Primary   Assistant: Rocio Helm PA-C   Pre-Operative Diagnosis: Degenerative disc disease, cervical [M50.30]  Cervical radiculopathy [M54.12]  Myelopathy concurrent with and due to spinal stenosis of cervical region [M48.02, G99.2]  Abnormal x-ray of cervical spine [R93.7]   Post-Operative Diagnosis: Post-Op Diagnosis Codes:     * Degenerative disc disease, cervical [M50.30]     * Cervical radiculopathy [M54.12]     * Myelopathy concurrent with and due to spinal stenosis of cervical region [M48.02, G99.2]     * Abnormal x-ray of cervical spine [R93.7]   Anesthesia: General   Technical Procedures Used:   Large herniated disc with superior extension C6-7 behind the vertebral body of C6       Review of Systems   Constitution: Negative for fever.   HENT: Negative for congestion.    Respiratory: Negative for cough and wheezing.    Skin: Negative for poor wound healing.   Musculoskeletal: Positive for myalgias and neck pain. Negative for back pain, falls, joint pain and muscle weakness.   Gastrointestinal: Negative for abdominal pain, bowel incontinence, diarrhea, nausea and vomiting.   Genitourinary: Negative for bladder incontinence.   Neurological: Negative for numbness and seizures.   Psychiatric/Behavioral: Negative for altered mental status.         Objective:             General    Constitutional: He is oriented to person, place, and time. He appears well-developed and well-nourished.   Neck: Normal range of motion.   Cardiovascular: Normal rate and regular rhythm.    Pulmonary/Chest: Effort normal.   Abdominal: Soft.   Neurological: He is alert and oriented to person, place, and time.   Psychiatric: He has a normal mood and affect. His behavior is normal.           Neurosurgery Exam-    Trachea appears midline  Neck supple  Moves all 4 extremities  Sensation intact to light touch- jasmeet upper extremities  Cervical collar in place  Incision CDI, drain site intact  No erythema, swelling or fluctuance                    Assessment:       Encounter Diagnoses   Name Primary?    Postop check Yes    Status post neck surgery, follow-up exam     S/P cervical spinal fusion           Plan:       Sae was seen today for cervical spine pain (c-spine), follow-up and post-op evaluation.    Diagnoses and all orders for this visit:    Postop check  -     X-Ray Cervical Spine AP And Lateral; Future  -     Ambulatory Referral to Physical/Occupational Therapy    Status post neck surgery, follow-up exam    S/P cervical spinal fusion    Other orders  -     tiZANidine (ZANAFLEX) 4 MG tablet; Take 1 tablet (4 mg total) by mouth every 6 (six) hours as needed.        Patient will be referred to out PT/OT department for postop core strengthening, ROM exercises.  He will continue to wear collar as instructed.  Rx sent for Tizanidine.  Patient will follow-up in 4 weeks with MD, x-rays scheduled prior to appt.          Rocio Helm PA-C

## 2020-01-27 ENCOUNTER — PATIENT MESSAGE (OUTPATIENT)
Dept: NEUROSURGERY | Facility: CLINIC | Age: 50
End: 2020-01-27

## 2020-01-27 ENCOUNTER — TELEPHONE (OUTPATIENT)
Dept: NEUROSURGERY | Facility: CLINIC | Age: 50
End: 2020-01-27

## 2020-01-27 DIAGNOSIS — M25.521 RIGHT ELBOW PAIN: Primary | ICD-10-CM

## 2020-01-27 NOTE — TELEPHONE ENCOUNTER
Phoned pt in regards to getting scheduled for Right elbow pain in Orthopedics.    Pt is scheduled to see Dr. Philip at 11 AM on 1/29/2020.    Pt verbalized understanding

## 2020-01-29 ENCOUNTER — OFFICE VISIT (OUTPATIENT)
Dept: ORTHOPEDICS | Facility: CLINIC | Age: 50
End: 2020-01-29
Payer: COMMERCIAL

## 2020-01-29 ENCOUNTER — HOSPITAL ENCOUNTER (OUTPATIENT)
Dept: RADIOLOGY | Facility: HOSPITAL | Age: 50
Discharge: HOME | End: 2020-01-29
Attending: ORTHOPAEDIC SURGERY
Payer: COMMERCIAL

## 2020-01-29 ENCOUNTER — CLINICAL SUPPORT (OUTPATIENT)
Dept: REHABILITATION | Facility: HOSPITAL | Age: 50
End: 2020-01-29
Payer: COMMERCIAL

## 2020-01-29 VITALS
HEIGHT: 73 IN | HEART RATE: 83 BPM | SYSTOLIC BLOOD PRESSURE: 139 MMHG | BODY MASS INDEX: 29.95 KG/M2 | WEIGHT: 226 LBS | DIASTOLIC BLOOD PRESSURE: 80 MMHG

## 2020-01-29 DIAGNOSIS — M77.12 LATERAL EPICONDYLITIS OF BOTH ELBOWS: Primary | ICD-10-CM

## 2020-01-29 DIAGNOSIS — M62.81 MUSCLE WEAKNESS: ICD-10-CM

## 2020-01-29 DIAGNOSIS — M25.521 RIGHT ELBOW PAIN: ICD-10-CM

## 2020-01-29 DIAGNOSIS — M77.11 LATERAL EPICONDYLITIS OF BOTH ELBOWS: Primary | ICD-10-CM

## 2020-01-29 DIAGNOSIS — M50.30 DDD (DEGENERATIVE DISC DISEASE), CERVICAL: Primary | Chronic | ICD-10-CM

## 2020-01-29 PROCEDURE — 3079F PR MOST RECENT DIASTOLIC BLOOD PRESSURE 80-89 MM HG: ICD-10-PCS | Mod: CPTII,S$GLB,, | Performed by: ORTHOPAEDIC SURGERY

## 2020-01-29 PROCEDURE — 97535 SELF CARE MNGMENT TRAINING: CPT | Performed by: PHYSICAL THERAPIST

## 2020-01-29 PROCEDURE — 73080 X-RAY EXAM OF ELBOW: CPT | Mod: TC,RT

## 2020-01-29 PROCEDURE — 3079F DIAST BP 80-89 MM HG: CPT | Mod: CPTII,S$GLB,, | Performed by: ORTHOPAEDIC SURGERY

## 2020-01-29 PROCEDURE — 99204 PR OFFICE/OUTPT VISIT, NEW, LEVL IV, 45-59 MIN: ICD-10-PCS | Mod: S$GLB,,, | Performed by: ORTHOPAEDIC SURGERY

## 2020-01-29 PROCEDURE — 3008F BODY MASS INDEX DOCD: CPT | Mod: CPTII,S$GLB,, | Performed by: ORTHOPAEDIC SURGERY

## 2020-01-29 PROCEDURE — 3075F SYST BP GE 130 - 139MM HG: CPT | Mod: CPTII,S$GLB,, | Performed by: ORTHOPAEDIC SURGERY

## 2020-01-29 PROCEDURE — 73080 X-RAY EXAM OF ELBOW: CPT | Mod: 26,RT,, | Performed by: RADIOLOGY

## 2020-01-29 PROCEDURE — 73080 XR ELBOW COMPLETE 3 VIEW RIGHT: ICD-10-PCS | Mod: 26,RT,, | Performed by: RADIOLOGY

## 2020-01-29 PROCEDURE — 99999 PR PBB SHADOW E&M-EST. PATIENT-LVL III: CPT | Mod: PBBFAC,,, | Performed by: ORTHOPAEDIC SURGERY

## 2020-01-29 PROCEDURE — 3075F PR MOST RECENT SYSTOLIC BLOOD PRESS GE 130-139MM HG: ICD-10-PCS | Mod: CPTII,S$GLB,, | Performed by: ORTHOPAEDIC SURGERY

## 2020-01-29 PROCEDURE — 97161 PT EVAL LOW COMPLEX 20 MIN: CPT | Performed by: PHYSICAL THERAPIST

## 2020-01-29 PROCEDURE — 3008F PR BODY MASS INDEX (BMI) DOCUMENTED: ICD-10-PCS | Mod: CPTII,S$GLB,, | Performed by: ORTHOPAEDIC SURGERY

## 2020-01-29 PROCEDURE — 99999 PR PBB SHADOW E&M-EST. PATIENT-LVL III: ICD-10-PCS | Mod: PBBFAC,,, | Performed by: ORTHOPAEDIC SURGERY

## 2020-01-29 PROCEDURE — 99204 OFFICE O/P NEW MOD 45 MIN: CPT | Mod: S$GLB,,, | Performed by: ORTHOPAEDIC SURGERY

## 2020-01-29 RX ORDER — DICLOFENAC SODIUM 10 MG/G
2 GEL TOPICAL 4 TIMES DAILY
Qty: 1 TUBE | Refills: 0 | Status: SHIPPED | OUTPATIENT
Start: 2020-01-29 | End: 2023-06-03 | Stop reason: ALTCHOICE

## 2020-01-29 NOTE — PROGRESS NOTES
Subjective:     Patient ID: Sae Santiago is a 49 y.o. male.    Chief Complaint: No chief complaint on file.    01/29/2020-    Sae Santiago, a 49 y.o. MALE, is coming in for RIGHT elbow pain. Patient states that the pain has been going on for five months. Patient states there is no mode of injury in regards to the elbow.      Recent neurosurgical procedure for large disc herniation in which she had a corpectomy.    Elbow Pain    The pain is present in the right elbow. This is a chronic problem. The current episode started more than 1 month ago. The problem occurs intermittently and constantly. The problem has been unchanged. The quality of the pain is described as tight, aching, sharp and burning. The pain is at a severity of 2/10. Associated symptoms include stiffness. Pertinent negatives include no fever or numbness. The symptoms are aggravated by activity, twisting, bending and rotation. He has tried heat, cold and OTC pain meds for the symptoms. The treatment provided no relief. Physical therapy was not tried.      Past Medical History:   Diagnosis Date    Anxiety     GERD (gastroesophageal reflux disease)     Hyperlipidemia     Hypertension      Past Surgical History:   Procedure Laterality Date    ANTERIOR CERVICAL DISCECTOMY W/ FUSION Bilateral 1/9/2020    Procedure: DISCECTOMY, SPINE, CERVICAL, ANTERIOR APPROACH, WITH FUSION  ;  Surgeon: Ari Grossman MD;  Location: [facility] OR;  Service: Neurosurgery;  Laterality: Bilateral;    EPIDURAL STEROID INJECTION INTO CERVICAL SPINE N/A 12/13/2019    Procedure: C7/T1 IL MOE;  Surgeon: Parveen Goldstein MD;  Location: [facility];  Service: Pain Management;  Laterality: N/A;    KNEE SURGERY Left     SURGICAL REMOVAL OF PILONIDAL CYST      SURGICAL REMOVAL OF VERTEBRAL BODY OF CERVICAL SPINE  1/9/2020    Procedure: CORPECTOMY, SPINE, CERVICAL;  Surgeon: Ari Grossman MD;  Location: [facility] OR;  Service: Neurosurgery;;    TONSILLECTOMY        Family History   Problem Relation Age of Onset    Hypertension Father     Melanoma Neg Hx     Psoriasis Neg Hx     Lupus Neg Hx     Eczema Neg Hx     Acne Neg Hx      Social History     Socioeconomic History    Marital status:      Spouse name: Not on file    Number of children: Not on file    Years of education: Not on file    Highest education level: Not on file   Occupational History     Employer: [employer]   Social Needs    Financial resource strain: Not on file    Food insecurity:     Worry: Not on file     Inability: Not on file    Transportation needs:     Medical: Not on file     Non-medical: Not on file   Tobacco Use    Smoking status: Never Smoker    Smokeless tobacco: Never Used   Substance and Sexual Activity    Alcohol use: No    Drug use: No    Sexual activity: Yes     Partners: Female   Lifestyle    Physical activity:     Days per week: Not on file     Minutes per session: Not on file    Stress: Only a little   Relationships    Social connections:     Talks on phone: Not on file     Gets together: Not on file     Attends Zoroastrianism service: Not on file     Active member of club or organization: Not on file     Attends meetings of clubs or organizations: Not on file     Relationship status: Not on file   Other Topics Concern    Not on file   Social History Narrative    Live with spouse      Medication List with Changes/Refills   New Medications    DICLOFENAC SODIUM (VOLTAREN) 1 % GEL    Apply 2 g topically 4 (four) times daily.   Current Medications    ACETAMINOPHEN (TYLENOL) 500 MG TABLET    Take 1,000 mg by mouth every 6 (six) hours as needed for Pain.    ATORVASTATIN (LIPITOR) 40 MG TABLET    TAKE 1 TABLET BY MOUTH ONCE DAILY    BUSPIRONE (BUSPAR) 10 MG TABLET    Take 1 tablet (10 mg total) by mouth 3 (three) times daily.    CETIRIZINE (ZYRTEC) 10 MG TABLET    Take 10 mg by mouth once daily.    HYDROCHLOROTHIAZIDE (MICROZIDE) 12.5 MG CAPSULE    TAKE 1 CAPSULE BY MOUTH ONCE  DAILY    LORAZEPAM (ATIVAN) 0.5 MG TABLET    Take 1 tablet (0.5 mg total) by mouth as needed.    LOSARTAN (COZAAR) 100 MG TABLET    Take 1 tablet (100 mg total) by mouth once daily.    METOPROLOL SUCCINATE (TOPROL-XL) 25 MG 24 HR TABLET    Take 1 1/2 tablet a night    MULTIVITAMIN (THERAGRAN) PER TABLET    Take 1 tablet by mouth once daily.    TIZANIDINE (ZANAFLEX) 4 MG TABLET    Take 1 tablet (4 mg total) by mouth every 6 (six) hours as needed.     Review of patient's allergies indicates:   Allergen Reactions    Lexapro [escitalopram oxalate]      Serotonin Syndrome ( Pt was seen in the emergency department)    Sulfa (sulfonamide antibiotics)      Room spinning with cold sweats     Review of Systems   Constitution: Negative for chills and fever.   HENT: Negative for ear discharge and hearing loss.    Eyes: Negative for blurred vision and visual disturbance.   Cardiovascular: Negative for chest pain and leg swelling.   Respiratory: Negative for cough and shortness of breath.    Endocrine: Negative for polyuria.   Hematologic/Lymphatic: Negative for bleeding problem.   Skin: Negative for rash.   Musculoskeletal: Positive for joint pain, neck pain and stiffness. Negative for back pain, joint swelling, muscle cramps and muscle weakness.   Gastrointestinal: Negative for nausea and vomiting.   Genitourinary: Negative for hematuria.   Neurological: Negative for loss of balance, numbness and paresthesias.   Psychiatric/Behavioral: Negative for altered mental status.       Objective:   Body mass index is 29.82 kg/m².  Vitals:    01/29/20 1115   BP: 139/80   Pulse: 83                General    Vitals reviewed.  Constitutional: He is oriented to person, place, and time. He appears well-developed and well-nourished. No distress.   HENT:   Right Ear: External ear normal.   Left Ear: External ear normal.   Nose: Nose normal.   Eyes: EOM are normal. Right eye exhibits no discharge. Left eye exhibits no discharge.    Pulmonary/Chest: Effort normal. No respiratory distress.   Abdominal: Soft.   Neurological: He is alert and oriented to person, place, and time. He exhibits normal muscle tone. Coordination normal.   Psychiatric: He has a normal mood and affect. His behavior is normal. Judgment and thought content normal.             Right Hand/Wrist Exam     Inspection   Scars: Wrist - absent   Effusion: Wrist - absent   Bruising: Wrist - absent   Deformity: Wrist - deformity     Range of Motion     Wrist   Extension: 50   Flexion: 70 Abduction: 20 Adduction: 35     Tests   Phalens Sign: negative  Tinel's sign (median nerve): negative  Finkelstein's test: negative  Carpal Tunnel Compression Test: negative  Cubital Tunnel Compression Test: negative      Other     Neuorologic Exam    Median Distribution: normal  Ulnar Distribution: normal  Radial Distribution: normal      Left Hand/Wrist Exam     Inspection   Scars: Wrist - absent   Effusion: Wrist - absent   Bruising: Wrist - absent   Deformity: Wrist - absent     Range of Motion     Wrist   Extension: 50   Flexion: 70 Abduction: 20 Adduction: 35     Tests   Phalens Sign: negative  Tinel's sign (median nerve): negative  Finkelstein's test: negative  Carpal Tunnel Compression Test: negative  Cubital Tunnel Compression Test: negative      Other     Sensory Exam  Median Distribution: normal  Ulnar Distribution: normal  Radial Distribution: normal      Right Elbow Exam     Inspection   Scars: absent  Effusion: absent  Bruising: absent  Deformity: absent  Atrophy: absent    Range of Motion   Extension: 0   Flexion: 130   Supination: 80     Tests   Varus: negative  Valgus: negative  Tinel's sign (cubital tunnel): negative  Tennis Elbow: moderate  Golfer's Elbow: negative  Radial Capitellar Grind: negative    Other   Sensation: normal    Comments:  Painful resisted finger extension at lateral epicondyle      Left Elbow Exam     Inspection   Scars: absent  Effusion: absent  Bruising:  absent  Deformity: absent  Atrophy: absent    Range of Motion   Extension: 0   Flexion: 130   Supination: 80     Tests   Varus: negative  Tinel's sign (cubital tunnel): negative  Tennis Elbow: mild  Golfer's Elbow: negative  Radial Capitellar Grind: negative    Other   Sensation: normal    Comments:  Painful resisted finger extension at lateral epicondyle        Muscle Strength   Right Upper Extremity   Wrist extension: 5/5/5   Wrist flexion: 5/5/5   : 5/5/5   Pinch Mechanism: 5/5  Elbow Pronation:  5/5   Elbow Supination:  5/5   Elbow Extension: 5/5  Elbow Flexion: 5/5  Intrinsics: 5/5  EPL (Extensor Pollicis Longus): 5/5  Left Upper Extremity  Wrist extension: 4/5/5   Wrist flexion: 4/5/5   :  4/5/5   Pinch Mechanism: 4/5  Elbow Pronation:  4/5   Elbow Supination:  4/5   Elbow Extension: 4/5  Elbow Flexion: 4/5  Intrinsics: 4/5  EPL (Extensor Pollicis Longus): 4/5    Vascular Exam     Right Pulses      Radial:                    2+      Left Pulses      Radial:                    2+      Capillary Refill  Right Hand: normal capillary refill  Left Hand: normal capillary refill    Edema  Right Forearm: absent  Left Forearm: absent      Relevant imaging results reviewed and interpreted by me, discussed with the patient and / or family today   X-Ray Elbow Complete Right  Narrative: EXAMINATION:  XR ELBOW COMPLETE 3 VIEW RIGHT    CLINICAL HISTORY:  . Pain in right elbow    TECHNIQUE:  AP, lateral, and oblique views of the right elbow were performed.    COMPARISON:  None    FINDINGS:  There is no radiographic evidence of acute osseous, articular, or soft tissue abnormality.  Joint spaces are preserved.  Impression: No acute findings    Electronically signed by: Miller Jennings MD  Date:    01/29/2020  Time:    10:01     Assessment:     Encounter Diagnosis   Name Primary?    Lateral epicondylitis of both elbows Yes        Plan:     We reviewed with Sae Santiago today, the pathology and natural history  of his diagnosis. We had an extensive discussion as to the conservative treatment and management of their condition. We also discussed the variety of treatment options to include medication, physical therapy, diagnostic testing as well as other treatments.The decision was made to go forward with:     PT/OT    -voltaren gel if no neuosurg contraindication    -F/up 6-8 wk                  Disclaimer: This note was prepared using a voice recognition system and is likely to have sound alike errors within the text.

## 2020-01-29 NOTE — PLAN OF CARE
[facility]  Physical Therapy Initial Evaluation    Name: Sae Jorge [facility]  Clinic Number: [id number]    Therapy Diagnosis:   Encounter Diagnoses   Name Primary?    DDD (degenerative disc disease), cervical Yes    Muscle weakness      Physician: Rocio Helm PA*    Physician Orders: PT Eval and Treat  Medical Diagnosis from Referral: Postop check [Z09]  Evaluation Date: 1/29/2020  Authorization Period Expiration: 6/1/2020  Plan of Care Expiration: 3/25/2020  Visit # / Visits authorized: 1/ 60 (combined with OT)     Precautions: Standard and Cervical Surgical    Time In: 10:00 am   Time Out: 11:00 am   Total Billable Time: 15 minutes    SUBJECTIVE   Date of onset: January 9, 2020   History of current condition - Sae is a 49 y.o. male whom reports he had neck surgery on January 9.  He has had pain in his neck for many years but states he stayed active and would just take ibuprofen and fight through the pain.  In 2017 he had an MRI and therapy for the neck pain which helped for a while then it returned.  He has used a blow up traction and it would decrease the pain.  But then he kept having pain in other places after this.  He states around Thanksgiving he started having pain all the way down the arm into the L hand and he states he originally thought it was a heart attack.  He went to the ER that night and ruled out the heart and then imaging showed the disc bulge.  He states he had a cortisone pack for the neck issue which didn't change the R arm.  He started 5 months ago with pain in the R arm/elbow and states this was determined to be separate from the neck pain.  He has worn the strap for the arm and this has helped but he does have an appointment with orthopedics after this appointment for the elbow.    The L arm was hurting as well with some numbness and tingling.      He states now after surgery he has no pain in the L arm.  Today was first day to drive since the  "surgery but he is still wearing his neck brace.  He has been walking over a mile a day.  He states he hasn't taken pain pills in 2 days.  Patient states he was out of work since November with no pay since his company doesn't have leave.         Medical History:   Past Medical History:   Diagnosis Date    Anxiety     GERD (gastroesophageal reflux disease)     Hyperlipidemia     Hypertension        Surgical History:   Sae Santiago  has a past surgical history that includes Knee surgery (Left); Epidural steroid injection into cervical spine (N/A, 12/13/2019); Tonsillectomy; Surgical removal of pilonidal cyst; Anterior cervical discectomy w/ fusion (Bilateral, 1/9/2020); and Surgical removal of vertebral body of cervical spine (1/9/2020).    Medications:   Sae has a current medication list which includes the following prescription(s): acetaminophen, atorvastatin, buspirone, cetirizine, diclofenac sodium, hydrochlorothiazide, lorazepam, losartan, metoprolol succinate, multivitamin, and tizanidine.    Allergies:   Review of patient's allergies indicates:   Allergen Reactions    Lexapro [escitalopram oxalate]      Serotonin Syndrome ( Pt was seen in the emergency department)    Sulfa (sulfonamide antibiotics)      Room spinning with cold sweats        Imaging: CT scan films on 12/17/2019    Prior Therapy: Yes; helped some prior to surgery  Social History: Pt lives with their family  Occupation: Pt is a technician for office equipment.    Prior Level of Function: Lifting for his job with no issues  Current Level of Function: Currently not able to lift over 10 pounds and limiting with all activities due to surgical precautions.    Pain:  Current 0/10, worst 2/10, best 0/10   Location: neck and upper trapezius area  Description: soreness  Aggravating Factors: Walking  Easing Factors: TENS unit and muscle relaxer    Dominant Extremity: Right    Pts goals: Pt reported goals are "to be mobile and be able get " "to 100% for lifting with no restrictions."     OBJECTIVE   (x = not tested due to pain and/or inability to obtain test position)    RANGE OF MOTION:  Cervical ROM deferred due to surgical precautions.  To be measured at week 6 when cervical collar removed.    Cervical Right   (spine)  1/29/2020 Left     1/29/2020 Pain/Dysfunction with Movement Goal   Cervical Flexion (60) Not Tested ---     Cervical Extension (90) Not Tested ---     Cervical Side Bending (45) Not Tested Not Tested     Cervical Rotation (75) Not Tested Not Tested       Shoulder ROM Right  1/29/2020 Left  1/29/2020 Pain/Dysfunction with Movement Goal   Shoulder Flexion (180) WFL WFL     Shoulder Extension (60) WFL WFL     Shoulder Abduction (180) WFL WFL     Shoulder ER (90) WFL WFL     Shoulder IR (70) WFL WFL       STRENGTH:    U/E MMT Right  1/29/2020 Left  1/29/2020 Pain/Dysfunction with Movement Goal   Shoulder Flexion 4/5 4/5  5/5 B   Shoulder Extension 4/5 4/5  5/5 B   Shoulder Abduction 4/5 4/5  5/5 B   Shoulder Adduction 4/5 4/5  5/5 B   Shoulder IR 4/5 4/5  5/5 B   Shoulder ER   4/5 4/5  5/5 B   Middle Trapezius   4-/5 4-/5  5/5 B   Lower Trapezius 3+/5 3+/5  5/5 B   Elbow Flexion  4/5 4/5  5/5 B   Elbow Extension 4/5 4/5  5/5 B   Wrist Flexion 4/5 4/5  5/5 B   Wrist Extension 4/5 4/5  5/5 B       MUSCLE LENGTH:     Muscle Tested  Right  1/29/2020 Left   1/29/2020 Goal   Upper Trapezius  decreased decreased Normal B    Levator Scapulae  decreased decreased Normal B   Sternocleidomastoid decreased decreased Normal B   Scalenes  decreased decreased Normal B    Pectoralis Minor  decreased decreased Normal B     JOINT MOBILITY:   Deferred due to surgical precautions    SPECIAL TESTS:  Deferred due to surgical precautions    Sensation:  Sensation is intact to light touch    Palpation: Increased tone and tenderness noted with palpation of bilateral upper trapezius and levator scapula, anterior cervical musculature.      Posture:  Pt presents with " postural abnormalities which include: forward head and rounded shoulders       FUNCTION:     CMS Impairment/Limitation/Restriction for FOTO Neck Survey    Therapist reviewed FOTO scores for Sae Santiago on 1/29/2020.   FOTO documents entered into EPIC - see Media section.    Limitation Score: 44%         TREATMENT   Treatment Time In: 10:40 am   Treatment Time Out: 10:55 am   Total Treatment time separate from Evaluation: 15 minutes    Home Exercises and Patient Education Provided    Education/Self-Care provided: (15 minutes)   Patient educated on the impairments noted above and the effects of physical therapy intervention to improve overall condition and QOL.    Home exercise program performed with PT guidance for form and correctness.      Written Home Exercises Provided: yes.  Exercises were reviewed and Sae was able to demonstrate them prior to the end of the session.  Sae demonstrated good  understanding of the education provided.     See EMR under Patient Instructions for exercises provided 1/29/2020.    ASSESSMENT   Sae is a 49 y.o. male referred to outpatient Physical Therapy with a medical diagnosis of Postop check [Z09]. Pt presents with impairments including: decreased ROM, decreased strength, decreased joint mobility, decreased muscle length, postural abnormalities and scar tissue.    Pt prognosis is Good.   Pt will benefit from skilled outpatient Physical Therapy to address the deficits stated above and in the chart below, provide pt/family education, and to maximize pt's level of independence.     Plan of care discussed with patient: Yes  Pt's spiritual, cultural and educational needs considered and patient is agreeable to the plan of care and goals as stated below:     Anticipated Barriers for therapy: co-morbidities    Medical Necessity is demonstrated by the following  History  Co-morbidities and personal factors that may impact the plan of care Co-morbidities:   anxiety, gout,  "high BMI and HTN    Personal Factors:   no deficits     moderate   Examination  Body Structures and Functions, activity limitations and participation restrictions that may impact the plan of care Body Regions:   head  neck  upper extremities  trunk    Body Systems:    gross symmetry  ROM  strength    Participation Restrictions:   See above in "Current Level of Function"     Activity limitations:   Learning and applying knowledge  no deficits    General Tasks and Commands  no deficits    Communication  no deficits    Mobility  lifting and carrying objects    Self care  no deficits    Domestic Life  shopping  cooking  doing house work (cleaning house, washing dishes, laundry)    Interactions/Relationships  no deficits    Life Areas  no deficits    Community and Social Life  community life  recreation and leisure         moderate   Clinical Presentation stable and uncomplicated low   Decision Making/ Complexity Score: low       GOALS:    Short Term Goals:  4 weeks    1. Pain: Pt will demonstrate improved pain by reports of less than or equal to 2/10 worst pain on the verbal rating scale in order to progress toward maximal functional ability and improve QOL.    2. Function: Patient will demonstrate improved function as indicated by a functional status score of less than or equal to 50 out of 100 on FOTO.    3. Mobility: Patient will improve AROM to 50% of stated goals, listed in objective measures above, in order to progress towards independence with functional activities.     4. Strength: Patient will improve strength to 50% of stated goals, listed in objective measures above, in order to progress towards independence with functional activities.     5. HEP: Patient will demonstrate independence with HEP in order to progress toward functional independence.      Long Term Goals:  8 weeks    1. Pain: Pt will demonstrate improved pain by reports of less than or equal to 1/10 worst pain on the verbal rating scale in order " to progress toward maximal functional ability and improve QOL.      2. Function: Patient will demonstrate improved function as indicated by a functional status score of less than or equal to 54 out of 100 on FOTO.    3. Mobility: Patient will improve AROM to stated goals, listed in objective measures above, in order to return to maximal functional potential and improve quality of life.    4. Strength: Patient will improve strength to stated goals, listed in objective measures above, in order to improve functional independence and quality of life.    5. Patient will return to normal ADL's, IADL's, community involvement, recreational activities, and work-related activities with less than or equal to 1/10 pain and maximal function.         PLAN   Plan of care Certification: 1/29/2020 to 3/25/2020.    Outpatient Physical Therapy 2 times weekly for 8 weeks to include any combination of the following interventions: dry needling, modalities, electrical stimulation (IFC, Pre-Mod, Attended with Functional Dry Needling), Gait Training, Manual Therapy, Moist Heat/ Ice, Neuromuscular Re-ed, Patient Education, Self Care, Therapeutic Activites and Therapeutic Exercise     Thank you for this referral.    These services are reasonable and necessary for the conditions set forth above while under my care.    Kimberli Burnett, PT, DPT

## 2020-01-29 NOTE — PATIENT INSTRUCTIONS
If you are experiencing pain/discomfort or have questions after 5pm and would like to be connected to the [facility] on call team, please call this number [phone] and specify which Orthopedics/Sports Medicine provider is treating you.

## 2020-01-30 ENCOUNTER — PATIENT MESSAGE (OUTPATIENT)
Dept: NEUROSURGERY | Facility: CLINIC | Age: 50
End: 2020-01-30

## 2020-02-01 PROBLEM — M62.81 MUSCLE WEAKNESS: Status: ACTIVE | Noted: 2020-02-01

## 2020-02-04 ENCOUNTER — CLINICAL SUPPORT (OUTPATIENT)
Dept: REHABILITATION | Facility: HOSPITAL | Age: 50
End: 2020-02-04
Payer: COMMERCIAL

## 2020-02-04 DIAGNOSIS — M50.30 DDD (DEGENERATIVE DISC DISEASE), CERVICAL: ICD-10-CM

## 2020-02-04 DIAGNOSIS — S16.1XXA STRAIN OF NECK MUSCLE, INITIAL ENCOUNTER: ICD-10-CM

## 2020-02-04 DIAGNOSIS — M62.81 MUSCLE WEAKNESS: ICD-10-CM

## 2020-02-04 PROCEDURE — 97140 MANUAL THERAPY 1/> REGIONS: CPT | Performed by: PHYSICAL THERAPIST

## 2020-02-04 PROCEDURE — 97110 THERAPEUTIC EXERCISES: CPT | Performed by: PHYSICAL THERAPIST

## 2020-02-05 RX ORDER — CYCLOBENZAPRINE HCL 10 MG
TABLET ORAL
Qty: 30 TABLET | Refills: 0 | Status: SHIPPED | OUTPATIENT
Start: 2020-02-05 | End: 2023-06-03 | Stop reason: ALTCHOICE

## 2020-02-10 NOTE — PROGRESS NOTES
Physical Therapy Daily Treatment Note     Name: Sae Jorge [facility]  Clinic Number: [id number]    Therapy Diagnosis:   Encounter Diagnoses   Name Primary?    Muscle weakness     DDD (degenerative disc disease), cervical      Physician: Rocio Helm PA*    Visit Date: 2/4/2020    Physician Orders: PT Eval and Treat  Medical Diagnosis from Referral: Postop check [Z09]  Evaluation Date: 1/29/2020  Authorization Period Expiration: 6/1/2020  Plan of Care Expiration: 3/25/2020  Visit # / Visits authorized: 1/ 60 (combined with OT)     Precautions: Standard and Cervical Surgical     Time In: 1:05 pm   Time Out: 1:50 pm   Total Billable Time: 40 minutes    SUBJECTIVE     Today, pt reports: he has been doing his exercises at home and still walking.  He is not having any pain.      He/She was compliant with home exercise program.  Response to previous treatment: good   Functional change: slight increase in walking.     Pre-Treatment Pain: 0/10  Post-Treatment Pain: 0/10  Location: neck  TREATMENT     Sae received therapeutic exercises to develop strength, endurance, ROM, flexibility, posture and core stabilization for 30 minutes including:    Exercise 2/4/2020   UBE 3 minutes forward, 3 minutes backward   Rows Yellow band 3 x 10    Ulnar nerve glides 1 minute each B upper extremity    Median nerve glides 1 minute each B upper extremity       Radian nerve glides  1 minute each B upper extremity       Transverse abdominal isometrics 1 minutes   Tricep extension, yellow band  3 x 10  B Upper extremity    Bicep curls, yellow band 3 x 10 B Upper extremity        Sae received the following manual therapy techniques: Myofacial release and Soft tissue Mobilization were applied for 10 minutes, including:  STM of right forearm flexors and forearm extensors at the elbow to decrease tissue tension and pain.        Sae participated in neuromuscular re-education activities to improve: Balance, Coordination,  Proprioception and Posture for 0 minutes. The following activities were included:    Exercise 2/4/2020   NA                                      Sae participated in dynamic functional therapeutic activities to improve functional performance for 0  minutes, including:    Exercise 2/4/2020   NA                                  Home Exercises Provided and Patient Education Provided     Education/Self-Care provided: (3 minutes)   Patient educated on biomechanical justification for therapeutic exercise and importance of compliance with HEP in order to improve overall impairments and QOL    Patient educated on postural awareness and the use of a lumbar roll when in a seated position to reduce stress and maintain optimal alignment of the spine.    Patient educated on proper ergonomics at the work station in order to maintain optimal alignment of the musculoskeletal system and improve efficiency in the work environment.   Patient educated on the importance of improved core and upper and lower extremity strength in order to improve alignment of the spine and upper and lower extremities with static positions and dynamic movement.       Written Home Exercises Provided: Patient instructed to cont prior HEP.  Exercises were reviewed and Sae was able to demonstrate them prior to the end of the session.  Sae demonstrated good  understanding of the education provided.     See EMR under Patient Instructions for exercises provided prior visit.    ASSESSMENT   Pt tolerated manual therapy well with reports of decreased pain and tension in musculature following intervention. Pt tolerated exercise well with reports of increased fatigue but no increased pain. Pt demonstrated good understanding of exercises and required minimal cueing to maintain proper form.    Sae is progressing well towards his goals.   Pt prognosis is Good.     Pt will continue to benefit from skilled outpatient physical therapy to address the  deficits listed in the problem list box on initial evaluation, provide pt/family education and to maximize pt's level of independence in the home and community environment.     Pt's spiritual, cultural and educational needs considered and pt agreeable to plan of care and goals.     Anticipated barriers to physical therapy: co-morbidities    Goals:   Short Term Goals:  4 weeks     1. Pain: Pt will demonstrate improved pain by reports of less than or equal to 2/10 worst pain on the verbal rating scale in order to progress toward maximal functional ability and improve QOL.     2. Function: Patient will demonstrate improved function as indicated by a functional status score of less than or equal to 50 out of 100 on FOTO.     3. Mobility: Patient will improve AROM to 50% of stated goals, listed in objective measures above, in order to progress towards independence with functional activities.      4. Strength: Patient will improve strength to 50% of stated goals, listed in objective measures above, in order to progress towards independence with functional activities.      5. HEP: Patient will demonstrate independence with HEP in order to progress toward functional independence.        Long Term Goals:  8 weeks     1. Pain: Pt will demonstrate improved pain by reports of less than or equal to 1/10 worst pain on the verbal rating scale in order to progress toward maximal functional ability and improve QOL.       2. Function: Patient will demonstrate improved function as indicated by a functional status score of less than or equal to 54 out of 100 on FOTO.     3. Mobility: Patient will improve AROM to stated goals, listed in objective measures above, in order to return to maximal functional potential and improve quality of life.     4. Strength: Patient will improve strength to stated goals, listed in objective measures above, in order to improve functional independence and quality of life.     5. Patient will return to  normal ADL's, IADL's, community involvement, recreational activities, and work-related activities with less than or equal to 1/10 pain and maximal function.             PLAN   Continue Plan of Care (POC) and progress per patient tolerance.    Kimberli Burnett, PT, DPT

## 2020-02-13 ENCOUNTER — CLINICAL SUPPORT (OUTPATIENT)
Dept: REHABILITATION | Facility: HOSPITAL | Age: 50
End: 2020-02-13
Payer: COMMERCIAL

## 2020-02-13 DIAGNOSIS — M62.81 MUSCLE WEAKNESS: ICD-10-CM

## 2020-02-13 DIAGNOSIS — M50.30 DDD (DEGENERATIVE DISC DISEASE), CERVICAL: ICD-10-CM

## 2020-02-13 PROCEDURE — 97140 MANUAL THERAPY 1/> REGIONS: CPT | Performed by: PHYSICAL THERAPIST

## 2020-02-13 PROCEDURE — 97110 THERAPEUTIC EXERCISES: CPT | Performed by: PHYSICAL THERAPIST

## 2020-02-13 NOTE — PROGRESS NOTES
"  Physical Therapy Daily Treatment Note     Name: Sae Jorge [facility] [place]  Clinic Number: [id number]    Therapy Diagnosis:   Encounter Diagnoses   Name Primary?    Muscle weakness     DDD (degenerative disc disease), cervical      Physician: Rocio Helm PA*    Visit Date: 2/13/2020    Physician Orders: PT Eval and Treat  Medical Diagnosis from Referral: Postop check [Z09]  Evaluation Date: 1/29/2020  Authorization Period Expiration: 6/1/2020  Plan of Care Expiration: 3/25/2020  Visit # / Visits authorized: 3/ 60 (combined with OT)     Precautions: Standard and Cervical Surgical     Time In: 3:00 pm   Time Out: 3:50 pm   Total Billable Time: 45 minutes    SUBJECTIVE     Today, pt reports: he has been getting cramps in his pecs on the left side.  He states he tries to stretch it out and it feels like it goes away some.  His right elbow is still hurting but the topical cream that the MD gave him has been working.      He/She was compliant with home exercise program.  Response to previous treatment: good   Functional change: slight increase in walking.     Pre-Treatment Pain: 0/10  Post-Treatment Pain: 0/10  Location: neck  TREATMENT     Sae received therapeutic exercises to develop strength, endurance, ROM, flexibility, posture and core stabilization for 30 minutes including:    Exercise 2/13/2020   UBE 3 minutes forward, 3 minutes backward   Rows Yellow band 3 x 10    Ulnar nerve glides ---   Median nerve glides ---   Radian nerve glides  ---   Transverse abdominal isometrics ---   Tricep extension, yellow band  3 x 10  B Upper extremity    Bicep curls, yellow band 3 x 10 B Upper extremity    Corner stretch 3 x 30"            Sae received the following manual therapy techniques: Myofacial release and Soft tissue Mobilization were applied for 15 minutes, including:  STM of right forearm flexors and forearm extensors at the elbow to decrease tissue tension and pain.  Subscapularis release on bilateral " shoulders along the medial border with levator and upper trapezius release on the left as well.        Sae participated in neuromuscular re-education activities to improve: Balance, Coordination, Proprioception and Posture for 0 minutes. The following activities were included:    Exercise 2/13/2020   NA                                      Sae participated in dynamic functional therapeutic activities to improve functional performance for 0  minutes, including:    Exercise 2/13/2020   NA                                  Home Exercises Provided and Patient Education Provided     Education/Self-Care provided: (3 minutes)   Patient educated on biomechanical justification for therapeutic exercise and importance of compliance with HEP in order to improve overall impairments and QOL    Patient educated on postural awareness and the use of a lumbar roll when in a seated position to reduce stress and maintain optimal alignment of the spine.    Patient educated on proper ergonomics at the work station in order to maintain optimal alignment of the musculoskeletal system and improve efficiency in the work environment.   Patient educated on the importance of improved core and upper and lower extremity strength in order to improve alignment of the spine and upper and lower extremities with static positions and dynamic movement.       Written Home Exercises Provided: Patient instructed to cont prior HEP.  Exercises were reviewed and Sae was able to demonstrate them prior to the end of the session.  Sae demonstrated good  understanding of the education provided.     See EMR under Patient Instructions for exercises provided prior visit.    ASSESSMENT   Patient had good release of the left shoulder today as well as decreased tension in the right forearm.      Sae is progressing well towards his goals.   Pt prognosis is Good.     Pt will continue to benefit from skilled outpatient physical therapy to address  the deficits listed in the problem list box on initial evaluation, provide pt/family education and to maximize pt's level of independence in the home and community environment.     Pt's spiritual, cultural and educational needs considered and pt agreeable to plan of care and goals.     Anticipated barriers to physical therapy: co-morbidities    Goals:   Short Term Goals:  4 weeks     1. Pain: Pt will demonstrate improved pain by reports of less than or equal to 2/10 worst pain on the verbal rating scale in order to progress toward maximal functional ability and improve QOL.     2. Function: Patient will demonstrate improved function as indicated by a functional status score of less than or equal to 50 out of 100 on FOTO.     3. Mobility: Patient will improve AROM to 50% of stated goals, listed in objective measures above, in order to progress towards independence with functional activities.      4. Strength: Patient will improve strength to 50% of stated goals, listed in objective measures above, in order to progress towards independence with functional activities.      5. HEP: Patient will demonstrate independence with HEP in order to progress toward functional independence.        Long Term Goals:  8 weeks     1. Pain: Pt will demonstrate improved pain by reports of less than or equal to 1/10 worst pain on the verbal rating scale in order to progress toward maximal functional ability and improve QOL.       2. Function: Patient will demonstrate improved function as indicated by a functional status score of less than or equal to 54 out of 100 on FOTO.     3. Mobility: Patient will improve AROM to stated goals, listed in objective measures above, in order to return to maximal functional potential and improve quality of life.     4. Strength: Patient will improve strength to stated goals, listed in objective measures above, in order to improve functional independence and quality of life.     5. Patient will return to  normal ADL's, IADL's, community involvement, recreational activities, and work-related activities with less than or equal to 1/10 pain and maximal function.             PLAN   Continue Plan of Care (POC) and progress per patient tolerance.    Kimberli Burnett, PT, DPT

## 2020-02-19 ENCOUNTER — CLINICAL SUPPORT (OUTPATIENT)
Dept: REHABILITATION | Facility: HOSPITAL | Age: 50
End: 2020-02-19
Payer: COMMERCIAL

## 2020-02-19 ENCOUNTER — OFFICE VISIT (OUTPATIENT)
Dept: NEUROSURGERY | Facility: CLINIC | Age: 50
End: 2020-02-19
Payer: COMMERCIAL

## 2020-02-19 ENCOUNTER — HOSPITAL ENCOUNTER (OUTPATIENT)
Dept: RADIOLOGY | Facility: HOSPITAL | Age: 50
Discharge: HOME | End: 2020-02-19
Attending: PHYSICIAN ASSISTANT
Payer: COMMERCIAL

## 2020-02-19 VITALS
HEIGHT: 73 IN | BODY MASS INDEX: 30.59 KG/M2 | RESPIRATION RATE: 18 BRPM | SYSTOLIC BLOOD PRESSURE: 136 MMHG | HEART RATE: 84 BPM | WEIGHT: 230.81 LBS | DIASTOLIC BLOOD PRESSURE: 83 MMHG

## 2020-02-19 DIAGNOSIS — M50.30 DDD (DEGENERATIVE DISC DISEASE), CERVICAL: ICD-10-CM

## 2020-02-19 DIAGNOSIS — Z98.1 S/P CERVICAL SPINAL FUSION: ICD-10-CM

## 2020-02-19 DIAGNOSIS — Z09 POSTOP CHECK: ICD-10-CM

## 2020-02-19 DIAGNOSIS — M62.81 MUSCLE WEAKNESS: ICD-10-CM

## 2020-02-19 DIAGNOSIS — M50.30 DEGENERATIVE DISC DISEASE, CERVICAL: Primary | ICD-10-CM

## 2020-02-19 PROCEDURE — 99999 PR PBB SHADOW E&M-EST. PATIENT-LVL III: CPT | Mod: PBBFAC,,, | Performed by: NEUROLOGICAL SURGERY

## 2020-02-19 PROCEDURE — 99999 PR PBB SHADOW E&M-EST. PATIENT-LVL III: ICD-10-PCS | Mod: PBBFAC,,, | Performed by: NEUROLOGICAL SURGERY

## 2020-02-19 PROCEDURE — 72040 X-RAY EXAM NECK SPINE 2-3 VW: CPT | Mod: TC

## 2020-02-19 PROCEDURE — 97140 MANUAL THERAPY 1/> REGIONS: CPT | Performed by: PHYSICAL THERAPIST

## 2020-02-19 PROCEDURE — 99024 POSTOP FOLLOW-UP VISIT: CPT | Mod: S$GLB,,, | Performed by: NEUROLOGICAL SURGERY

## 2020-02-19 PROCEDURE — 72040 XR CERVICAL SPINE AP LATERAL: ICD-10-PCS | Mod: 26,,, | Performed by: RADIOLOGY

## 2020-02-19 PROCEDURE — 72040 X-RAY EXAM NECK SPINE 2-3 VW: CPT | Mod: 26,,, | Performed by: RADIOLOGY

## 2020-02-19 PROCEDURE — 97110 THERAPEUTIC EXERCISES: CPT | Performed by: PHYSICAL THERAPIST

## 2020-02-19 PROCEDURE — 99024 PR POST-OP FOLLOW-UP VISIT: ICD-10-PCS | Mod: S$GLB,,, | Performed by: NEUROLOGICAL SURGERY

## 2020-02-19 NOTE — PROGRESS NOTES
Subjective:      Patient ID: Sae Santiago is a 49 y.o. male.    Chief Complaint: Post-op Evaluation (#2  Laminectomy 1/9/20 )    HPI   6 weeks post op C6 corpectomy   0/10 pain   Left arm pain is relieved  Has right elbow pain as he did 5 months prior to surgery  He is being seen orthopedics for this  His weakness in the left arm is getting better with therapy  Tolerating his C-collar as directed  No other complaints at this time    ROS      Objective:            General    Constitutional: He is oriented to person, place, and time. He appears well-developed and well-nourished.   Neck: Normal range of motion.   Cardiovascular: Normal rate and regular rhythm.    Pulmonary/Chest: Effort normal.   Abdominal: Soft.   Neurological: He is alert and oriented to person, place, and time.   Psychiatric: He has a normal mood and affect. His behavior is normal.           Neurosurgery Exam-    Moves all extremities well  Slight weakness in the left  better than prior to surgery  Sensation intact to light touch  Pain to deep palpation of lateral portion of the right elbow                X-ray of the cervical spine shows instrumentation in place  No hardware complication identified      Assessment:       Encounter Diagnoses   Name Primary?    Degenerative disc disease, cervical Yes    S/P cervical spinal fusion           Plan:       Sae was seen today for post-op evaluation.    Diagnoses and all orders for this visit:    Degenerative disc disease, cervical    S/P cervical spinal fusion          Continue outpatient physical therapy  Cervical collar x6 more weeks  Follow-up as directed for postoperative evaluation with x-rays AP and lateral cervical spine    Thank you for the referral   Please call with any questions    Ari Grossman MD  Neurosurgery     Disclaimer: This note was prepared using a voice recognition system and is likely to have sound alike errors within the text.

## 2020-02-20 ENCOUNTER — PATIENT MESSAGE (OUTPATIENT)
Dept: NEUROSURGERY | Facility: CLINIC | Age: 50
End: 2020-02-20

## 2020-02-27 ENCOUNTER — CLINICAL SUPPORT (OUTPATIENT)
Dept: REHABILITATION | Facility: HOSPITAL | Age: 50
End: 2020-02-27
Payer: COMMERCIAL

## 2020-02-27 DIAGNOSIS — M50.30 DDD (DEGENERATIVE DISC DISEASE), CERVICAL: ICD-10-CM

## 2020-02-27 DIAGNOSIS — M62.81 MUSCLE WEAKNESS: ICD-10-CM

## 2020-02-27 PROCEDURE — 97140 MANUAL THERAPY 1/> REGIONS: CPT | Performed by: PHYSICAL THERAPIST

## 2020-02-27 PROCEDURE — 97110 THERAPEUTIC EXERCISES: CPT | Performed by: PHYSICAL THERAPIST

## 2020-02-27 NOTE — PROGRESS NOTES
"  Physical Therapy Daily Treatment Note     Name: Sae Jorge [facility]  Clinic Number: [id number]    Therapy Diagnosis:   Encounter Diagnoses   Name Primary?    Muscle weakness     DDD (degenerative disc disease), cervical      Physician: Rocio Helm PA*    Visit Date: 2/19/2020    Physician Orders: PT Eval and Treat  Medical Diagnosis from Referral: Postop check [Z09]  Evaluation Date: 1/29/2020  Authorization Period Expiration: 6/1/2020  Plan of Care Expiration: 3/25/2020  Visit # / Visits authorized: 4 / 60 (combined with OT)     Precautions: Standard and Cervical Surgical     Time In: 10:00 am   Time Out: 11:00 am   Total Billable Time: 50 minutes    SUBJECTIVE     Today, pt reports: he saw Dr. Grossman just now and states he has to wear the brace for another 6 weeks.  He states the elbow is still giving him issues.      He/She was compliant with home exercise program.  Response to previous treatment: good   Functional change: slight increase in walking.     Pre-Treatment Pain: 0/10  Post-Treatment Pain: 0/10  Location: neck  TREATMENT     Sae received therapeutic exercises to develop strength, endurance, ROM, flexibility, posture and core stabilization for 30 minutes including:    Exercise 2/19/2020   UBE 3 minutes forward, 3 minutes backward   Rows on functional  10#, 3 x 10    Ulnar nerve glides ---   Median nerve glides ---   Radian nerve glides  ---   Transverse abdominal isometrics 2 minutes   Tricep extension, on functional  3 x 10  B Upper extremity, 10#   Bicep curls, on functional  3 x 10 B Upper extremity, 10#     Corner stretch 3 x 30"    Chin tucks, no brace 30x        Sae received the following manual therapy techniques: Myofacial release and Soft tissue Mobilization were applied for 20 minutes, including:  STM of right forearm flexors and forearm extensors at the elbow to decrease tissue tension and pain.  Cervical PROM in pain free range for sidebending and " rotation with strumming and soft tissue mobilization to the cervical musculature and upper trapezius to promote relaxation for ROM.        Sae participated in neuromuscular re-education activities to improve: Balance, Coordination, Proprioception and Posture for 0 minutes. The following activities were included:    Exercise 2/19/2020   NA                                      Sae participated in dynamic functional therapeutic activities to improve functional performance for 0  minutes, including:    Exercise 2/19/2020   NA                                  Home Exercises Provided and Patient Education Provided     Education/Self-Care provided: (3 minutes)   Patient educated on biomechanical justification for therapeutic exercise and importance of compliance with HEP in order to improve overall impairments and QOL    Patient educated on postural awareness and the use of a lumbar roll when in a seated position to reduce stress and maintain optimal alignment of the spine.    Patient educated on proper ergonomics at the work station in order to maintain optimal alignment of the musculoskeletal system and improve efficiency in the work environment.   Patient educated on the importance of improved core and upper and lower extremity strength in order to improve alignment of the spine and upper and lower extremities with static positions and dynamic movement.       Written Home Exercises Provided: Patient instructed to cont prior HEP.  Exercises were reviewed and Sae was able to demonstrate them prior to the end of the session.  Sae demonstrated good  understanding of the education provided.     See EMR under Patient Instructions for exercises provided prior visit.    ASSESSMENT   PT spoke with Dr. Grossman while patient in session for clearance on pain free passive range of motion.  Patient had increased guarding with this but was able to relax with added soft tissue work during PROM.      Sae is  progressing well towards his goals.   Pt prognosis is Good.     Pt will continue to benefit from skilled outpatient physical therapy to address the deficits listed in the problem list box on initial evaluation, provide pt/family education and to maximize pt's level of independence in the home and community environment.     Pt's spiritual, cultural and educational needs considered and pt agreeable to plan of care and goals.     Anticipated barriers to physical therapy: co-morbidities    Goals:   Short Term Goals:  4 weeks     1. Pain: Pt will demonstrate improved pain by reports of less than or equal to 2/10 worst pain on the verbal rating scale in order to progress toward maximal functional ability and improve QOL.     2. Function: Patient will demonstrate improved function as indicated by a functional status score of less than or equal to 50 out of 100 on FOTO.     3. Mobility: Patient will improve AROM to 50% of stated goals, listed in objective measures above, in order to progress towards independence with functional activities.      4. Strength: Patient will improve strength to 50% of stated goals, listed in objective measures above, in order to progress towards independence with functional activities.      5. HEP: Patient will demonstrate independence with HEP in order to progress toward functional independence.        Long Term Goals:  8 weeks     1. Pain: Pt will demonstrate improved pain by reports of less than or equal to 1/10 worst pain on the verbal rating scale in order to progress toward maximal functional ability and improve QOL.       2. Function: Patient will demonstrate improved function as indicated by a functional status score of less than or equal to 54 out of 100 on FOTO.     3. Mobility: Patient will improve AROM to stated goals, listed in objective measures above, in order to return to maximal functional potential and improve quality of life.     4. Strength: Patient will improve strength to  stated goals, listed in objective measures above, in order to improve functional independence and quality of life.     5. Patient will return to normal ADL's, IADL's, community involvement, recreational activities, and work-related activities with less than or equal to 1/10 pain and maximal function.             PLAN   Continue Plan of Care (POC) and progress per patient tolerance.    Kimberli Burnett, PT, DPT

## 2020-02-28 RX ORDER — LOSARTAN POTASSIUM 100 MG/1
TABLET ORAL
Qty: 90 TABLET | Refills: 1 | Status: SHIPPED | OUTPATIENT
Start: 2020-02-28 | End: 2020-07-09 | Stop reason: SDUPTHER

## 2020-02-28 RX ORDER — METOPROLOL SUCCINATE 25 MG/1
TABLET, EXTENDED RELEASE ORAL
Qty: 135 TABLET | Refills: 1 | Status: SHIPPED | OUTPATIENT
Start: 2020-02-28 | End: 2020-07-09 | Stop reason: SDUPTHER

## 2020-03-01 NOTE — PATIENT INSTRUCTIONS
What To Expect After Functional Dry Needling ® Treatments           How will I feel after a session of FDN?     You may feel some soreness immediately after treatment in the area of the body you were treated. This does not always occur but should be expected and is considered normal. It can also take up to a few hours, or even until the next day, to feel an onset of soreness. The soreness may vary from person to person and based on the area of the body that was treated, but it typically feels like you had an intense workout at the gym. Soreness typically lasts 24-48 hours. Make sure to indicate to your provider at a follow-up appointment how long the soreness lasted.   Bruising from the treatment is possible, somehow uncommon, but it is not of concern. Some areas are more likely to bruise than others, including the shoulders, chest, face, and portions of the extremities. Large bruising rarely occurs, but is possible. Use ice to help decrease the bruising and if you feel concern, please call your provider.    It is common to feel tired/fatigued, energized, emotional, giggly, or out of it after treatment. This is a normal response that can last up to an hour or two after treatment. If this lasts beyond a day, contact your provider as a precaution.   There are times when treatment may actually exacerbate your symptoms. This is normal and may indicate that you need to follow up sooner with your practitioner to continue treatment. If this continues past the 24-48 hour window, keep note of it, as this can help your provider adjust your treatment plan if needed based on your report. This does not mean that FDN cannot help your condition.    What should I do after my treatment and what is recommended?    We highly recommend increasing your water intake for the next 24 hours after treatment to help avoid or reduce soreness. We also recommend soaking in a hot bath or hot tub to help relieve post treatment soreness and  to soften the symptoms associated with the treatment you received. After dry needling treatment, you may do the following based on your comfort level. Please note that if it hurts or exacerbates your symptoms, then discontinuing the activity is best.     Work out and/or stretch.   Participate in normal physical activity.   Massage the area.   Use heat or ice as preferred for post treatment soreness.   Stay hydrated.    If you have prescription medicines, continue to take them as prescribed.    What should I avoid after treatment?     Unfamiliar physical activities or sports.   Doing more than you normally do.   Excessive alcohol intake.     If you are feeling light headed, or experience difficulty breathing, chest pain, or any other concerning symptoms after treatment, call us immediately. If you are unable to get a hold of us, please call your physician.

## 2020-03-01 NOTE — PROGRESS NOTES
Physical Therapy Progress Note     Name: Sae Santiago  Clinic Number: [id number]    Therapy Diagnosis:   Encounter Diagnoses   Name Primary?    Muscle weakness     DDD (degenerative disc disease), cervical      Physician: Rocio Helm PA*    Visit Date: 2/27/2020    Physician Orders: PT Eval and Treat  Medical Diagnosis from Referral: Postop check [Z09]  Evaluation Date: 1/29/2020  Authorization Period Expiration: 6/1/2020  Plan of Care Expiration: 3/25/2020  Visit # / Visits authorized: 5 / 60 (combined with OT)     Precautions: Standard and Cervical Surgical     Time In: 9:00 am   Time Out: 10:00 am   Total Billable Time: 43 minutes    SUBJECTIVE     Today, pt reports: he went back to work last week on light duty.  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  His elbow is still bothering him a lot.        He/She was compliant with home exercise program.  Response to previous treatment: good   Functional change: slight increase in walking.     Pre-Treatment Pain: 0/10  Post-Treatment Pain: 0/10  Location: neck    OBJECTIVE   (x = not tested due to pain and/or inability to obtain test position)     RANGE OF MOTION:  Cervical ROM deferred due to surgical precautions.  To be measured at week 6 when cervical collar removed.    Cervical Right   (spine)  2/27/2020 Left      2/27/2020 Pain/Dysfunction with Movement Goal   Cervical Flexion (60) 10% ---       Cervical Extension (90) 10% ---       Cervical Side Bending (45) 10% 10%       Cervical Rotation (75) 10% 10%          STRENGTH:     U/E MMT Right  1/29/2020; 2/27/2020 Left  1/29/2020; 2/27/2020 Pain/Dysfunction with Movement Goal   Shoulder Flexion 4/5; 4+/5 4/5; 4+/5   5/5 B   Shoulder Extension 4/5; 4+/5 4/5; 4+/5   5/5 B   Shoulder Abduction 4/5; 4+/5 4/5; 4+/5   5/5 B   Shoulder Adduction 4/5; 4+/5 4/5; 4+/5   5/5 B   Shoulder IR 4/5 4/5   5/5 B   Shoulder ER    4/5; 4+/5 4/5; 4+/5   5/5 B   Middle Trapezius    4-/5; 4/5  "4-/5; 4/5   5/5 B   Lower Trapezius 3+/5; 4-/5 3+/5; 4-/5   5/5 B   Elbow Flexion  4/5 4/5   5/5 B   Elbow Extension 4/5 4/5   5/5 B   Wrist Flexion 4/5 4/5   5/5 B   Wrist Extension 4/5 4/5   5/5 B         MUSCLE LENGTH:      Muscle Tested  Right  1/29/2020 Left   1/29/2020 Goal   Upper Trapezius  decreased decreased Normal B    Levator Scapulae  decreased decreased Normal B   Sternocleidomastoid decreased decreased Normal B   Scalenes  decreased decreased Normal B    Pectoralis Minor  decreased decreased Normal B         Palpation: Increased tone and tenderness noted with palpation of bilateral upper trapezius and levator scapula, anterior cervical musculature.       Posture:  Pt presents with postural abnormalities which include: forward head and rounded shoulders         FUNCTION:      CMS Impairment/Limitation/Restriction for FOTO Neck Survey     Therapist reviewed FOTO scores for Sae Santiago on 1/29/2020.   FOTO documents entered into [employer] - see Media section.     Limitation Score: 44% on evaluation   Score on 2/27/2020: 39%             TREATMENT     Sae received therapeutic exercises to develop strength, endurance, ROM, flexibility, posture and core stabilization for 18 minutes including:    Exercise 2/27/2020   UBE 3 minutes forward, 3 minutes backward   Rows on functional  10#, 3 x 10    Ulnar nerve glides ---   Median nerve glides ---   Radian nerve glides  ---   Transverse abdominal isometrics ---   Tricep extension, on functional  ---   Bicep curls, on functional  ---   Corner stretch 3 x 30"    Chin tucks, no brace 30x        Sae received the following manual therapy techniques: Myofacial release and Soft tissue Mobilization were applied for 25 minutes, including:  STM of right forearm flexors and forearm extensors with FDN to the extensor carpi radialis longus and brevis to decrease tissue tension and pain.  Cervical PROM in pain free range for sidebending and " rotation with strumming and soft tissue mobilization to the cervical musculature and upper trapezius to promote relaxation for ROM.        Sae participated in neuromuscular re-education activities to improve: Balance, Coordination, Proprioception and Posture for 0 minutes. The following activities were included:    Exercise 2/27/2020   NA                                      Sae participated in dynamic functional therapeutic activities to improve functional performance for 0  minutes, including:    Exercise 2/27/2020   NA                                  Home Exercises Provided and Patient Education Provided     Education/Self-Care provided: (3 minutes)   Patient educated on biomechanical justification for therapeutic exercise and importance of compliance with HEP in order to improve overall impairments and QOL    Patient educated on postural awareness and the use of a lumbar roll when in a seated position to reduce stress and maintain optimal alignment of the spine.    Patient educated on proper ergonomics at the work station in order to maintain optimal alignment of the musculoskeletal system and improve efficiency in the work environment.   Patient educated on the importance of improved core and upper and lower extremity strength in order to improve alignment of the spine and upper and lower extremities with static positions and dynamic movement.       Written Home Exercises Provided: Patient instructed to cont prior HEP.  Exercises were reviewed and Sae was able to demonstrate them prior to the end of the session.  Sae demonstrated good  understanding of the education provided.     See EMR under Patient Instructions for exercises provided prior visit.    ASSESSMENT   Patient continues with apprehension and guarding for cervical range of motion.  ROM measures from above are in supine with PT performing PROM.  Patient has increased strength and improved functional score.  The patient would  benefit from continued PT services to further progress his functional range of motion and strength.  Patient tolerated functional dry needling well with increased soreness in the arm but reported decreased pain after manual work.      Sae is progressing well towards his goals.   Pt prognosis is Good.     Pt will continue to benefit from skilled outpatient physical therapy to address the deficits listed in the problem list box on initial evaluation, provide pt/family education and to maximize pt's level of independence in the home and community environment.     Pt's spiritual, cultural and educational needs considered and pt agreeable to plan of care and goals.     Anticipated barriers to physical therapy: co-morbidities    Goals:   Short Term Goals:  4 weeks 2/27/2020   1. Pain: Pt will demonstrate improved pain by reports of less than or equal to 2/10 worst pain on the verbal rating scale in order to progress toward maximal functional ability and improve QOL. Progressing, 2/27/2020   2. Function: Patient will demonstrate improved function as indicated by a functional status score of less than or equal to 50 out of 100 on FOTO. Progressing, 2/27/2020   3. Mobility: Patient will improve AROM to 50% of stated goals, listed in objective measures above, in order to progress towards independence with functional activities.  Progressing, 2/27/2020   4. Strength: Patient will improve strength to 50% of stated goals, listed in objective measures above, in order to progress towards independence with functional activities.  Progressing, 2/27/2020   5. HEP: Patient will demonstrate independence with HEP in order to progress toward functional independence. Progressing, 2/27/2020      Long Term Goals:  8 weeks  2/27/2020   1. Pain: Pt will demonstrate improved pain by reports of less than or equal to 1/10 worst pain on the verbal rating scale in order to progress toward maximal functional ability and improve QOL.    Progressing, 2/27/2020   2. Function: Patient will demonstrate improved function as indicated by a functional status score of less than or equal to 54 out of 100 on FOTO. Progressing, 2/27/2020   3. Mobility: Patient will improve AROM to stated goals, listed in objective measures above, in order to return to maximal functional potential and improve quality of life. Progressing, 2/27/2020   4. Strength: Patient will improve strength to stated goals, listed in objective measures above, in order to improve functional independence and quality of life. Progressing, 2/27/2020   5. Patient will return to normal ADL's, IADL's, community involvement, recreational activities, and work-related activities with less than or equal to 1/10 pain and maximal function.  Progressing, 2/27/2020          PLAN   Continue Plan of Care (POC) and progress per patient tolerance.    Kimberli Burnett, PT, DPT

## 2020-03-01 NOTE — PLAN OF CARE
Physical Therapy Progress Note     Name: Sae Santiago  Clinic Number: [id number]    Therapy Diagnosis:   Encounter Diagnoses   Name Primary?    Muscle weakness     DDD (degenerative disc disease), cervical      Physician: Rocio Helm PA*    Visit Date: 2/27/2020    Physician Orders: PT Eval and Treat  Medical Diagnosis from Referral: Postop check [Z09]  Evaluation Date: 1/29/2020  Authorization Period Expiration: 6/1/2020  Plan of Care Expiration: 3/25/2020  Visit # / Visits authorized: 5 / 60 (combined with OT)     Precautions: Standard and Cervical Surgical     Time In: 9:00 am   Time Out: 10:00 am   Total Billable Time: 43 minutes    SUBJECTIVE     Today, pt reports: he went back to work last week on light duty.  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  His elbow is still bothering him a lot.        He/She was compliant with home exercise program.  Response to previous treatment: good   Functional change: slight increase in walking.     Pre-Treatment Pain: 0/10  Post-Treatment Pain: 0/10  Location: neck    OBJECTIVE   (x = not tested due to pain and/or inability to obtain test position)     RANGE OF MOTION:  Cervical ROM deferred due to surgical precautions.  To be measured at week 6 when cervical collar removed.    Cervical Right   (spine)  2/27/2020 Left      2/27/2020 Pain/Dysfunction with Movement Goal   Cervical Flexion (60) 10% ---       Cervical Extension (90) 10% ---       Cervical Side Bending (45) 10% 10%       Cervical Rotation (75) 10% 10%          STRENGTH:     U/E MMT Right  1/29/2020; 2/27/2020 Left  1/29/2020; 2/27/2020 Pain/Dysfunction with Movement Goal   Shoulder Flexion 4/5; 4+/5 4/5; 4+/5   5/5 B   Shoulder Extension 4/5; 4+/5 4/5; 4+/5   5/5 B   Shoulder Abduction 4/5; 4+/5 4/5; 4+/5   5/5 B   Shoulder Adduction 4/5; 4+/5 4/5; 4+/5   5/5 B   Shoulder IR 4/5 4/5   5/5 B   Shoulder ER    4/5; 4+/5 4/5; 4+/5   5/5 B   Middle Trapezius    4-/5; 4/5  "4-/5; 4/5   5/5 B   Lower Trapezius 3+/5; 4-/5 3+/5; 4-/5   5/5 B   Elbow Flexion  4/5 4/5   5/5 B   Elbow Extension 4/5 4/5   5/5 B   Wrist Flexion 4/5 4/5   5/5 B   Wrist Extension 4/5 4/5   5/5 B         MUSCLE LENGTH:      Muscle Tested  Right  1/29/2020 Left   1/29/2020 Goal   Upper Trapezius  decreased decreased Normal B    Levator Scapulae  decreased decreased Normal B   Sternocleidomastoid decreased decreased Normal B   Scalenes  decreased decreased Normal B    Pectoralis Minor  decreased decreased Normal B         Palpation: Increased tone and tenderness noted with palpation of bilateral upper trapezius and levator scapula, anterior cervical musculature.       Posture:  Pt presents with postural abnormalities which include: forward head and rounded shoulders         FUNCTION:      CMS Impairment/Limitation/Restriction for FOTO Neck Survey     Therapist reviewed FOTO scores for Sae Santiago on 1/29/2020.   FOTO documents entered into [employer] - see Media section.     Limitation Score: 44% on evaluation   Score on 2/27/2020: 39%             TREATMENT     Sae received therapeutic exercises to develop strength, endurance, ROM, flexibility, posture and core stabilization for 18 minutes including:    Exercise 2/27/2020   UBE 3 minutes forward, 3 minutes backward   Rows on functional  10#, 3 x 10    Ulnar nerve glides ---   Median nerve glides ---   Radian nerve glides  ---   Transverse abdominal isometrics ---   Tricep extension, on functional  ---   Bicep curls, on functional  ---   Corner stretch 3 x 30"    Chin tucks, no brace 30x        Sae received the following manual therapy techniques: Myofacial release and Soft tissue Mobilization were applied for 25 minutes, including:  STM of right forearm flexors and forearm extensors with FDN to the extensor carpi radialis longus and brevis to decrease tissue tension and pain.  Cervical PROM in pain free range for sidebending and " rotation with strumming and soft tissue mobilization to the cervical musculature and upper trapezius to promote relaxation for ROM.        Sae participated in neuromuscular re-education activities to improve: Balance, Coordination, Proprioception and Posture for 0 minutes. The following activities were included:    Exercise 2/27/2020   NA                                      Sae participated in dynamic functional therapeutic activities to improve functional performance for 0  minutes, including:    Exercise 2/27/2020   NA                                  Home Exercises Provided and Patient Education Provided     Education/Self-Care provided: (3 minutes)   Patient educated on biomechanical justification for therapeutic exercise and importance of compliance with HEP in order to improve overall impairments and QOL    Patient educated on postural awareness and the use of a lumbar roll when in a seated position to reduce stress and maintain optimal alignment of the spine.    Patient educated on proper ergonomics at the work station in order to maintain optimal alignment of the musculoskeletal system and improve efficiency in the work environment.   Patient educated on the importance of improved core and upper and lower extremity strength in order to improve alignment of the spine and upper and lower extremities with static positions and dynamic movement.       Written Home Exercises Provided: Patient instructed to cont prior HEP.  Exercises were reviewed and Sae was able to demonstrate them prior to the end of the session.  Sae demonstrated good  understanding of the education provided.     See EMR under Patient Instructions for exercises provided prior visit.    ASSESSMENT   Patient continues with apprehension and guarding for cervical range of motion.  ROM measures from above are in supine with PT performing PROM.  Patient has increased strength and improved functional score.  The patient would  benefit from continued PT services to further progress his functional range of motion and strength.  Patient tolerated functional dry needling well with increased soreness in the arm but reported decreased pain after manual work.      Sae is progressing well towards his goals.   Pt prognosis is Good.     Pt will continue to benefit from skilled outpatient physical therapy to address the deficits listed in the problem list box on initial evaluation, provide pt/family education and to maximize pt's level of independence in the home and community environment.     Pt's spiritual, cultural and educational needs considered and pt agreeable to plan of care and goals.     Anticipated barriers to physical therapy: co-morbidities    Goals:   Short Term Goals:  4 weeks 2/27/2020   1. Pain: Pt will demonstrate improved pain by reports of less than or equal to 2/10 worst pain on the verbal rating scale in order to progress toward maximal functional ability and improve QOL. Progressing, 2/27/2020   2. Function: Patient will demonstrate improved function as indicated by a functional status score of less than or equal to 50 out of 100 on FOTO. Progressing, 2/27/2020   3. Mobility: Patient will improve AROM to 50% of stated goals, listed in objective measures above, in order to progress towards independence with functional activities.  Progressing, 2/27/2020   4. Strength: Patient will improve strength to 50% of stated goals, listed in objective measures above, in order to progress towards independence with functional activities.  Progressing, 2/27/2020   5. HEP: Patient will demonstrate independence with HEP in order to progress toward functional independence. Progressing, 2/27/2020      Long Term Goals:  8 weeks  2/27/2020   1. Pain: Pt will demonstrate improved pain by reports of less than or equal to 1/10 worst pain on the verbal rating scale in order to progress toward maximal functional ability and improve QOL.    Progressing, 2/27/2020   2. Function: Patient will demonstrate improved function as indicated by a functional status score of less than or equal to 54 out of 100 on FOTO. Progressing, 2/27/2020   3. Mobility: Patient will improve AROM to stated goals, listed in objective measures above, in order to return to maximal functional potential and improve quality of life. Progressing, 2/27/2020   4. Strength: Patient will improve strength to stated goals, listed in objective measures above, in order to improve functional independence and quality of life. Progressing, 2/27/2020   5. Patient will return to normal ADL's, IADL's, community involvement, recreational activities, and work-related activities with less than or equal to 1/10 pain and maximal function.  Progressing, 2/27/2020          PLAN   Continue Plan of Care (POC) and progress per patient tolerance.    Kimberli Burnett, PT, DPT

## 2020-03-02 ENCOUNTER — PATIENT MESSAGE (OUTPATIENT)
Dept: NEUROSURGERY | Facility: CLINIC | Age: 50
End: 2020-03-02

## 2020-03-03 RX ORDER — TRAMADOL HYDROCHLORIDE 50 MG/1
50 TABLET ORAL EVERY 8 HOURS PRN
Qty: 60 TABLET | Refills: 0 | Status: SHIPPED | OUTPATIENT
Start: 2020-03-03 | End: 2023-06-03 | Stop reason: ALTCHOICE

## 2020-03-05 ENCOUNTER — CLINICAL SUPPORT (OUTPATIENT)
Dept: REHABILITATION | Facility: HOSPITAL | Age: 50
End: 2020-03-05
Payer: COMMERCIAL

## 2020-03-05 DIAGNOSIS — M50.30 DDD (DEGENERATIVE DISC DISEASE), CERVICAL: ICD-10-CM

## 2020-03-05 DIAGNOSIS — M62.81 MUSCLE WEAKNESS: ICD-10-CM

## 2020-03-05 PROCEDURE — 97140 MANUAL THERAPY 1/> REGIONS: CPT | Performed by: PHYSICAL THERAPIST

## 2020-03-05 PROCEDURE — 97110 THERAPEUTIC EXERCISES: CPT | Performed by: PHYSICAL THERAPIST

## 2020-03-11 NOTE — PROGRESS NOTES
"  Physical Therapy Daily Treatment Note     Name: Sae Jorge [facility]  Clinic Number: [id number]    Therapy Diagnosis:   Encounter Diagnoses   Name Primary?    Muscle weakness     DDD (degenerative disc disease), cervical      Physician: Rocio Helm PA*    Visit Date: 3/5/2020    Physician Orders: PT Eval and Treat  Medical Diagnosis from Referral: Postop check [Z09]  Evaluation Date: 1/29/2020  Authorization Period Expiration: 6/1/2020  Plan of Care Expiration: 3/25/2020  Visit # / Visits authorized: 6 / 60 (combined with OT)     Precautions: Standard and Cervical Surgical     Time In: 4:00 pm   Time Out: 5:00 pm   Total Billable Time: 45 minutes    SUBJECTIVE     Today, pt reports: he had some increased soreness and even pain in the neck on Monday of this week.  He states that he also had soreness in the arm but then the pain felt less.      He/She was compliant with home exercise program.  Response to previous treatment: good   Functional change: slight increase in walking.     Pre-Treatment Pain: 0/10  Post-Treatment Pain: 0/10  Location: neck      TREATMENT     Sae received therapeutic exercises to develop strength, endurance, ROM, flexibility, posture and core stabilization for 25 minutes including:    Exercise 3/5/2020   UBE 3 minutes forward, 3 minutes backward   Rows on functional  10#, 3 x 10    Ulnar nerve glides ---   Median nerve glides ---   Radian nerve glides  ---   Transverse abdominal isometrics ---   Tricep extension, on functional  30x    Bicep curls, on functional  30x   Corner stretch 3 x 30"    Chin tucks, no brace 30x        Sae received the following manual therapy techniques: Myofacial release and Soft tissue Mobilization were applied for 20 minutes, including:  STM of right forearm flexors and forearm extensors with FDN to the extensor carpi radialis longus and brevis to decrease tissue tension and pain.  Cervical PROM in pain free range for " sidebending and rotation with strumming and soft tissue mobilization to the cervical musculature and upper trapezius to promote relaxation for ROM.        Sae participated in neuromuscular re-education activities to improve: Balance, Coordination, Proprioception and Posture for 0 minutes. The following activities were included:    Exercise 3/5/2020   NA                                      Sae participated in dynamic functional therapeutic activities to improve functional performance for 0  minutes, including:    Exercise 3/5/2020   NA                                  Moist heat applied to the cervical spine and B upper trapezius with patient supine and cervical collar off to promote relaxation and decrease tension for 10 minutes - no charge.      Home Exercises Provided and Patient Education Provided     Education/Self-Care provided: (3 minutes)   Patient educated on biomechanical justification for therapeutic exercise and importance of compliance with HEP in order to improve overall impairments and QOL    Patient educated on postural awareness and the use of a lumbar roll when in a seated position to reduce stress and maintain optimal alignment of the spine.    Patient educated on proper ergonomics at the work station in order to maintain optimal alignment of the musculoskeletal system and improve efficiency in the work environment.   Patient educated on the importance of improved core and upper and lower extremity strength in order to improve alignment of the spine and upper and lower extremities with static positions and dynamic movement.       Written Home Exercises Provided: Patient instructed to cont prior HEP.  Exercises were reviewed and Sae was able to demonstrate them prior to the end of the session.  Sae demonstrated good  understanding of the education provided.     See EMR under Patient Instructions for exercises provided prior visit.    ASSESSMENT   Patient was tolerated session  well with the use of moist heat to increase relaxation of the cervical muscles and the patient as well.  He was able to tolerate slight increase in his passive range of motion in the neck despite him having increased anxiety regarding this.       Sae is progressing well towards his goals.   Pt prognosis is Good.     Pt will continue to benefit from skilled outpatient physical therapy to address the deficits listed in the problem list box on initial evaluation, provide pt/family education and to maximize pt's level of independence in the home and community environment.     Pt's spiritual, cultural and educational needs considered and pt agreeable to plan of care and goals.     Anticipated barriers to physical therapy: co-morbidities    Goals:   Short Term Goals:  4 weeks 2/27/2020   1. Pain: Pt will demonstrate improved pain by reports of less than or equal to 2/10 worst pain on the verbal rating scale in order to progress toward maximal functional ability and improve QOL. Progressing, 2/27/2020   2. Function: Patient will demonstrate improved function as indicated by a functional status score of less than or equal to 50 out of 100 on FOTO. Progressing, 2/27/2020   3. Mobility: Patient will improve AROM to 50% of stated goals, listed in objective measures above, in order to progress towards independence with functional activities.  Progressing, 2/27/2020   4. Strength: Patient will improve strength to 50% of stated goals, listed in objective measures above, in order to progress towards independence with functional activities.  Progressing, 2/27/2020   5. HEP: Patient will demonstrate independence with HEP in order to progress toward functional independence. Progressing, 2/27/2020      Long Term Goals:  8 weeks  2/27/2020   1. Pain: Pt will demonstrate improved pain by reports of less than or equal to 1/10 worst pain on the verbal rating scale in order to progress toward maximal functional ability and improve QOL.    Progressing, 2/27/2020   2. Function: Patient will demonstrate improved function as indicated by a functional status score of less than or equal to 54 out of 100 on FOTO. Progressing, 2/27/2020   3. Mobility: Patient will improve AROM to stated goals, listed in objective measures above, in order to return to maximal functional potential and improve quality of life. Progressing, 2/27/2020   4. Strength: Patient will improve strength to stated goals, listed in objective measures above, in order to improve functional independence and quality of life. Progressing, 2/27/2020   5. Patient will return to normal ADL's, IADL's, community involvement, recreational activities, and work-related activities with less than or equal to 1/10 pain and maximal function.  Progressing, 2/27/2020          PLAN   Continue Plan of Care (POC) and progress per patient tolerance.    Kimberli Burnett, PT, DPT

## 2020-03-13 ENCOUNTER — CLINICAL SUPPORT (OUTPATIENT)
Dept: REHABILITATION | Facility: HOSPITAL | Age: 50
End: 2020-03-13
Payer: COMMERCIAL

## 2020-03-13 DIAGNOSIS — M50.30 DDD (DEGENERATIVE DISC DISEASE), CERVICAL: ICD-10-CM

## 2020-03-13 DIAGNOSIS — M62.81 MUSCLE WEAKNESS: ICD-10-CM

## 2020-03-13 PROCEDURE — 97110 THERAPEUTIC EXERCISES: CPT | Performed by: PHYSICAL THERAPIST

## 2020-03-13 PROCEDURE — 97140 MANUAL THERAPY 1/> REGIONS: CPT | Performed by: PHYSICAL THERAPIST

## 2020-03-17 ENCOUNTER — PATIENT MESSAGE (OUTPATIENT)
Dept: NEUROSURGERY | Facility: CLINIC | Age: 50
End: 2020-03-17

## 2020-03-19 ENCOUNTER — PATIENT MESSAGE (OUTPATIENT)
Dept: NEUROSURGERY | Facility: CLINIC | Age: 50
End: 2020-03-19

## 2020-03-19 ENCOUNTER — TELEPHONE (OUTPATIENT)
Dept: NEUROSURGERY | Facility: CLINIC | Age: 50
End: 2020-03-19

## 2020-03-19 NOTE — TELEPHONE ENCOUNTER
Called patient no answer nor voicemail set will attempt to contact secondary contact number noted.

## 2020-03-19 NOTE — TELEPHONE ENCOUNTER
Left message informing pt were calling to change pt's appt to a virtual visit, informed pt we will make that change, he will be able to see it via the portal, if the time/date doesn't work he can give us a call back or send a portal message//ns    ----- Message from Janet Zapata sent at 3/19/2020  2:20 PM CDT -----  .Type:  Patient Returning Call    Who Called:self  Who Left Message for Patient:dayana  Does the patient know what this is regarding?:no  Would the patient rather a call back or a response via [facility]? call  Best Call Back Number:.[phone] (home)   Additional Information:

## 2020-03-19 NOTE — PROGRESS NOTES
"  Physical Therapy Daily Treatment Note     Name: Sae Jorge [facility]  Clinic Number: [id number]    Therapy Diagnosis:   Encounter Diagnoses   Name Primary?    Muscle weakness     DDD (degenerative disc disease), cervical      Physician: Rocio Helm PA*    Visit Date: 3/13/2020    Physician Orders: PT Eval and Treat  Medical Diagnosis from Referral: Postop check [Z09]  Evaluation Date: 1/29/2020  Authorization Period Expiration: 6/1/2020  Plan of Care Expiration: 3/25/2020  Visit # / Visits authorized: 6 / 60 (combined with OT)     Precautions: Standard and Cervical Surgical     Time In: 7:58 am   Time Out: 9:00 am   Total Billable Time: 45 minutes (billable time with PT)     SUBJECTIVE     Today, pt reports: he had some increased pain and spasm like activity in his neck into the left shoulder area.  The pain in the elbow is decreasing but still present.      He/She was compliant with home exercise program.  Response to previous treatment: good   Functional change: slight increase in walking.     Pre-Treatment Pain: 0/10  Post-Treatment Pain: 0/10  Location: neck      TREATMENT     Sae received therapeutic exercises to develop strength, endurance, ROM, flexibility, posture and core stabilization for 25 minutes including:    Exercise 3/13/2020   UBE 3 minutes forward, 3 minutes backward   Rows on functional  10#, 3 x 10    Ulnar nerve glides ---   Median nerve glides ---   Radian nerve glides  ---   Transverse abdominal isometrics ---   Tricep extension, on functional  30x    Bicep curls, on functional  30x   Corner stretch 3 x 30"    Shoulder extension  30x, 10# on functional    Chin tucks, no brace 30x        Sae received the following manual therapy techniques: Myofacial release and Soft tissue Mobilization were applied for 20 minutes, including:  STM of right forearm flexors and forearm extensors with FDN to the extensor carpi radialis longus and brevis to decrease " tissue tension and pain.  Cervical PROM in pain free range for sidebending and rotation with strumming and soft tissue mobilization to the cervical musculature and upper trapezius to promote relaxation for ROM.        Sae participated in neuromuscular re-education activities to improve: Balance, Coordination, Proprioception and Posture for 0 minutes. The following activities were included:    Exercise 3/13/2020   NA                                      Sae participated in dynamic functional therapeutic activities to improve functional performance for 0  minutes, including:    Exercise 3/13/2020   NA                                  Moist heat applied to the cervical spine and B upper trapezius with patient supine and cervical collar off to promote relaxation and decrease tension for 10 minutes - no charge.      Home Exercises Provided and Patient Education Provided     Education/Self-Care provided: (3 minutes)   Patient educated on biomechanical justification for therapeutic exercise and importance of compliance with HEP in order to improve overall impairments and QOL    Patient educated on postural awareness and the use of a lumbar roll when in a seated position to reduce stress and maintain optimal alignment of the spine.    Patient educated on proper ergonomics at the work station in order to maintain optimal alignment of the musculoskeletal system and improve efficiency in the work environment.   Patient educated on the importance of improved core and upper and lower extremity strength in order to improve alignment of the spine and upper and lower extremities with static positions and dynamic movement.       Written Home Exercises Provided: Patient instructed to cont prior HEP.  Exercises were reviewed and Sae was able to demonstrate them prior to the end of the session.  Sae demonstrated good  understanding of the education provided.     See EMR under Patient Instructions for exercises  provided prior visit.    ASSESSMENT   Patient tolerated use of heat well to help with relaxation during passive range of motion again today.      Sae is progressing well towards his goals.   Pt prognosis is Good.     Pt will continue to benefit from skilled outpatient physical therapy to address the deficits listed in the problem list box on initial evaluation, provide pt/family education and to maximize pt's level of independence in the home and community environment.     Pt's spiritual, cultural and educational needs considered and pt agreeable to plan of care and goals.     Anticipated barriers to physical therapy: co-morbidities    Goals:   Short Term Goals:  4 weeks 2/27/2020   1. Pain: Pt will demonstrate improved pain by reports of less than or equal to 2/10 worst pain on the verbal rating scale in order to progress toward maximal functional ability and improve QOL. Progressing, 2/27/2020   2. Function: Patient will demonstrate improved function as indicated by a functional status score of less than or equal to 50 out of 100 on FOTO. Progressing, 2/27/2020   3. Mobility: Patient will improve AROM to 50% of stated goals, listed in objective measures above, in order to progress towards independence with functional activities.  Progressing, 2/27/2020   4. Strength: Patient will improve strength to 50% of stated goals, listed in objective measures above, in order to progress towards independence with functional activities.  Progressing, 2/27/2020   5. HEP: Patient will demonstrate independence with HEP in order to progress toward functional independence. Progressing, 2/27/2020      Long Term Goals:  8 weeks  2/27/2020   1. Pain: Pt will demonstrate improved pain by reports of less than or equal to 1/10 worst pain on the verbal rating scale in order to progress toward maximal functional ability and improve QOL.   Progressing, 2/27/2020   2. Function: Patient will demonstrate improved function as indicated by a  functional status score of less than or equal to 54 out of 100 on FOTO. Progressing, 2/27/2020   3. Mobility: Patient will improve AROM to stated goals, listed in objective measures above, in order to return to maximal functional potential and improve quality of life. Progressing, 2/27/2020   4. Strength: Patient will improve strength to stated goals, listed in objective measures above, in order to improve functional independence and quality of life. Progressing, 2/27/2020   5. Patient will return to normal ADL's, IADL's, community involvement, recreational activities, and work-related activities with less than or equal to 1/10 pain and maximal function.  Progressing, 2/27/2020          PLAN   Continue Plan of Care (POC) and progress per patient tolerance.    Kimberli Burnett, PT, DPT

## 2020-03-23 ENCOUNTER — TELEPHONE (OUTPATIENT)
Dept: REHABILITATION | Facility: HOSPITAL | Age: 50
End: 2020-03-23

## 2020-03-23 PROBLEM — Z00.00 ANNUAL PHYSICAL EXAM: Status: RESOLVED | Noted: 2017-12-12 | Resolved: 2020-03-23

## 2020-03-23 NOTE — TELEPHONE ENCOUNTER
"Patient: Sae Santiago  Date: 3/23/2020  Diagnosis: No diagnosis found.  MRN: [id number]  Spoke with patient due to therapy following updates regarding COVID-19 closely and taking every precaution to ensure the safety of our patients, staff and community. In an abundance of caution and in an effort to help reduce risk and limit community spread, we have decided to temporarily postpone appointments for patients who may be at increased risk to attend in-person therapy or where therapy is not critically needed at this time. Plan of care and home exercise program were reviewed and patient has what they need to continue therapy at home. All patient questions were answered. Also stated to patient that we are exploring virtual methods of providing care and will be in touch over the next few weeks. Patient verbalized understanding to all.  Kimberli Burnett (Theresa), PT    3/23/2020  "

## 2020-03-27 ENCOUNTER — PATIENT MESSAGE (OUTPATIENT)
Dept: PAIN MEDICINE | Facility: CLINIC | Age: 50
End: 2020-03-27

## 2020-04-01 ENCOUNTER — PATIENT MESSAGE (OUTPATIENT)
Dept: NEUROSURGERY | Facility: CLINIC | Age: 50
End: 2020-04-01

## 2020-04-01 ENCOUNTER — OFFICE VISIT (OUTPATIENT)
Dept: NEUROSURGERY | Facility: CLINIC | Age: 50
End: 2020-04-01
Payer: COMMERCIAL

## 2020-04-01 ENCOUNTER — PATIENT OUTREACH (OUTPATIENT)
Dept: ADMINISTRATIVE | Facility: OTHER | Age: 50
End: 2020-04-01

## 2020-04-01 DIAGNOSIS — Z09 POSTOP CHECK: ICD-10-CM

## 2020-04-01 DIAGNOSIS — Z98.1 S/P CERVICAL SPINAL FUSION: Primary | ICD-10-CM

## 2020-04-01 PROCEDURE — 99024 POSTOP FOLLOW-UP VISIT: CPT | Mod: 95,,, | Performed by: PHYSICIAN ASSISTANT

## 2020-04-01 PROCEDURE — 99024 PR POST-OP FOLLOW-UP VISIT: ICD-10-PCS | Mod: 95,,, | Performed by: PHYSICIAN ASSISTANT

## 2020-04-01 NOTE — LETTER
April 1, 2020      [facility] Neurosurgery  [address]  [place] [address]  Phone: [phone]  Fax: [phone]       Patient: Sae Santiago   YOB: 1970  Date of Visit: 04/01/2020    To Whom It May Concern:    Robin Santiago  was at [facility] on 04/01/2020. He may resume work and is cleared to lift up to 50 lbs as long as patient is not experiencing any worsening pain or changes. If you have any questions or concerns, or if I can be of further assistance, please do not hesitate to contact me.    Sincerely,    Rocio Helm PA-C

## 2020-04-01 NOTE — PROGRESS NOTES
Subjective:      Patient ID: Sae Santiago is a 49 y.o. male.    Chief Complaint: No chief complaint on file.    HPI    The patient location is: HOME  The chief complaint leading to consultation is: POSTOP 3 CORPECTOMY  Visit type: Virtual visit with synchronous audio and video  Total time spent with patient: 15 MIN  Each patient to whom he or she provides medical services by telemedicine is:  (1) informed of the relationship between the physician and patient and the respective role of any other health care provider with respect to management of the patient; and (2) notified that he or she may decline to receive medical services by telemedicine and may withdraw from such care at any time.    Notes: Patient is approximately 12 week s/p C6 corpectomy. He denies any worsening pain. Does have spasms to L triceps area and wrist at times. He continues to make progress and is doing home exercises.    Date of Procedure: 1/9/2020    Procedure: Procedure(s) (LRB):   C6 corpectomy     Pre-Operative Diagnosis: Degenerative disc disease, cervical [M50.30]  Cervical radiculopathy [M54.12]  Myelopathy concurrent with and due to spinal stenosis of cervical region [M48.02, G99.2]  Abnormal x-ray of cervical spine [R93.7]   Post-Operative Diagnosis: Post-Op Diagnosis Codes:     * Degenerative disc disease, cervical [M50.30]     * Cervical radiculopathy [M54.12]     * Myelopathy concurrent with and due to spinal stenosis of cervical region [M48.02, G99.2]     * Abnormal x-ray of cervical spine [R93.7]   Technical Procedures Used:   Large herniated disc with superior extension C6-7 behind the vertebral body of C6    Description of the Findings of the Procedure:   1.  C6 corpectomy.  2.  Use of microscope  3.  Anterior plate C5-C7  4.  Placement of [employer] stratosphere expandable interbody cage  5.  Autograft bone  6.  Allograft bone      Review of Systems   Constitution: Negative for fever.   HENT: Negative for congestion.     Respiratory: Negative for cough and wheezing.    Skin: Negative for poor wound healing.   Musculoskeletal: Negative for back pain, falls, joint pain, muscle weakness, myalgias and neck pain.   Gastrointestinal: Negative for abdominal pain, bowel incontinence, diarrhea, nausea and vomiting.   Genitourinary: Negative for bladder incontinence.   Neurological: Negative for numbness and seizures.   Psychiatric/Behavioral: Negative for altered mental status.         Objective:            General    Constitutional: He is oriented to person, place, and time. He appears well-developed and well-nourished.   Neck: Normal range of motion.   Cardiovascular: Normal rate and regular rhythm.    Pulmonary/Chest: Effort normal.   Abdominal: Soft.   Neurological: He is alert and oriented to person, place, and time.   Psychiatric: He has a normal mood and affect. His behavior is normal.           Neurosurgery Exam:  MAEW  Incision is healing well  Patient tolerating diet  No numbness and tingling                    Assessment:       Encounter Diagnoses   Name Primary?    S/P cervical spinal fusion Yes    Postop check           Plan:       Diagnoses and all orders for this visit:    S/P cervical spinal fusion  -     CT Cervical Spine Without Contrast; Future    Postop check  -     CT Cervical Spine Without Contrast; Future      The patient is doing well following corpectomy at C6.   Continue exercises at home and advance to activities as tolerated.  We will assess his fusion with a CT of Cervical spine in 3 months.   Patient will follow-up after this study in our office.             Rocio Helm PA-C

## 2020-04-08 ENCOUNTER — TELEPHONE (OUTPATIENT)
Dept: REHABILITATION | Facility: HOSPITAL | Age: 50
End: 2020-04-08

## 2020-04-08 NOTE — TELEPHONE ENCOUNTER
"Patient: Sae Santiago  Date: 4/8/2020  Diagnosis: No diagnosis found.  MRN: [id number]  Followed up with patient regarding the start of virtual visits.  At this time, the patient states that he is doing well with his exercises and has had a virtual visit with the PA in Neurosurgery.  He states that at this time he does not feel that he needs a virtual visit but he will contact the therapist with any concerns he may have going forward.      Kimberli Burnett (Theresa), PT  4/8/2020  "

## 2020-05-13 ENCOUNTER — DOCUMENTATION ONLY (OUTPATIENT)
Dept: REHABILITATION | Facility: HOSPITAL | Age: 50
End: 2020-05-13

## 2020-05-13 PROBLEM — M62.81 MUSCLE WEAKNESS: Status: RESOLVED | Noted: 2020-02-01 | Resolved: 2020-05-13

## 2020-05-13 PROBLEM — M50.30 DDD (DEGENERATIVE DISC DISEASE), CERVICAL: Chronic | Status: RESOLVED | Noted: 2017-07-03 | Resolved: 2020-05-13

## 2020-05-13 RX ORDER — BUSPIRONE HYDROCHLORIDE 10 MG/1
TABLET ORAL
Qty: 270 TABLET | Refills: 0 | Status: SHIPPED | OUTPATIENT
Start: 2020-05-13 | End: 2020-07-16 | Stop reason: SDUPTHER

## 2020-05-13 NOTE — PROGRESS NOTES
Outpatient Therapy Discharge Summary     [id number]    Therapy Diagnosis:        Encounter Diagnoses   Name Primary?    Muscle weakness      DDD (degenerative disc disease), cervical        Physician: Rocio Helm PA*        Physician Orders: PT Eval and Treat  Medical Diagnosis from Referral: Postop check [Z09]  Evaluation Date: 1/29/2020    Date of Last visit: 3/13/2020  Total Visits Received: 7  Cancelled Visits: 3 due to Covid - 19   No Show Visits: 0    Assessment    Goals:    Short Term Goals:  4 weeks 2/27/2020   1. Pain: Pt will demonstrate improved pain by reports of less than or equal to 2/10 worst pain on the verbal rating scale in order to progress toward maximal functional ability and improve QOL. Progressing, 2/27/2020   2. Function: Patient will demonstrate improved function as indicated by a functional status score of less than or equal to 50 out of 100 on FOTO. Progressing, 2/27/2020   3. Mobility: Patient will improve AROM to 50% of stated goals, listed in objective measures above, in order to progress towards independence with functional activities.  Progressing, 2/27/2020   4. Strength: Patient will improve strength to 50% of stated goals, listed in objective measures above, in order to progress towards independence with functional activities.  Progressing, 2/27/2020   5. HEP: Patient will demonstrate independence with HEP in order to progress toward functional independence. Progressing, 2/27/2020      Long Term Goals:  8 weeks  2/27/2020   1. Pain: Pt will demonstrate improved pain by reports of less than or equal to 1/10 worst pain on the verbal rating scale in order to progress toward maximal functional ability and improve QOL.   Progressing, 2/27/2020   2. Function: Patient will demonstrate improved function as indicated by a functional status score of less than or equal to 54 out of 100 on FOTO. Progressing, 2/27/2020   3. Mobility: Patient  will improve AROM to stated goals, listed in objective measures above, in order to return to maximal functional potential and improve quality of life. Progressing, 2/27/2020   4. Strength: Patient will improve strength to stated goals, listed in objective measures above, in order to improve functional independence and quality of life. Progressing, 2/27/2020   5. Patient will return to normal ADL's, IADL's, community involvement, recreational activities, and work-related activities with less than or equal to 1/10 pain and maximal function.  Progressing, 2/27/2020          Discharge reason: Other:  Patient was contacted on 5/13/2020 to offer him a visit to return to the clinic next week.  He reports at this time that he is feeling well and does not feel that he needs more therapy at this time.      Plan   This patient is discharged from Physical Therapy

## 2020-05-18 ENCOUNTER — TELEPHONE (OUTPATIENT)
Dept: ORTHOPEDICS | Facility: CLINIC | Age: 50
End: 2020-05-18

## 2020-05-18 NOTE — TELEPHONE ENCOUNTER
Called pt regarding rescheduling his apt with another provider, pt states to cancel his apt for now until he is able to look at his work schedule, he states he will call back. Pt understood

## 2020-07-01 ENCOUNTER — HOSPITAL ENCOUNTER (OUTPATIENT)
Dept: RADIOLOGY | Facility: HOSPITAL | Age: 50
Discharge: HOME | End: 2020-07-01
Attending: PHYSICIAN ASSISTANT
Payer: COMMERCIAL

## 2020-07-01 DIAGNOSIS — Z09 POSTOP CHECK: ICD-10-CM

## 2020-07-01 DIAGNOSIS — Z98.1 S/P CERVICAL SPINAL FUSION: ICD-10-CM

## 2020-07-01 PROCEDURE — 72125 CT NECK SPINE W/O DYE: CPT | Mod: 26,,, | Performed by: RADIOLOGY

## 2020-07-01 PROCEDURE — 72125 CT CERVICAL SPINE WITHOUT CONTRAST: ICD-10-PCS | Mod: 26,,, | Performed by: RADIOLOGY

## 2020-07-01 PROCEDURE — 72125 CT NECK SPINE W/O DYE: CPT | Mod: TC

## 2020-07-05 ENCOUNTER — PATIENT OUTREACH (OUTPATIENT)
Dept: ADMINISTRATIVE | Facility: OTHER | Age: 50
End: 2020-07-05

## 2020-07-05 DIAGNOSIS — Z12.11 ENCOUNTER FOR FIT (FECAL IMMUNOCHEMICAL TEST) SCREENING: Primary | ICD-10-CM

## 2020-07-05 NOTE — PROGRESS NOTES
Chart Reviewed  Care Everywhere updated  Immunizations unable to be reconciled  Health Maintenance updated  Fit kit ordered

## 2020-07-07 ENCOUNTER — IMMUNIZATION (OUTPATIENT)
Dept: PHARMACY | Facility: CLINIC | Age: 50
End: 2020-07-07
Payer: COMMERCIAL

## 2020-07-07 ENCOUNTER — OFFICE VISIT (OUTPATIENT)
Dept: NEUROSURGERY | Facility: CLINIC | Age: 50
End: 2020-07-07
Payer: COMMERCIAL

## 2020-07-07 VITALS
HEART RATE: 105 BPM | SYSTOLIC BLOOD PRESSURE: 188 MMHG | BODY MASS INDEX: 32.02 KG/M2 | HEIGHT: 73 IN | WEIGHT: 241.63 LBS | DIASTOLIC BLOOD PRESSURE: 95 MMHG

## 2020-07-07 DIAGNOSIS — Z98.1 S/P CERVICAL SPINAL FUSION: Primary | ICD-10-CM

## 2020-07-07 DIAGNOSIS — Z09 POSTOP CHECK: ICD-10-CM

## 2020-07-07 DIAGNOSIS — M50.30 DEGENERATIVE DISC DISEASE, CERVICAL: ICD-10-CM

## 2020-07-07 PROCEDURE — 3008F PR BODY MASS INDEX (BMI) DOCUMENTED: ICD-10-PCS | Mod: CPTII,S$GLB,, | Performed by: PHYSICIAN ASSISTANT

## 2020-07-07 PROCEDURE — 99999 PR PBB SHADOW E&M-EST. PATIENT-LVL III: ICD-10-PCS | Mod: PBBFAC,,, | Performed by: PHYSICIAN ASSISTANT

## 2020-07-07 PROCEDURE — 99213 PR OFFICE/OUTPT VISIT, EST, LEVL III, 20-29 MIN: ICD-10-PCS | Mod: S$GLB,,, | Performed by: PHYSICIAN ASSISTANT

## 2020-07-07 PROCEDURE — 3077F SYST BP >= 140 MM HG: CPT | Mod: CPTII,S$GLB,, | Performed by: PHYSICIAN ASSISTANT

## 2020-07-07 PROCEDURE — 3080F PR MOST RECENT DIASTOLIC BLOOD PRESSURE >= 90 MM HG: ICD-10-PCS | Mod: CPTII,S$GLB,, | Performed by: PHYSICIAN ASSISTANT

## 2020-07-07 PROCEDURE — 3008F BODY MASS INDEX DOCD: CPT | Mod: CPTII,S$GLB,, | Performed by: PHYSICIAN ASSISTANT

## 2020-07-07 PROCEDURE — 3077F PR MOST RECENT SYSTOLIC BLOOD PRESSURE >= 140 MM HG: ICD-10-PCS | Mod: CPTII,S$GLB,, | Performed by: PHYSICIAN ASSISTANT

## 2020-07-07 PROCEDURE — 99999 PR PBB SHADOW E&M-EST. PATIENT-LVL III: CPT | Mod: PBBFAC,,, | Performed by: PHYSICIAN ASSISTANT

## 2020-07-07 PROCEDURE — 3080F DIAST BP >= 90 MM HG: CPT | Mod: CPTII,S$GLB,, | Performed by: PHYSICIAN ASSISTANT

## 2020-07-07 PROCEDURE — 99213 OFFICE O/P EST LOW 20 MIN: CPT | Mod: S$GLB,,, | Performed by: PHYSICIAN ASSISTANT

## 2020-07-07 NOTE — LETTER
July 7, 2020    Sae Santiago  [address] [address]         [facility]  [address] [address]  Phone: [phone]  Fax: [phone] July 7, 2020     Patient: Sae Santiago   YOB: 1970   Date of Visit: 7/7/2020       To Whom It May Concern:    It is my medical opinion that Sae Santiago may return to work and is able to lift up 80 lbs at this time.  If you have any questions or concerns, please don't hesitate to call.    Sincerely,        Rocio Helm PA-C

## 2020-07-07 NOTE — PROGRESS NOTES
"Subjective:      Patient ID: Sae Santiago is a 49 y.o. male.    Chief Complaint: Neck Pain    HPI  Patient presents today for CT follow-up/review.  He has no new complaints.  He does have the "usual" muscle soreness but denies increasing neck or arm pain.  He has been dealing with increasing BP.   Denies HA, Shortness of breath and chest pain.  Has been taking Tylenol, Flexeril and Tramadol as needed.  Rates his discomfort 2/10.       Date of Procedure: 1/9/2020    Procedure: Procedure(s) (LRB):   C6 corpectomy     Pre-Operative Diagnosis: Degenerative disc disease, cervical [M50.30]  Cervical radiculopathy [M54.12]  Myelopathy concurrent with and due to spinal stenosis of cervical region [M48.02, G99.2]  Abnormal x-ray of cervical spine [R93.7]   Post-Operative Diagnosis: Post-Op Diagnosis Codes:     * Degenerative disc disease, cervical [M50.30]     * Cervical radiculopathy [M54.12]     * Myelopathy concurrent with and due to spinal stenosis of cervical region [M48.02, G99.2]     * Abnormal x-ray of cervical spine [R93.7]   Technical Procedures Used:   Large herniated disc with superior extension C6-7 behind the vertebral body of C6    Description of the Findings of the Procedure:   1.  C6 corpectomy.  2.  Use of microscope  3.  Anterior plate C5-C7  4.  Placement of [employer]onic stratosphere expandable interbody cage  5.  Autograft bone  6.  Allograft bone       ROS   Constitution: Negative for fever.   HENT: Negative for congestion.    Respiratory: Negative for cough and wheezing.    Skin: Negative for poor wound healing.   Musculoskeletal: Negative for back pain, falls, joint pain, muscle weakness, myalgias and neck pain.   Gastrointestinal: Negative for abdominal pain, bowel incontinence, diarrhea, nausea and vomiting.   Genitourinary: Negative for bladder incontinence.   Neurological: Negative for numbness and seizures.   Psychiatric/Behavioral: Negative for altered mental status.          Objective:    "         Ortho/SPM Exam  General     Constitutional: He is oriented to person, place, and time. He appears well-developed and well-nourished.   Neck: Normal range of motion.   Cardiovascular: Normal rate and regular rhythm.    Pulmonary/Chest: Effort normal.   Abdominal: Soft.   Neurological: He is alert and oriented to person, place, and time.   Psychiatric: He has a normal mood and affect. His behavior is normal.               Neurosurgery Exam:  MAEW  Sensation intact to light touch  Incision has healed well  Trachea midline  Shoulder ROM WNL  NO weakness on exam          Imaging:  Details    Reading Physician Reading Date Result Priority   Fer Ulloa III, MD  [phone] 7/2/2020 Routine      Narrative & Impression     EXAMINATION:  CT CERVICAL SPINE WITHOUT CONTRAST     CLINICAL HISTORY:  C-spine fusion, follow up;Arthrodesis status     TECHNIQUE:  Low dose axial images, sagittal and coronal reformations were performed though the cervical spine.  Contrast was not administered.     COMPARISON:  February 19, 2020     FINDINGS:  There are postsurgical changes consistent with ACDF at the C5 through C7 levels.  There is also noted to be C6 corpectomy with prosthesis in place.  Orthopedic hardware appears intact without complication.  Vertebral body height, alignment and disc spaces appear maintained.  No acute fracture or subluxation.  Prevertebral soft tissues within normal limits.  Craniovertebral junction within normal limits.  Pre dens space normal.     C2-3 and C3-4: Minimal posterior ridging with slight effacement of the anterior subarachnoid space.  Minimal left foraminal stenosis at the C2-3 level and mild right and moderate left foraminal stenosis at the C3-4 level.  No central stenosis.     C4-5: Posterior mild concentric disc ridging with slight central prominence no herniation or protrusion.  Effaced anterior subarachnoid space and mild central stenosis with midline AP diameter 8.9 mm.  Mild right  foraminal stenosis.     C5-6: Postsurgical changes.  No central stenosis.  Minimal inferior foraminal stenosis.     C6-7: Mild to moderate central stenosis with effacement anterior subarachnoid space.  There is mild bilateral inferior foraminal stenosis.     Just inferior to the superior endplate of C7 there is prominent posterior marginal osteophyte.  Central effacement of the anterior subarachnoid space with posterior impression upon the right paracentral margin of the cord noted.  The right paracentral location there is more moderate to severe central stenosis with AP diameter at this level 5.1 mm.  Central AP measurement 7.4 mm.     C7-T1: No significant findings.  T1-2: No significant findings.     Included lung apices clear.  Few scattered bilateral subcentimeter and shotty short axis cervical nodes.  Minimal atherosclerotic calcification of the carotid vessels bilaterally in the bulb regions.  Anterior parapharyngeal spaces normal in appearance.  Laryngeal and pharyngeal soft tissues appear within normal limits.     Impression:     Postsurgical changes consistent with ACDF at the C5 through C7 levels with C6 corpectomy.     Mild multilevel varying degrees of foraminal and central stenosis.     Most pronounced findings along the posterior margin superior endplate C7.  See detailed discussion above.     Additional findings as detailed above.                 Assessment:       Encounter Diagnoses   Name Primary?    S/P cervical spinal fusion Yes    Postop check     Degenerative disc disease, cervical           Plan:       Sae was seen today for neck pain.    Diagnoses and all orders for this visit:    S/P cervical spinal fusion    Postop check    Degenerative disc disease, cervical          Recommend CT of C spine in 2 months and follow-up with MD after this study.    Patient will have BP taken again before leaving. He needs to contact PCP regarding HTN. If he experiences any change in symptoms, he will  need to go to [facility] PA-C

## 2020-07-08 ENCOUNTER — PATIENT MESSAGE (OUTPATIENT)
Dept: INTERNAL MEDICINE | Facility: CLINIC | Age: 50
End: 2020-07-08

## 2020-07-09 ENCOUNTER — PATIENT OUTREACH (OUTPATIENT)
Dept: OTHER | Facility: OTHER | Age: 50
End: 2020-07-09

## 2020-07-09 DIAGNOSIS — Z98.1 S/P CERVICAL SPINAL FUSION: Primary | ICD-10-CM

## 2020-07-09 DIAGNOSIS — M47.892 OTHER SPONDYLOSIS, CERVICAL REGION: ICD-10-CM

## 2020-07-09 RX ORDER — LORAZEPAM 0.5 MG/1
0.5 TABLET ORAL
Qty: 30 TABLET | Refills: 2 | Status: SHIPPED | OUTPATIENT
Start: 2020-07-09 | End: 2021-07-19 | Stop reason: SDUPTHER

## 2020-07-09 RX ORDER — LOSARTAN POTASSIUM 100 MG/1
TABLET ORAL
Qty: 90 TABLET | Refills: 1 | Status: SHIPPED | OUTPATIENT
Start: 2020-07-09 | End: 2020-07-16 | Stop reason: SDUPTHER

## 2020-07-09 RX ORDER — METOPROLOL SUCCINATE 25 MG/1
TABLET, EXTENDED RELEASE ORAL
Qty: 135 TABLET | Refills: 1 | Status: SHIPPED | OUTPATIENT
Start: 2020-07-09 | End: 2020-07-16 | Stop reason: SDUPTHER

## 2020-07-09 NOTE — PROGRESS NOTES
Digital Medicine: Health  Introduction    Introduced Sae Santiago to Digital Medicine. Discussed health  role and recommended lifestyle modifications.    Stated that stress is his biggest factor. Had spine surgery in the neck a few months ago and they just found something recently. Stressed out with kids trying to go out with COVID.Has very high anxiety.     Stated that he's gotten up to 220s. Goes through stages of controlled and uncontrolled. Dx at 17 with HTN.     Dad also was dx at 18 with HTN.     Had gotten down to 217-220lbs, weight is back up to 240.     Has ativan to take in case he's BP gets high.     The history is provided by the patient.     HYPERTENSION  Our goal is to get BP to consistently below 130/80mmHg and make the process convenient so patient can avoid extra trips to the office. Getting your blood pressure below 130/80mmHg (definition of control) will reduce your risk for heart attack, kidney failure, stroke and death (as well as kidney failure, eye disease, & dementia)      Reviewed that the Digital Medicine care team - consisting of a clinician and a health  - will follow the most current evidence-based national guidelines for treating your condition.  The health  will focus on lifestyle modifications and motivation while the clinician will focus on medication therapy.  The care team will review all data on a regular basis and reach out as needed.      Explained that one of the key parts of the program is communication with the care team.  Asked patient to respond to outreach attempts and complete questionnaires.  Stressed importance of medication adherence.    Reviewed non-pharmacologic therapies and impact on BP.      Explained that we expect patient to obtain several blood pressures per week at random times of day.  Instructed patient not to allow anyone else to use phone and monitoring device.  Confirmed appropriate BP monitoring technique.      Explained to patient  that the digital medicine team is not available for emergencies.  Patient will call [facility] on-call ([phone] or [phone]) or [phone] if needed.      Patient's BP goal is 130/80.Patient's BP average is 144/90 mmHg, which is above goal, per 2017 ACC/AHA Hypertension Guidelines.          Last 5 Patient Entered Readings                                      Current 30 Day Average: 144/90     Recent Readings 7/9/2020 7/9/2020 7/8/2020 7/8/2020 7/8/2020    SBP (mmHg) 143 152 144 158 139    DBP (mmHg) 88 91 83 95 90    Pulse 87 64 77 74 87            INTERVENTION(S)  recommended diet modifications, recommend physical activity, reviewed monitoring technique and encouragement/support    PLAN  patient verbalizes understanding and continue monitoring      There are no preventive care reminders to display for this patient.    Reviewed the importance of self-monitoring, medication adherence, and that the health  can be used as a resource for lifestyle modifications to help reduce or maintain a healthy lifestyle.    Sent link to [facility] webpages and my contact information via [employer] for future questions. Follow up scheduled.             Diet Screening   Breakfast is typically between. Coffee (black), Think Thin bar or Belvita bars.  Lunch is typically between. Turkey sandwich on wheat with sunchips.    Dinner is typically between. Grills meat, jambalaya .    Patient reports eating or drinking the following: fruit, water, fresh vegetables, caffeine and deli meat    The patient drinks 64 ounces of water per day.    He has the following dietary restrictions: weight-loss and low sodium dietHe cooks for self and has meals prepared by family.    Patient does the shopping for groceries and lets family grocery shop.  He gets groceries from the grocery store.      He does not find cooking difficult.      Barriers to a Healthy Diet: no barriers to healthy eating    Only drinks water, no sugar. No a sweets  person. Popcorn. Doesn't add salt to anything. Eating more fruit.     Assigning the following patient goals: maintain low sodium diet    Physical Activity Screening   When asked if exercising, patient responded: yesHis level of intensity when exercising is moderate.    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 Walks up and down his block in the the evenings to help with stress.     Assigning the following patient goal(s): 150 minutes of exercise per week and participate in exercise weekly    Medication Adherence Screening   He did not miss a dose this month.    Patient identified the following reasons for non-compliance: None    Tobacco and Alcohol Screening       No tobacco use    No alcohol use      SDOH

## 2020-07-09 NOTE — LETTER
July 9, 2020     Sae Santiago  [address] [address]       Dear Sae,    Welcome to [facility]! Our goal is to make care effective, proactive and convenient by using data you send us from home to better treat your chronic conditions.              My name is Rose Marie, and I am your dedicated Digital Medicine clinician. As an expert in medication management, I will help ensure that the medications you are taking continue to provide the intended benefits and help you reach your goals. You can reach me directly at [phone] or by sending me a message directly through your [facility] account.      I am Lenora Pritchett and I will be your health . My job is to help you identify lifestyle changes to improve your disease control. We will talk about nutrition, exercise, and other ways you may be able to adjust your current habits to better your health. Additionally, we will help ensure you are completing the tests and screenings that are necessary to help manage your conditions. You can reach me directly at [phone] or by sending me a message directly through your [facility] account.    Most importantly, YOU are at the center of this team. Together, we will work to improve your overall health and encourage you to meet your goals for a healthier lifestyle.     What we expect from YOU:  · Please take frequent home blood pressure measurements. We ask that you take at least 1 blood pressure reading per week, but more information will better help us get you know you. Be sure you rest for a few minutes before taking the reading in a quiet, comfortable place.     Be available to receive phone calls or [employer] messages, when appropriate, from your care team. Please let us know if there are any specific days or times that work best for us to reach you via phone.     Complete routine tests and screenings. Dont worry, we will help keep you on track!           What you  should expect from your Digital Medicine Care Team:   We will work with you to create a personalized plan of care and provide you with encouragement and education, including regarding lifestyle changes, that could help you manage your disease states.     We will adjust your current medications, if needed, and continue to monitor your long-term progress.     We will provide you and your physician with monthly progress reports after you have been in the program for more than 30 days.     We will send you reminders through [employer] and text messages to help ensure you do not miss any testing deadlines to help manage your disease states.    You will be able to reach us by phone or through your [facility] account by clicking our names under Care Team on the right side of the home screen.    I look forward to working with you to achieve your blood pressure goals!    We look forward to working with you to help manage your health,    Sincerely,    Your Digital Medicine Team    Please visit our websites to learn more:   · Hypertension: [email or web address]      Remember, we are not available for emergencies. If you have an emergency, please contact your doctors office directly or call [facility] on-call ([phone] or [phone]) or [phone].

## 2020-07-16 ENCOUNTER — OFFICE VISIT (OUTPATIENT)
Dept: INTERNAL MEDICINE | Facility: CLINIC | Age: 50
End: 2020-07-16
Payer: COMMERCIAL

## 2020-07-16 DIAGNOSIS — F41.9 ANXIETY: ICD-10-CM

## 2020-07-16 DIAGNOSIS — I10 ESSENTIAL HYPERTENSION: Primary | ICD-10-CM

## 2020-07-16 PROCEDURE — 99214 PR OFFICE/OUTPT VISIT, EST, LEVL IV, 30-39 MIN: ICD-10-PCS | Mod: 95,,, | Performed by: FAMILY MEDICINE

## 2020-07-16 PROCEDURE — 99214 OFFICE O/P EST MOD 30 MIN: CPT | Mod: 95,,, | Performed by: FAMILY MEDICINE

## 2020-07-16 RX ORDER — METOPROLOL SUCCINATE 25 MG/1
TABLET, EXTENDED RELEASE ORAL
Qty: 135 TABLET | Refills: 1 | Status: SHIPPED | OUTPATIENT
Start: 2020-07-16 | End: 2021-02-25 | Stop reason: SDUPTHER

## 2020-07-16 RX ORDER — LOSARTAN POTASSIUM 100 MG/1
TABLET ORAL
Qty: 90 TABLET | Refills: 1 | Status: SHIPPED | OUTPATIENT
Start: 2020-07-16 | End: 2021-02-25 | Stop reason: SDUPTHER

## 2020-07-16 RX ORDER — BUSPIRONE HYDROCHLORIDE 10 MG/1
TABLET ORAL
Qty: 270 TABLET | Refills: 1 | Status: SHIPPED | OUTPATIENT
Start: 2020-07-16 | End: 2021-02-25

## 2020-07-16 RX ORDER — HYDROCHLOROTHIAZIDE 25 MG/1
25 TABLET ORAL DAILY
Qty: 30 TABLET | Refills: 11 | Status: SHIPPED | OUTPATIENT
Start: 2020-07-16 | End: 2021-02-25 | Stop reason: SDUPTHER

## 2020-07-16 NOTE — PROGRESS NOTES
Subjective:       Patient ID: Sae Santiago is a 49 y.o. male.    Chief Complaint: No chief complaint on file.    The patient location is: Home  The chief complaint leading to consultation is: F/U    Visit type: AudioVisual    Face to Face time with patient: 10 minutes of total time spent on the encounter, which includes face to face time and non-face to face time preparing to see the patient (eg, review of tests), Obtaining and/or reviewing separately obtained history, Documenting clinical information in the electronic or other health record, Independently interpreting results (not separately reported) and communicating results to the patient/family/caregiver, or Care coordination (not separately reported).         Each patient to whom he or she provides medical services by telemedicine is:  (1) informed of the relationship between the physician and patient and the respective role of any other health care provider with respect to management of the patient; and (2) notified that he or she may decline to receive medical services by telemedicine and may withdraw from such care at any time.    Notes: Pt is a 49 year old who has HTN combined with anxiety issues. Pt BP goes up very high with anxiety. Has had some blood pressure as high at 180. Pt is on Buspar 10 mg. 1/2 tid. Pt is on losartan 100 mg and hydrochlorothiazide 12.5.     Review of Systems   Constitutional: Negative for activity change and unexpected weight change.   HENT: Negative for hearing loss, rhinorrhea and trouble swallowing.    Eyes: Negative for discharge and visual disturbance.   Respiratory: Negative for chest tightness and wheezing.    Cardiovascular: Negative for chest pain and palpitations.   Gastrointestinal: Negative for blood in stool, constipation, diarrhea and vomiting.   Endocrine: Negative for polydipsia and polyuria.   Genitourinary: Negative for difficulty urinating, hematuria and urgency.   Musculoskeletal: Positive for neck  pain. Negative for arthralgias and joint swelling.   Neurological: Negative for weakness and headaches.   Psychiatric/Behavioral: Positive for dysphoric mood. Negative for confusion. The patient is nervous/anxious.          Objective:      Physical Exam  Constitutional:       Appearance: Normal appearance.   Neurological:      Mental Status: He is alert and oriented to person, place, and time.   Psychiatric:         Mood and Affect: Mood normal.         Behavior: Behavior normal.         Assessment:       1. Essential hypertension    2. Anxiety        Plan:       Essential hypertension  Comments:  Will increase HCT 25.    Anxiety  Comments:  continue on the buspar but increase it to 1 tab tid. as he was only taking 1/2 tab tid    Other orders  -     hydroCHLOROthiazide (HYDRODIURIL) 25 MG tablet; Take 1 tablet (25 mg total) by mouth once daily.  Dispense: 30 tablet; Refill: 11  -     losartan (COZAAR) 100 MG tablet; TAKE 1 TABLET BY MOUTH ONCE DAILY  Dispense: 90 tablet; Refill: 1  -     metoprolol succinate (TOPROL-XL) 25 MG 24 hr tablet; TAKE 1 and 1/2 (ONE & ONE-HALF) TABLETS BY MOUTH AT NIGHT  Dispense: 135 tablet; Refill: 1  -     busPIRone (BUSPAR) 10 MG tablet; TAKE 1 TABLET BY MOUTH THREE TIMES DAILY  Dispense: 270 tablet; Refill: 1       Consult Start Time: 07/16/2020 15:57  Consult End Time: 07/16/2020 16:12

## 2020-07-24 ENCOUNTER — PATIENT OUTREACH (OUTPATIENT)
Dept: OTHER | Facility: OTHER | Age: 50
End: 2020-07-24

## 2020-07-24 DIAGNOSIS — I10 ESSENTIAL HYPERTENSION: Primary | ICD-10-CM

## 2020-08-06 ENCOUNTER — PATIENT OUTREACH (OUTPATIENT)
Dept: OTHER | Facility: OTHER | Age: 50
End: 2020-08-06

## 2020-08-06 NOTE — PROGRESS NOTES
Digital Medicine: Health  Follow-Up    The history is provided by the patient.             Reason for review: Blood pressure not at goal        Topics Covered on Call: physical activity, Diet and stress    Additional Follow-up details: Avg BP as of Aug 6, 2020 is 138/83. Trending down.     PCP doubled his diuretic but he's not sure he's seeing a difference yet.     Neck has been bothering him and he's waiting on another CT scan.     Stated that most of his higher readings are from stress, especially from work.         Diet-no change to diet    No change to diet.        Physical Activity-no change to routine  No change to exercise routine.     Medication Adherence-Medication adherence was asssessed.  Patient continue taking medication as prescribed.          Dr doubled his diuretic not too long ago, he didn't notice if it was working but after informing him that his over trend is downwards he thinks it may be due to that.     Substance, Sleep, Stress-change  stress-assessed  Details:work has been stresses him out more with certain things dealing with COVID  Intervention(s):    Sleep-  Details:  Intervention(s):    Alcohol -  Details:  Intervention(s):    Tobacco-  Details:  Intervention(s):          Continue current diet/physical activity routine.       Addressed any questions or concerns and patient has my contact information if needed prior to next outreach. Patient verbalizes understanding.      Explained the importance of self-monitoring and medication adherence. Encouraged the patient to communicate with their health  for lifestyle modifications to help improve or maintain a healthy lifestyle.            There are no preventive care reminders to display for this patient.    Last 5 Patient Entered Readings                                      Current 30 Day Average: 138/83     Recent Readings 7/29/2020 7/29/2020 7/29/2020 7/29/2020 7/26/2020    SBP (mmHg) 127 147 156 153 129    DBP (mmHg) 71 82 79 84 80     Pulse 54 67 69 81 60

## 2020-08-14 ENCOUNTER — PATIENT OUTREACH (OUTPATIENT)
Dept: OTHER | Facility: OTHER | Age: 50
End: 2020-08-14

## 2020-08-14 NOTE — PROGRESS NOTES
Digital Medicine: Clinician Introduction    Sae Santiago is a 50 y.o. male who is newly enrolled in the Digital Medicine Clinic.    Called to welcome patient to [facility] and to introduce myself. Reviewed goals of therapy, medication list/allergies, monitoring requirements and technique with patient.       The history is provided by the patient.             Screenings    Assessment/Plan    There are no preventive care reminders to display for this patient.    Current Medication Regimen:  Hypertension Medications             hydroCHLOROthiazide (HYDRODIURIL) 25 MG tablet Take 1 tablet (25 mg total) by mouth once daily.    losartan (COZAAR) 100 MG tablet TAKE 1 TABLET BY MOUTH ONCE DAILY    metoprolol succinate (TOPROL-XL) 25 MG 24 hr tablet TAKE 1 and 1/2 (ONE & ONE-HALF) TABLETS BY MOUTH AT NIGHT

## 2020-08-14 NOTE — PROGRESS NOTES
Digital Medicine: Clinician Introduction    Sae Santiago is a 50 y.o. male who is newly enrolled in the Digital Medicine Clinic.    Called to welcome patient to [facility] and to introduce myself. Reviewed goals of therapy, medication list/allergies, monitoring requirements and technique with patient. Patient suffers from anxiety and has crises for a long time.    The history is provided by the patient.      Review of patient's allergies indicates:   -- Lexapro (escitalopram oxalate)     --  Serotonin Syndrome ( Pt was seen in the emergency             department)   -- Sulfa (sulfonamide antibiotics)     --  Room spinning with cold sweats  Completed Medication Reconciliation  Verified pharmacy information.    HYPERTENSION  Explained hypertension digital medicine goals including BP goal less than or equal to 130/80mmHg, improved convenience of BP management and reduced risk of heart attack, kidney failure, stroke, eye disease, dementia, and death.     Explained non-pharmacologic therapies like low salt diet and physical activity can reduce blood pressure.       Explained that we expect patient to submit several blood pressure readings per week at random times of the day, but at least 30 minutes after taking blood pressure medications. Instructed patient not to allow anyone else to use their blood pressure monitor and phone as data submitted is directly entered into medical record. Reviewed and confirmed appropriate blood pressure monitoring technique.         Reviewed signs/symptoms of hypertension (headache, changes in vision, chest pain, shortness of breath)   Reviewed signs/symptoms of hypotension (lightheaded, dizziness, weakness)           Last 5 Patient Entered Readings                                      Current 30 Day Average: 139/82     Recent Readings 8/14/2020 8/14/2020 8/14/2020 8/6/2020 8/6/2020    SBP (mmHg) 151 153 155 137 133    DBP (mmHg) 85 83 80 80 76    Pulse 62 76 74 66 67               Depression Screening  Sae Santiago screened positive on the depression screening. He is in treatment for depression and is currently taking medication   suffers from anxiety    Sleep Apnea Screening  Patient not previously diagnosed with ABDIEL       Medication Affordability Screening  Patient did not answer the medication affordability questionnaires. Patient is currently not having problems affording medications    Medication Adherence-Medication adherence was assessed.  Patient continue taking medication as prescribed.            ASSESSMENT(S)  Patients BP average is 139/82 mmHg, which is above goal. Patient's BP goal is less than or equal to 130/80 per 2017 ACC/AHA Hypertension Guidelines.       Hypertension Plan  Continue current therapy. If BP continues to be elevated consider switching HCTZ to chlorthalidone  Continue current diet/physical activity routine. DASH diet  Encouraged lifestyle changes, patient stopped walking and has not been watching Vincent his food.     Addressed any questions or concerns and patient has my contact information if needed prior to next outreach. Patient verbalizes understanding.      Explained the importance of self-monitoring and medication adherence. Encouraged the patient to communicate with their health  for lifestyle modifications to help improve or maintain a healthy lifestyle.        Sent link to [facility] webpages and my contact information via [employer] for future questions.        Explained to the patient that the Digital Medicine team is not available for emergencies. Advised patient call [facility] On Call ([phone] or [phone]) or [phone] if needed.         There are no preventive care reminders to display for this patient.    Current Medication Regimen:  Hypertension Medications             hydroCHLOROthiazide (HYDRODIURIL) 25 MG tablet Take 1 tablet (25 mg total) by mouth once daily.    losartan (COZAAR) 100 MG tablet TAKE 1  TABLET BY MOUTH ONCE DAILY    metoprolol succinate (TOPROL-XL) 25 MG 24 hr tablet TAKE 1 and 1/2 (ONE & ONE-HALF) TABLETS BY MOUTH AT NIGHT

## 2020-08-20 ENCOUNTER — OFFICE VISIT (OUTPATIENT)
Dept: INTERNAL MEDICINE | Facility: CLINIC | Age: 50
End: 2020-08-20
Payer: COMMERCIAL

## 2020-08-20 DIAGNOSIS — G47.30 SLEEP APNEA, UNSPECIFIED TYPE: Primary | ICD-10-CM

## 2020-08-20 PROCEDURE — 99213 PR OFFICE/OUTPT VISIT, EST, LEVL III, 20-29 MIN: ICD-10-PCS | Mod: 95,,, | Performed by: FAMILY MEDICINE

## 2020-08-20 PROCEDURE — 99213 OFFICE O/P EST LOW 20 MIN: CPT | Mod: 95,,, | Performed by: FAMILY MEDICINE

## 2020-08-20 NOTE — PROGRESS NOTES
Subjective:       Patient ID: Sae Santiago is a 50 y.o. male.    Chief Complaint: No chief complaint on file.    The patient location is: Home  The chief complaint leading to consultation is: Sleep Apnea    Visit type: audiovisual    Face to Face time with patient:  minutes of total time spent on the encounter, which includes face to face time and non-face to face time preparing to see the patient (eg, review of tests), Obtaining and/or reviewing separately obtained history, Documenting clinical information in the electronic or other health record, Independently interpreting results (not separately reported) and communicating results to the patient/family/caregiver, or Care coordination (not separately reported).         Each patient to whom he or she provides medical services by telemedicine is:  (1) informed of the relationship between the physician and patient and the respective role of any other health care provider with respect to management of the patient; and (2) notified that he or she may decline to receive medical services by telemedicine and may withdraw from such care at any time.    Notes: Pt is a 50 year old who has snoring. Pt has some brothers who has sleep apnea. Pt epworth study was 12. Pt has felt he wakes himself up at night    Review of Systems   Constitutional: Negative for activity change and unexpected weight change.   HENT: Negative for hearing loss, rhinorrhea and trouble swallowing.    Eyes: Negative for discharge and visual disturbance.   Respiratory: Negative for chest tightness and wheezing.    Cardiovascular: Negative for chest pain and palpitations.   Gastrointestinal: Negative for blood in stool, constipation, diarrhea and vomiting.   Endocrine: Negative for polydipsia and polyuria.   Genitourinary: Negative for difficulty urinating, hematuria and urgency.   Musculoskeletal: Positive for neck pain. Negative for arthralgias and joint swelling.   Neurological: Negative for  weakness and headaches.   Psychiatric/Behavioral: Positive for sleep disturbance. Negative for confusion and dysphoric mood.         Objective:      Physical Exam  Constitutional:       Appearance: Normal appearance.   Neurological:      Mental Status: He is alert.   Psychiatric:         Mood and Affect: Mood normal.         Behavior: Behavior normal.         Assessment:       1. Sleep apnea, unspecified type        Plan:       Sleep apnea, unspecified type  Comments:  [place] study was 12. Will consult Pulmonary and do a home sleep study  Orders:  -     Home Sleep Studies; Future  -     Ambulatory referral/consult to Pulmonology; Future; Expected date: 08/27/2020

## 2020-08-21 ENCOUNTER — TELEPHONE (OUTPATIENT)
Dept: PULMONOLOGY | Facility: CLINIC | Age: 50
End: 2020-08-21

## 2020-08-21 NOTE — TELEPHONE ENCOUNTER
----- Message from Yu oCwan sent at 8/21/2020  9:39 AM CDT -----  [facility]

## 2020-08-21 NOTE — TELEPHONE ENCOUNTER
----- Message from Yu Cowan sent at 8/21/2020  9:39 AM CDT -----  [facility]

## 2020-09-02 ENCOUNTER — PROCEDURE VISIT (OUTPATIENT)
Dept: SLEEP MEDICINE | Facility: CLINIC | Age: 50
End: 2020-09-02
Payer: COMMERCIAL

## 2020-09-02 DIAGNOSIS — G47.30 SLEEP APNEA, UNSPECIFIED TYPE: ICD-10-CM

## 2020-09-02 DIAGNOSIS — G47.33 OSA (OBSTRUCTIVE SLEEP APNEA): Primary | ICD-10-CM

## 2020-09-02 NOTE — Clinical Note
1 night study  MODERATE OBSTRUCTIVE SLEEP APNEA with overall AHI 20.0/hr ( 156 events): night #1  Oxygen desaturation: 82%. SpO2 between 80% to 84% for < 1 min.  Patient snored 100% time above 50 .  Heart rate range: 47 bpm - 76 bpm  REC's:  Please refer to sleep disorders for prompt management  Therapy with APAP at 4-20 cm WP using mask of choice with heated humidification is an option.

## 2020-09-03 ENCOUNTER — PATIENT OUTREACH (OUTPATIENT)
Dept: OTHER | Facility: OTHER | Age: 50
End: 2020-09-03

## 2020-09-03 PROCEDURE — 95806 SLEEP STUDY UNATT&RESP EFFT: CPT | Mod: 26,S$GLB,, | Performed by: INTERNAL MEDICINE

## 2020-09-03 PROCEDURE — 95806 PR SLEEP STUDY, UNATTENDED, SIMUL RECORD HR/O2 SAT/RESP FLOW/RESP EFFT: ICD-10-PCS | Mod: 26,S$GLB,, | Performed by: INTERNAL MEDICINE

## 2020-09-03 NOTE — PROGRESS NOTES
Digital Medicine: Health  Follow-Up    The history is provided by the patient.             Reason for review: Blood pressure not at goal        Topics Covered on Call: physical activity, Diet and sleep study    Additional Follow-up details: Avg BP as of Sept 3, 2020 is 135/79. Trending down.    He states he does take nighttime readings before his medication and retakes after the medication. We discussed how the first medication has probably worn off by this time, so recommended waiting until after medication to get the reading and skipping the one pre-medication.     Did sleep study last week and will meet for results soon.     No other changes noted.         Diet-no change to diet    No change to diet.        Physical Activity-no change to routine  No change to exercise routine.     Medication Adherence-Medication adherence was assessed.        Substance, Sleep, Stress-change  stress-  Details:  Intervention(s):    Sleep-assessed  Details:had sleep study done, will get results soon  Intervention(s):    Alcohol -  Details:  Intervention(s):    Tobacco-  Details:  Intervention(s):          Continue current diet/physical activity routine.  Provided patient education. Readings and medication       Addressed any questions or concerns and patient has my contact information if needed prior to next outreach. Patient verbalizes understanding.      Explained the importance of self-monitoring and medication adherence. Encouraged the patient to communicate with their health  for lifestyle modifications to help improve or maintain a healthy lifestyle.            There are no preventive care reminders to display for this patient.    Last 5 Patient Entered Readings                                      Current 30 Day Average: 135/79     Recent Readings 9/1/2020 8/31/2020 8/31/2020 8/31/2020 8/31/2020    SBP (mmHg) 123 122 122 148 149    DBP (mmHg) 65 70 67 77 79    Pulse 68 60 61 83 83

## 2020-09-03 NOTE — PROCEDURES
Home Sleep Studies    Date/Time: 9/2/2020 8:00 AM  Performed by: Sean Muhammad MD  Authorized by: Floyd Matthews MD       1 night study  MODERATE OBSTRUCTIVE SLEEP APNEA with overall AHI 20.0/hr ( 156 events): night #1  Oxygen desaturation: 82%. SpO2 between 80% to 84% for < 1 min.  Patient snored 100% time above 50 .  Heart rate range: 47 bpm - 76 bpm  REC's:  Please refer to sleep disorders for prompt management  Therapy with APAP at 4-20 cm WP using mask of choice with heated humidification is an option.

## 2020-09-09 ENCOUNTER — IMMUNIZATION (OUTPATIENT)
Dept: PHARMACY | Facility: CLINIC | Age: 50
End: 2020-09-09
Payer: COMMERCIAL

## 2020-09-09 ENCOUNTER — HOSPITAL ENCOUNTER (OUTPATIENT)
Dept: RADIOLOGY | Facility: HOSPITAL | Age: 50
Discharge: HOME | End: 2020-09-09
Attending: NEUROLOGICAL SURGERY
Payer: COMMERCIAL

## 2020-09-09 DIAGNOSIS — Z98.1 S/P CERVICAL SPINAL FUSION: ICD-10-CM

## 2020-09-09 DIAGNOSIS — M47.892 OTHER SPONDYLOSIS, CERVICAL REGION: ICD-10-CM

## 2020-09-09 PROCEDURE — 72125 CT NECK SPINE W/O DYE: CPT | Mod: 26,,, | Performed by: RADIOLOGY

## 2020-09-09 PROCEDURE — 72125 CT CERVICAL SPINE WITHOUT CONTRAST: ICD-10-PCS | Mod: 26,,, | Performed by: RADIOLOGY

## 2020-09-09 PROCEDURE — 72125 CT NECK SPINE W/O DYE: CPT | Mod: TC

## 2020-09-21 ENCOUNTER — PATIENT MESSAGE (OUTPATIENT)
Dept: NEUROSURGERY | Facility: CLINIC | Age: 50
End: 2020-09-21

## 2020-09-29 ENCOUNTER — PATIENT OUTREACH (OUTPATIENT)
Dept: ADMINISTRATIVE | Facility: OTHER | Age: 50
End: 2020-09-29

## 2020-09-30 ENCOUNTER — OFFICE VISIT (OUTPATIENT)
Dept: NEUROSURGERY | Facility: CLINIC | Age: 50
End: 2020-09-30
Payer: COMMERCIAL

## 2020-09-30 VITALS
RESPIRATION RATE: 17 BRPM | HEIGHT: 73 IN | SYSTOLIC BLOOD PRESSURE: 160 MMHG | BODY MASS INDEX: 32.08 KG/M2 | DIASTOLIC BLOOD PRESSURE: 81 MMHG | WEIGHT: 242.06 LBS | HEART RATE: 79 BPM

## 2020-09-30 DIAGNOSIS — M50.30 DEGENERATIVE DISC DISEASE, CERVICAL: ICD-10-CM

## 2020-09-30 DIAGNOSIS — M47.892 OTHER SPONDYLOSIS, CERVICAL REGION: ICD-10-CM

## 2020-09-30 DIAGNOSIS — Z98.1 S/P CERVICAL SPINAL FUSION: Primary | ICD-10-CM

## 2020-09-30 PROCEDURE — 3079F DIAST BP 80-89 MM HG: CPT | Mod: CPTII,S$GLB,, | Performed by: NEUROLOGICAL SURGERY

## 2020-09-30 PROCEDURE — 99999 PR PBB SHADOW E&M-EST. PATIENT-LVL III: ICD-10-PCS | Mod: PBBFAC,,, | Performed by: NEUROLOGICAL SURGERY

## 2020-09-30 PROCEDURE — 3077F SYST BP >= 140 MM HG: CPT | Mod: CPTII,S$GLB,, | Performed by: NEUROLOGICAL SURGERY

## 2020-09-30 PROCEDURE — 99213 OFFICE O/P EST LOW 20 MIN: CPT | Mod: S$GLB,,, | Performed by: NEUROLOGICAL SURGERY

## 2020-09-30 PROCEDURE — 3008F PR BODY MASS INDEX (BMI) DOCUMENTED: ICD-10-PCS | Mod: CPTII,S$GLB,, | Performed by: NEUROLOGICAL SURGERY

## 2020-09-30 PROCEDURE — 3077F PR MOST RECENT SYSTOLIC BLOOD PRESSURE >= 140 MM HG: ICD-10-PCS | Mod: CPTII,S$GLB,, | Performed by: NEUROLOGICAL SURGERY

## 2020-09-30 PROCEDURE — 99999 PR PBB SHADOW E&M-EST. PATIENT-LVL III: CPT | Mod: PBBFAC,,, | Performed by: NEUROLOGICAL SURGERY

## 2020-09-30 PROCEDURE — 3008F BODY MASS INDEX DOCD: CPT | Mod: CPTII,S$GLB,, | Performed by: NEUROLOGICAL SURGERY

## 2020-09-30 PROCEDURE — 99213 PR OFFICE/OUTPT VISIT, EST, LEVL III, 20-29 MIN: ICD-10-PCS | Mod: S$GLB,,, | Performed by: NEUROLOGICAL SURGERY

## 2020-09-30 PROCEDURE — 3079F PR MOST RECENT DIASTOLIC BLOOD PRESSURE 80-89 MM HG: ICD-10-PCS | Mod: CPTII,S$GLB,, | Performed by: NEUROLOGICAL SURGERY

## 2020-10-01 ENCOUNTER — PATIENT OUTREACH (OUTPATIENT)
Dept: OTHER | Facility: OTHER | Age: 50
End: 2020-10-01

## 2020-10-13 ENCOUNTER — IMMUNIZATION (OUTPATIENT)
Dept: PHARMACY | Facility: CLINIC | Age: 50
End: 2020-10-13
Payer: COMMERCIAL

## 2020-11-12 NOTE — PROGRESS NOTES
Digital Medicine: Health  Follow-Up    The history is provided by the patient.             Reason for review: Blood pressure at goal        Topics Covered on Call: physical activity and Diet    Additional Follow-up details: Avg BP as of Nov 12, 2020 is 122/71.     He started taking his readings in the evening when medication is still in his system.     Hasn't been using his CPAP anymore, reports getting great sleep.     Notes no change to anything else            Diet-no change to diet    No change to diet.        Physical Activity-no change to routine  No change to exercise routine.     Medication Adherence-Medication Adherence not addressed.        Substance, Sleep, Stress-No change  stress-  Details:  Intervention(s):    Sleep-  Details:  Intervention(s):    Alcohol -  Details:  Intervention(s):    Tobacco-  Details:  Intervention(s):          Continue current diet/physical activity routine.       Addressed patient questions and patient has my contact information if needed prior to next outreach. Patient verbalizes understanding.             There are no preventive care reminders to display for this patient.      Last 5 Patient Entered Readings                                      Current 30 Day Average: 122/71     Recent Readings 11/6/2020 11/6/2020 10/19/2020 10/19/2020 10/7/2020    SBP (mmHg) 119 127 124 126 117    DBP (mmHg) 66 77 66 74 67    Pulse 52 53 58 63 63

## 2020-11-16 NOTE — PROGRESS NOTES
Digital Medicine: Clinician Follow-Up    Called patient for routine follow up on blood pressure. Patient reports adherence to medication regimen daily and denies missed doses. Patient denies hypotensive s/s (lightheadedness, dizziness, nausea, fatigue); patient denies hypertensive s/s (SOB, CP, severe headaches, changes in vision, dizziness, fatigue).        The history is provided by the patient.      Review of patient's allergies indicates:   -- Lexapro (escitalopram oxalate)     --  Serotonin Syndrome ( Pt was seen in the emergency             department)   -- Sulfa (sulfonamide antibiotics)     --  Room spinning with cold sweats  Completed Medication Reconciliation    Follow-up reason(s): routine follow up.     Hypertension    Readings are trending down due to medication adherence.       Patient is not experiencing signs/symptoms of hypotension.  Patient is not experiencing signs/symptoms of hypertension.            Last 5 Patient Entered Readings                                      Current 30 Day Average: 122/71     Recent Readings 11/6/2020 11/6/2020 10/19/2020 10/19/2020 10/7/2020    SBP (mmHg) 119 127 124 126 117    DBP (mmHg) 66 77 66 74 67    Pulse 52 53 58 63 63                    ASSESSMENT(S)  Patients BP average is 122/71 mmHg, which is at goal. Patient's BP goal is less than or equal to 130/80.    Hypertension Plan  Continue current therapy.  Continue current diet/physical activity routine.       Addressed patient questions and patient has my contact information if needed prior to next outreach. Patient verbalizes understanding.      Explained to the patient that the Digital Medicine team is not available for emergencies. Advised patient call [facility] On Call ([phone] or [phone]) or [phone] if needed.            There are no preventive care reminders to display for this patient.  There are no preventive care reminders to display for this patient.      Hypertension Medications              hydroCHLOROthiazide (HYDRODIURIL) 25 MG tablet Take 1 tablet (25 mg total) by mouth once daily.    losartan (COZAAR) 100 MG tablet TAKE 1 TABLET BY MOUTH ONCE DAILY    metoprolol succinate (TOPROL-XL) 25 MG 24 hr tablet TAKE 1 and 1/2 (ONE & ONE-HALF) TABLETS BY MOUTH AT NIGHT

## 2020-11-24 ENCOUNTER — PATIENT MESSAGE (OUTPATIENT)
Dept: INTERNAL MEDICINE | Facility: CLINIC | Age: 50
End: 2020-11-24

## 2020-11-24 RX ORDER — ATORVASTATIN CALCIUM 40 MG/1
40 TABLET, FILM COATED ORAL DAILY
Qty: 30 TABLET | Refills: 0 | Status: SHIPPED | OUTPATIENT
Start: 2020-11-24 | End: 2021-02-25

## 2020-12-10 ENCOUNTER — LAB VISIT (OUTPATIENT)
Dept: PRIMARY CARE CLINIC | Facility: OTHER | Age: 50
End: 2020-12-10
Attending: INTERNAL MEDICINE
Payer: COMMERCIAL

## 2020-12-10 DIAGNOSIS — Z03.818 ENCOUNTER FOR OBSERVATION FOR SUSPECTED EXPOSURE TO OTHER BIOLOGICAL AGENTS RULED OUT: Primary | ICD-10-CM

## 2020-12-10 PROCEDURE — U0003 INFECTIOUS AGENT DETECTION BY NUCLEIC ACID (DNA OR RNA); SEVERE ACUTE RESPIRATORY SYNDROME CORONAVIRUS 2 (SARS-COV-2) (CORONAVIRUS DISEASE [COVID-19]), AMPLIFIED PROBE TECHNIQUE, MAKING USE OF HIGH THROUGHPUT TECHNOLOGIES AS DESCRIBED BY CMS-2020-01-R: HCPCS

## 2020-12-11 LAB — SARS-COV-2 RNA RESP QL NAA+PROBE: NOT DETECTED

## 2020-12-23 ENCOUNTER — PATIENT OUTREACH (OUTPATIENT)
Dept: OTHER | Facility: OTHER | Age: 50
End: 2020-12-23

## 2020-12-31 ENCOUNTER — PATIENT MESSAGE (OUTPATIENT)
Dept: NEUROSURGERY | Facility: CLINIC | Age: 50
End: 2020-12-31

## 2021-01-25 ENCOUNTER — PATIENT OUTREACH (OUTPATIENT)
Dept: ADMINISTRATIVE | Facility: HOSPITAL | Age: 51
End: 2021-01-25

## 2021-01-29 ENCOUNTER — HOSPITAL ENCOUNTER (OUTPATIENT)
Dept: RADIOLOGY | Facility: HOSPITAL | Age: 51
Discharge: HOME | End: 2021-01-29
Attending: NEUROLOGICAL SURGERY
Payer: COMMERCIAL

## 2021-01-29 DIAGNOSIS — M47.892 OTHER SPONDYLOSIS, CERVICAL REGION: ICD-10-CM

## 2021-01-29 PROCEDURE — 72125 CT NECK SPINE W/O DYE: CPT | Mod: TC

## 2021-01-29 PROCEDURE — 72125 CT CERVICAL SPINE WITHOUT CONTRAST: ICD-10-PCS | Mod: 26,,, | Performed by: RADIOLOGY

## 2021-01-29 PROCEDURE — 72125 CT NECK SPINE W/O DYE: CPT | Mod: 26,,, | Performed by: RADIOLOGY

## 2021-02-02 ENCOUNTER — OFFICE VISIT (OUTPATIENT)
Dept: NEUROSURGERY | Facility: CLINIC | Age: 51
End: 2021-02-02
Payer: COMMERCIAL

## 2021-02-02 VITALS
WEIGHT: 243.19 LBS | DIASTOLIC BLOOD PRESSURE: 84 MMHG | RESPIRATION RATE: 16 BRPM | BODY MASS INDEX: 32.23 KG/M2 | HEIGHT: 73 IN | HEART RATE: 84 BPM | SYSTOLIC BLOOD PRESSURE: 158 MMHG

## 2021-02-02 DIAGNOSIS — M50.30 DEGENERATIVE DISC DISEASE, CERVICAL: Primary | ICD-10-CM

## 2021-02-02 DIAGNOSIS — Z98.1 S/P CERVICAL SPINAL FUSION: ICD-10-CM

## 2021-02-02 DIAGNOSIS — M47.892 OTHER SPONDYLOSIS, CERVICAL REGION: ICD-10-CM

## 2021-02-02 PROCEDURE — 99213 PR OFFICE/OUTPT VISIT, EST, LEVL III, 20-29 MIN: ICD-10-PCS | Mod: S$GLB,,, | Performed by: NEUROLOGICAL SURGERY

## 2021-02-02 PROCEDURE — 3008F PR BODY MASS INDEX (BMI) DOCUMENTED: ICD-10-PCS | Mod: CPTII,S$GLB,, | Performed by: NEUROLOGICAL SURGERY

## 2021-02-02 PROCEDURE — 3008F BODY MASS INDEX DOCD: CPT | Mod: CPTII,S$GLB,, | Performed by: NEUROLOGICAL SURGERY

## 2021-02-02 PROCEDURE — 3077F SYST BP >= 140 MM HG: CPT | Mod: CPTII,S$GLB,, | Performed by: NEUROLOGICAL SURGERY

## 2021-02-02 PROCEDURE — 1126F PR PAIN SEVERITY QUANTIFIED, NO PAIN PRESENT: ICD-10-PCS | Mod: S$GLB,,, | Performed by: NEUROLOGICAL SURGERY

## 2021-02-02 PROCEDURE — 1126F AMNT PAIN NOTED NONE PRSNT: CPT | Mod: S$GLB,,, | Performed by: NEUROLOGICAL SURGERY

## 2021-02-02 PROCEDURE — 99999 PR PBB SHADOW E&M-EST. PATIENT-LVL IV: CPT | Mod: PBBFAC,,, | Performed by: NEUROLOGICAL SURGERY

## 2021-02-02 PROCEDURE — 3079F DIAST BP 80-89 MM HG: CPT | Mod: CPTII,S$GLB,, | Performed by: NEUROLOGICAL SURGERY

## 2021-02-02 PROCEDURE — 99213 OFFICE O/P EST LOW 20 MIN: CPT | Mod: S$GLB,,, | Performed by: NEUROLOGICAL SURGERY

## 2021-02-02 PROCEDURE — 3077F PR MOST RECENT SYSTOLIC BLOOD PRESSURE >= 140 MM HG: ICD-10-PCS | Mod: CPTII,S$GLB,, | Performed by: NEUROLOGICAL SURGERY

## 2021-02-02 PROCEDURE — 3079F PR MOST RECENT DIASTOLIC BLOOD PRESSURE 80-89 MM HG: ICD-10-PCS | Mod: CPTII,S$GLB,, | Performed by: NEUROLOGICAL SURGERY

## 2021-02-02 PROCEDURE — 99999 PR PBB SHADOW E&M-EST. PATIENT-LVL IV: ICD-10-PCS | Mod: PBBFAC,,, | Performed by: NEUROLOGICAL SURGERY

## 2021-02-02 RX ORDER — AZELASTINE 1 MG/ML
SPRAY, METERED NASAL
COMMUNITY
Start: 2020-12-12 | End: 2021-02-25

## 2021-02-02 RX ORDER — AZITHROMYCIN 250 MG/1
TABLET, FILM COATED ORAL
COMMUNITY
Start: 2020-12-12 | End: 2021-02-25 | Stop reason: ALTCHOICE

## 2021-02-25 ENCOUNTER — PATIENT MESSAGE (OUTPATIENT)
Dept: INTERNAL MEDICINE | Facility: CLINIC | Age: 51
End: 2021-02-25

## 2021-02-25 ENCOUNTER — OFFICE VISIT (OUTPATIENT)
Dept: INTERNAL MEDICINE | Facility: CLINIC | Age: 51
End: 2021-02-25
Payer: COMMERCIAL

## 2021-02-25 VITALS
WEIGHT: 243.81 LBS | SYSTOLIC BLOOD PRESSURE: 130 MMHG | TEMPERATURE: 98 F | BODY MASS INDEX: 32.17 KG/M2 | HEART RATE: 87 BPM | OXYGEN SATURATION: 97 % | DIASTOLIC BLOOD PRESSURE: 86 MMHG

## 2021-02-25 DIAGNOSIS — E78.5 HYPERLIPIDEMIA, UNSPECIFIED HYPERLIPIDEMIA TYPE: ICD-10-CM

## 2021-02-25 DIAGNOSIS — I10 ESSENTIAL HYPERTENSION: ICD-10-CM

## 2021-02-25 DIAGNOSIS — F41.9 ANXIETY: ICD-10-CM

## 2021-02-25 DIAGNOSIS — Z00.00 ROUTINE GENERAL MEDICAL EXAMINATION AT A HEALTH CARE FACILITY: Primary | ICD-10-CM

## 2021-02-25 DIAGNOSIS — Z12.11 COLON CANCER SCREENING: ICD-10-CM

## 2021-02-25 PROCEDURE — 1126F AMNT PAIN NOTED NONE PRSNT: CPT | Mod: S$GLB,,, | Performed by: INTERNAL MEDICINE

## 2021-02-25 PROCEDURE — 3008F BODY MASS INDEX DOCD: CPT | Mod: CPTII,S$GLB,, | Performed by: INTERNAL MEDICINE

## 2021-02-25 PROCEDURE — 3008F PR BODY MASS INDEX (BMI) DOCUMENTED: ICD-10-PCS | Mod: CPTII,S$GLB,, | Performed by: INTERNAL MEDICINE

## 2021-02-25 PROCEDURE — 3075F PR MOST RECENT SYSTOLIC BLOOD PRESS GE 130-139MM HG: ICD-10-PCS | Mod: CPTII,S$GLB,, | Performed by: INTERNAL MEDICINE

## 2021-02-25 PROCEDURE — 99999 PR PBB SHADOW E&M-EST. PATIENT-LVL IV: CPT | Mod: PBBFAC,,, | Performed by: INTERNAL MEDICINE

## 2021-02-25 PROCEDURE — 3075F SYST BP GE 130 - 139MM HG: CPT | Mod: CPTII,S$GLB,, | Performed by: INTERNAL MEDICINE

## 2021-02-25 PROCEDURE — 99999 PR PBB SHADOW E&M-EST. PATIENT-LVL IV: ICD-10-PCS | Mod: PBBFAC,,, | Performed by: INTERNAL MEDICINE

## 2021-02-25 PROCEDURE — 3079F PR MOST RECENT DIASTOLIC BLOOD PRESSURE 80-89 MM HG: ICD-10-PCS | Mod: CPTII,S$GLB,, | Performed by: INTERNAL MEDICINE

## 2021-02-25 PROCEDURE — 3079F DIAST BP 80-89 MM HG: CPT | Mod: CPTII,S$GLB,, | Performed by: INTERNAL MEDICINE

## 2021-02-25 PROCEDURE — 99396 PREV VISIT EST AGE 40-64: CPT | Mod: S$GLB,,, | Performed by: INTERNAL MEDICINE

## 2021-02-25 PROCEDURE — 1126F PR PAIN SEVERITY QUANTIFIED, NO PAIN PRESENT: ICD-10-PCS | Mod: S$GLB,,, | Performed by: INTERNAL MEDICINE

## 2021-02-25 PROCEDURE — 99396 PR PREVENTIVE VISIT,EST,40-64: ICD-10-PCS | Mod: S$GLB,,, | Performed by: INTERNAL MEDICINE

## 2021-02-25 RX ORDER — LOSARTAN POTASSIUM 100 MG/1
TABLET ORAL
Qty: 90 TABLET | Refills: 1 | Status: SHIPPED | OUTPATIENT
Start: 2021-02-25 | End: 2021-07-19 | Stop reason: SDUPTHER

## 2021-02-25 RX ORDER — METOPROLOL SUCCINATE 25 MG/1
TABLET, EXTENDED RELEASE ORAL
Qty: 135 TABLET | Refills: 1 | Status: SHIPPED | OUTPATIENT
Start: 2021-02-25 | End: 2021-07-19 | Stop reason: SDUPTHER

## 2021-02-25 RX ORDER — BUSPIRONE HYDROCHLORIDE 5 MG/1
5 TABLET ORAL 3 TIMES DAILY
Qty: 270 TABLET | Refills: 3 | Status: SHIPPED | OUTPATIENT
Start: 2021-02-25 | End: 2021-07-19 | Stop reason: SDUPTHER

## 2021-02-25 RX ORDER — ATORVASTATIN CALCIUM 40 MG/1
40 TABLET, FILM COATED ORAL DAILY
Qty: 90 TABLET | Refills: 0 | Status: SHIPPED | OUTPATIENT
Start: 2021-02-25 | End: 2021-07-19 | Stop reason: SDUPTHER

## 2021-02-25 RX ORDER — BUSPIRONE HYDROCHLORIDE 5 MG/1
5 TABLET ORAL 2 TIMES DAILY
Qty: 180 TABLET | Refills: 0 | Status: SHIPPED | OUTPATIENT
Start: 2021-02-25 | End: 2021-02-25 | Stop reason: SDUPTHER

## 2021-02-25 RX ORDER — HYDROCHLOROTHIAZIDE 25 MG/1
25 TABLET ORAL DAILY
Qty: 90 TABLET | Refills: 1 | Status: SHIPPED | OUTPATIENT
Start: 2021-02-25 | End: 2021-07-19 | Stop reason: SDUPTHER

## 2021-02-26 ENCOUNTER — TELEPHONE (OUTPATIENT)
Dept: ENDOSCOPY | Facility: HOSPITAL | Age: 51
End: 2021-02-26

## 2021-02-26 ENCOUNTER — LAB VISIT (OUTPATIENT)
Dept: LAB | Facility: HOSPITAL | Age: 51
End: 2021-02-26
Attending: INTERNAL MEDICINE
Payer: COMMERCIAL

## 2021-02-26 DIAGNOSIS — Z00.00 ROUTINE GENERAL MEDICAL EXAMINATION AT A HEALTH CARE FACILITY: ICD-10-CM

## 2021-02-26 LAB
BASOPHILS # BLD AUTO: 0.03 K/UL (ref 0–0.2)
BASOPHILS NFR BLD: 0.6 % (ref 0–1.9)
DIFFERENTIAL METHOD: NORMAL
EOSINOPHIL # BLD AUTO: 0.1 K/UL (ref 0–0.5)
EOSINOPHIL NFR BLD: 1.3 % (ref 0–8)
ERYTHROCYTE [DISTWIDTH] IN BLOOD BY AUTOMATED COUNT: 12.8 % (ref 11.5–14.5)
ESTIMATED AVG GLUCOSE: 120 MG/DL (ref 68–131)
HBA1C MFR BLD: 5.8 % (ref 4–5.6)
HCT VFR BLD AUTO: 48.1 % (ref 40–54)
HGB BLD-MCNC: 15.5 G/DL (ref 14–18)
IMM GRANULOCYTES # BLD AUTO: 0.01 K/UL (ref 0–0.04)
IMM GRANULOCYTES NFR BLD AUTO: 0.2 % (ref 0–0.5)
LYMPHOCYTES # BLD AUTO: 1.6 K/UL (ref 1–4.8)
LYMPHOCYTES NFR BLD: 28.7 % (ref 18–48)
MCH RBC QN AUTO: 27.2 PG (ref 27–31)
MCHC RBC AUTO-ENTMCNC: 32.2 G/DL (ref 32–36)
MCV RBC AUTO: 84 FL (ref 82–98)
MONOCYTES # BLD AUTO: 0.4 K/UL (ref 0.3–1)
MONOCYTES NFR BLD: 7.5 % (ref 4–15)
NEUTROPHILS # BLD AUTO: 3.4 K/UL (ref 1.8–7.7)
NEUTROPHILS NFR BLD: 61.7 % (ref 38–73)
NRBC BLD-RTO: 0 /100 WBC
PLATELET # BLD AUTO: 238 K/UL (ref 150–350)
PMV BLD AUTO: 10 FL (ref 9.2–12.9)
RBC # BLD AUTO: 5.7 M/UL (ref 4.6–6.2)
WBC # BLD AUTO: 5.44 K/UL (ref 3.9–12.7)

## 2021-02-26 PROCEDURE — 84153 ASSAY OF PSA TOTAL: CPT

## 2021-02-26 PROCEDURE — 83036 HEMOGLOBIN GLYCOSYLATED A1C: CPT

## 2021-02-26 PROCEDURE — 84550 ASSAY OF BLOOD/URIC ACID: CPT

## 2021-02-26 PROCEDURE — 80053 COMPREHEN METABOLIC PANEL: CPT

## 2021-02-26 PROCEDURE — 80061 LIPID PANEL: CPT

## 2021-02-26 PROCEDURE — 85025 COMPLETE CBC W/AUTO DIFF WBC: CPT

## 2021-02-26 PROCEDURE — 36415 COLL VENOUS BLD VENIPUNCTURE: CPT

## 2021-02-26 PROCEDURE — 84443 ASSAY THYROID STIM HORMONE: CPT

## 2021-02-27 LAB
ALBUMIN SERPL BCP-MCNC: 4.3 G/DL (ref 3.5–5.2)
ALP SERPL-CCNC: 77 U/L (ref 55–135)
ALT SERPL W/O P-5'-P-CCNC: 27 U/L (ref 10–44)
ANION GAP SERPL CALC-SCNC: 15 MMOL/L (ref 8–16)
AST SERPL-CCNC: 22 U/L (ref 10–40)
BILIRUB SERPL-MCNC: 0.7 MG/DL (ref 0.1–1)
BUN SERPL-MCNC: 21 MG/DL (ref 6–20)
CALCIUM SERPL-MCNC: 10 MG/DL (ref 8.7–10.5)
CHLORIDE SERPL-SCNC: 102 MMOL/L (ref 95–110)
CHOLEST SERPL-MCNC: 164 MG/DL (ref 120–199)
CHOLEST/HDLC SERPL: 4.6 {RATIO} (ref 2–5)
CO2 SERPL-SCNC: 24 MMOL/L (ref 23–29)
COMPLEXED PSA SERPL-MCNC: 1.1 NG/ML (ref 0–4)
CREAT SERPL-MCNC: 1.1 MG/DL (ref 0.5–1.4)
EST. GFR  (AFRICAN AMERICAN): >60 ML/MIN/1.73 M^2
EST. GFR  (NON AFRICAN AMERICAN): >60 ML/MIN/1.73 M^2
GLUCOSE SERPL-MCNC: 121 MG/DL (ref 70–110)
HDLC SERPL-MCNC: 36 MG/DL (ref 40–75)
HDLC SERPL: 22 % (ref 20–50)
LDLC SERPL CALC-MCNC: 87.6 MG/DL (ref 63–159)
NONHDLC SERPL-MCNC: 128 MG/DL
POTASSIUM SERPL-SCNC: 4.5 MMOL/L (ref 3.5–5.1)
PROT SERPL-MCNC: 7 G/DL (ref 6–8.4)
SODIUM SERPL-SCNC: 141 MMOL/L (ref 136–145)
TRIGL SERPL-MCNC: 202 MG/DL (ref 30–150)
TSH SERPL DL<=0.005 MIU/L-ACNC: 0.6 UIU/ML (ref 0.4–4)
URATE SERPL-MCNC: 8.1 MG/DL (ref 3.4–7)

## 2021-03-04 ENCOUNTER — PATIENT MESSAGE (OUTPATIENT)
Dept: INTERNAL MEDICINE | Facility: CLINIC | Age: 51
End: 2021-03-04

## 2021-03-10 ENCOUNTER — PATIENT MESSAGE (OUTPATIENT)
Dept: INTERNAL MEDICINE | Facility: CLINIC | Age: 51
End: 2021-03-10

## 2021-04-11 NOTE — PROGRESS NOTES
Subjective:       Patient ID: Sae Santiago is a 49 y.o. male.    Chief Complaint: Annual Exam    Annual exam:      Pt is a 49 year old who is here for annual exam. Pt is being worked up for cervical neck issues. Pt has lost some weight.     Review of Systems   Constitutional: Negative.    HENT: Negative.    Respiratory: Negative.    Cardiovascular: Negative.    Gastrointestinal: Negative.    Skin: Negative.    Neurological: Negative.    Psychiatric/Behavioral: Negative.        Objective:      Physical Exam   Constitutional: He is oriented to person, place, and time. He appears well-developed and well-nourished.   HENT:   Head: Normocephalic.   Eyes: Pupils are equal, round, and reactive to light. EOM are normal.   Neck: Normal range of motion. Neck supple. No JVD present. No thyromegaly present.   Cardiovascular: Normal rate and regular rhythm.   Pulmonary/Chest: Effort normal and breath sounds normal.   Abdominal: Soft. Bowel sounds are normal.   Musculoskeletal: Normal range of motion.   Lymphadenopathy:     He has no cervical adenopathy.   Neurological: He is alert and oriented to person, place, and time. He has normal reflexes.   Skin: Skin is warm and dry.   Psychiatric: He has a normal mood and affect. His behavior is normal.       Assessment:       1. Annual physical exam        Plan:       Annual physical exam  Comments:  Will do CBC, CMP and lipid  Orders:  -     CBC auto differential; Future; Expected date: 12/17/2019  -     Comprehensive metabolic panel; Future; Expected date: 12/17/2019  -     Lipid panel; Future; Expected date: 12/17/2019          Other

## 2021-04-14 NOTE — PROGRESS NOTES
Patient has not submitted any recent data for HTN Digital Medicine. Will follow up once more data is available to review.      Last 5 Patient Entered Readings                Current 30 Day Average:   Recent Readings 3/10/2021 3/4/2021 2/24/2021 2/21/2021 2/9/2021   SBP (mmHg) 118 116 122 144 119   DBP (mmHg) 70 65 73 80 65   Pulse 53 56 54 60 57

## 2021-07-19 ENCOUNTER — OFFICE VISIT (OUTPATIENT)
Dept: INTERNAL MEDICINE | Facility: CLINIC | Age: 51
End: 2021-07-19
Payer: COMMERCIAL

## 2021-07-19 VITALS
WEIGHT: 242.75 LBS | DIASTOLIC BLOOD PRESSURE: 90 MMHG | HEART RATE: 80 BPM | OXYGEN SATURATION: 97 % | TEMPERATURE: 99 F | BODY MASS INDEX: 32.17 KG/M2 | SYSTOLIC BLOOD PRESSURE: 138 MMHG | HEIGHT: 73 IN

## 2021-07-19 DIAGNOSIS — Z12.11 SCREEN FOR COLON CANCER: ICD-10-CM

## 2021-07-19 DIAGNOSIS — E78.5 HYPERLIPIDEMIA, UNSPECIFIED HYPERLIPIDEMIA TYPE: ICD-10-CM

## 2021-07-19 DIAGNOSIS — F41.1 GENERALIZED ANXIETY DISORDER: ICD-10-CM

## 2021-07-19 DIAGNOSIS — R16.2 HEPATOSPLENOMEGALY: ICD-10-CM

## 2021-07-19 DIAGNOSIS — M54.50 ACUTE BILATERAL LOW BACK PAIN WITHOUT SCIATICA: Primary | ICD-10-CM

## 2021-07-19 DIAGNOSIS — F41.9 ANXIETY: ICD-10-CM

## 2021-07-19 DIAGNOSIS — I10 WHITE COAT SYNDROME WITH DIAGNOSIS OF HYPERTENSION: ICD-10-CM

## 2021-07-19 PROCEDURE — 3080F PR MOST RECENT DIASTOLIC BLOOD PRESSURE >= 90 MM HG: ICD-10-PCS | Mod: CPTII,S$GLB,, | Performed by: PHYSICIAN ASSISTANT

## 2021-07-19 PROCEDURE — 3008F PR BODY MASS INDEX (BMI) DOCUMENTED: ICD-10-PCS | Mod: CPTII,S$GLB,, | Performed by: PHYSICIAN ASSISTANT

## 2021-07-19 PROCEDURE — 99214 OFFICE O/P EST MOD 30 MIN: CPT | Mod: 25,S$GLB,, | Performed by: PHYSICIAN ASSISTANT

## 2021-07-19 PROCEDURE — 1125F PR PAIN SEVERITY QUANTIFIED, PAIN PRESENT: ICD-10-PCS | Mod: CPTII,S$GLB,, | Performed by: PHYSICIAN ASSISTANT

## 2021-07-19 PROCEDURE — 96372 PR INJECTION,THERAP/PROPH/DIAG2ST, IM OR SUBCUT: ICD-10-PCS | Mod: S$GLB,,, | Performed by: PHYSICIAN ASSISTANT

## 2021-07-19 PROCEDURE — 3008F BODY MASS INDEX DOCD: CPT | Mod: CPTII,S$GLB,, | Performed by: PHYSICIAN ASSISTANT

## 2021-07-19 PROCEDURE — 96372 THER/PROPH/DIAG INJ SC/IM: CPT | Mod: S$GLB,,, | Performed by: PHYSICIAN ASSISTANT

## 2021-07-19 PROCEDURE — 3080F DIAST BP >= 90 MM HG: CPT | Mod: CPTII,S$GLB,, | Performed by: PHYSICIAN ASSISTANT

## 2021-07-19 PROCEDURE — 1125F AMNT PAIN NOTED PAIN PRSNT: CPT | Mod: CPTII,S$GLB,, | Performed by: PHYSICIAN ASSISTANT

## 2021-07-19 PROCEDURE — 3075F SYST BP GE 130 - 139MM HG: CPT | Mod: CPTII,S$GLB,, | Performed by: PHYSICIAN ASSISTANT

## 2021-07-19 PROCEDURE — 99214 PR OFFICE/OUTPT VISIT, EST, LEVL IV, 30-39 MIN: ICD-10-PCS | Mod: 25,S$GLB,, | Performed by: PHYSICIAN ASSISTANT

## 2021-07-19 PROCEDURE — 3075F PR MOST RECENT SYSTOLIC BLOOD PRESS GE 130-139MM HG: ICD-10-PCS | Mod: CPTII,S$GLB,, | Performed by: PHYSICIAN ASSISTANT

## 2021-07-19 PROCEDURE — 99999 PR PBB SHADOW E&M-EST. PATIENT-LVL V: ICD-10-PCS | Mod: PBBFAC,,, | Performed by: PHYSICIAN ASSISTANT

## 2021-07-19 PROCEDURE — 99999 PR PBB SHADOW E&M-EST. PATIENT-LVL V: CPT | Mod: PBBFAC,,, | Performed by: PHYSICIAN ASSISTANT

## 2021-07-19 RX ORDER — HYDROCHLOROTHIAZIDE 25 MG/1
25 TABLET ORAL DAILY
Qty: 90 TABLET | Refills: 3 | Status: SHIPPED | OUTPATIENT
Start: 2021-07-19 | End: 2022-09-19 | Stop reason: SDUPTHER

## 2021-07-19 RX ORDER — METOPROLOL SUCCINATE 25 MG/1
TABLET, EXTENDED RELEASE ORAL
Qty: 135 TABLET | Refills: 3 | Status: SHIPPED | OUTPATIENT
Start: 2021-07-19 | End: 2022-08-11 | Stop reason: SDUPTHER

## 2021-07-19 RX ORDER — LOSARTAN POTASSIUM 100 MG/1
TABLET ORAL
Qty: 90 TABLET | Refills: 3 | Status: SHIPPED | OUTPATIENT
Start: 2021-07-19 | End: 2022-08-11 | Stop reason: SDUPTHER

## 2021-07-19 RX ORDER — KETOROLAC TROMETHAMINE 30 MG/ML
60 INJECTION, SOLUTION INTRAMUSCULAR; INTRAVENOUS
Status: COMPLETED | OUTPATIENT
Start: 2021-07-19 | End: 2021-07-19

## 2021-07-19 RX ORDER — BUSPIRONE HYDROCHLORIDE 5 MG/1
5 TABLET ORAL 3 TIMES DAILY
Qty: 270 TABLET | Refills: 3 | Status: SHIPPED | OUTPATIENT
Start: 2021-07-19 | End: 2022-08-11 | Stop reason: SDUPTHER

## 2021-07-19 RX ORDER — LORAZEPAM 0.5 MG/1
0.5 TABLET ORAL
Qty: 30 TABLET | Refills: 0 | Status: SHIPPED | OUTPATIENT
Start: 2021-07-19 | End: 2022-11-10 | Stop reason: SDUPTHER

## 2021-07-19 RX ORDER — ATORVASTATIN CALCIUM 40 MG/1
40 TABLET, FILM COATED ORAL DAILY
Qty: 90 TABLET | Refills: 3 | Status: SHIPPED | OUTPATIENT
Start: 2021-07-19 | End: 2022-04-29

## 2021-07-19 RX ADMIN — KETOROLAC TROMETHAMINE 60 MG: 30 INJECTION, SOLUTION INTRAMUSCULAR; INTRAVENOUS at 11:07

## 2021-07-20 ENCOUNTER — PATIENT MESSAGE (OUTPATIENT)
Dept: ENDOSCOPY | Facility: HOSPITAL | Age: 51
End: 2021-07-20

## 2021-07-22 ENCOUNTER — PATIENT MESSAGE (OUTPATIENT)
Dept: INTERNAL MEDICINE | Facility: CLINIC | Age: 51
End: 2021-07-22

## 2021-07-22 DIAGNOSIS — M54.50 ACUTE BILATERAL LOW BACK PAIN WITHOUT SCIATICA: Primary | ICD-10-CM

## 2021-07-22 RX ORDER — METHYLPREDNISOLONE 4 MG/1
TABLET ORAL
Qty: 1 PACKAGE | Refills: 0 | Status: SHIPPED | OUTPATIENT
Start: 2021-07-22 | End: 2023-06-03 | Stop reason: ALTCHOICE

## 2021-10-28 ENCOUNTER — PATIENT MESSAGE (OUTPATIENT)
Dept: ENDOSCOPY | Facility: HOSPITAL | Age: 51
End: 2021-10-28
Payer: COMMERCIAL

## 2021-10-29 RX ORDER — SODIUM, POTASSIUM,MAG SULFATES 17.5-3.13G
1 SOLUTION, RECONSTITUTED, ORAL ORAL DAILY
Qty: 1 KIT | Refills: 0 | Status: SHIPPED | OUTPATIENT
Start: 2021-10-29 | End: 2021-11-06

## 2021-11-12 ENCOUNTER — PATIENT OUTREACH (OUTPATIENT)
Dept: ADMINISTRATIVE | Facility: OTHER | Age: 51
End: 2021-11-12
Payer: COMMERCIAL

## 2021-11-15 ENCOUNTER — OFFICE VISIT (OUTPATIENT)
Dept: DERMATOLOGY | Facility: CLINIC | Age: 51
End: 2021-11-15
Payer: COMMERCIAL

## 2021-11-15 ENCOUNTER — OFFICE VISIT (OUTPATIENT)
Dept: OPHTHALMOLOGY | Facility: CLINIC | Age: 51
End: 2021-11-15
Payer: COMMERCIAL

## 2021-11-15 DIAGNOSIS — H01.024 SQUAMOUS BLEPHARITIS OF UPPER EYELIDS OF BOTH EYES: Primary | ICD-10-CM

## 2021-11-15 DIAGNOSIS — H52.4 PRESBYOPIA OF BOTH EYES: ICD-10-CM

## 2021-11-15 DIAGNOSIS — H40.003 GLAUCOMA SUSPECT OF BOTH EYES: ICD-10-CM

## 2021-11-15 DIAGNOSIS — H01.021 SQUAMOUS BLEPHARITIS OF UPPER EYELIDS OF BOTH EYES: Primary | ICD-10-CM

## 2021-11-15 DIAGNOSIS — L82.1 SEBORRHEIC KERATOSES: ICD-10-CM

## 2021-11-15 DIAGNOSIS — Z12.83 SKIN CANCER SCREENING: ICD-10-CM

## 2021-11-15 DIAGNOSIS — D18.00 HEMANGIOMA, UNSPECIFIED SITE: ICD-10-CM

## 2021-11-15 DIAGNOSIS — D22.9 MULTIPLE BENIGN NEVI: ICD-10-CM

## 2021-11-15 DIAGNOSIS — B35.6 TINEA CRURIS: ICD-10-CM

## 2021-11-15 DIAGNOSIS — L81.4 SOLAR LENTIGO: ICD-10-CM

## 2021-11-15 DIAGNOSIS — L91.8 ACROCHORDON: ICD-10-CM

## 2021-11-15 DIAGNOSIS — D48.5 NEOPLASM OF UNCERTAIN BEHAVIOR OF SKIN: Primary | ICD-10-CM

## 2021-11-15 DIAGNOSIS — L57.0 ACTINIC KERATOSES: ICD-10-CM

## 2021-11-15 PROCEDURE — 99203 PR OFFICE/OUTPT VISIT, NEW, LEVL III, 30-44 MIN: ICD-10-PCS | Mod: 25,S$GLB,, | Performed by: STUDENT IN AN ORGANIZED HEALTH CARE EDUCATION/TRAINING PROGRAM

## 2021-11-15 PROCEDURE — 4010F ACE/ARB THERAPY RXD/TAKEN: CPT | Mod: CPTII,S$GLB,, | Performed by: OPTOMETRIST

## 2021-11-15 PROCEDURE — 17000 PR DESTRUCTION(LASER SURGERY,CRYOSURGERY,CHEMOSURGERY),PREMALIGNANT LESIONS,FIRST LESION: ICD-10-PCS | Mod: 59,S$GLB,, | Performed by: STUDENT IN AN ORGANIZED HEALTH CARE EDUCATION/TRAINING PROGRAM

## 2021-11-15 PROCEDURE — 4010F PR ACE/ARB THEARPY RXD/TAKEN: ICD-10-PCS | Mod: CPTII,S$GLB,, | Performed by: STUDENT IN AN ORGANIZED HEALTH CARE EDUCATION/TRAINING PROGRAM

## 2021-11-15 PROCEDURE — 92004 COMPRE OPH EXAM NEW PT 1/>: CPT | Mod: S$GLB,,, | Performed by: OPTOMETRIST

## 2021-11-15 PROCEDURE — 3044F HG A1C LEVEL LT 7.0%: CPT | Mod: CPTII,S$GLB,, | Performed by: OPTOMETRIST

## 2021-11-15 PROCEDURE — 99999 PR PBB SHADOW E&M-EST. PATIENT-LVL III: CPT | Mod: PBBFAC,,, | Performed by: OPTOMETRIST

## 2021-11-15 PROCEDURE — 92004 PR EYE EXAM, NEW PATIENT,COMPREHESV: ICD-10-PCS | Mod: S$GLB,,, | Performed by: OPTOMETRIST

## 2021-11-15 PROCEDURE — 11102 TANGNTL BX SKIN SINGLE LES: CPT | Mod: S$GLB,,, | Performed by: STUDENT IN AN ORGANIZED HEALTH CARE EDUCATION/TRAINING PROGRAM

## 2021-11-15 PROCEDURE — 1160F PR REVIEW ALL MEDS BY PRESCRIBER/CLIN PHARMACIST DOCUMENTED: ICD-10-PCS | Mod: CPTII,S$GLB,, | Performed by: STUDENT IN AN ORGANIZED HEALTH CARE EDUCATION/TRAINING PROGRAM

## 2021-11-15 PROCEDURE — 88305 TISSUE EXAM BY PATHOLOGIST: CPT | Mod: 26,,, | Performed by: PATHOLOGY

## 2021-11-15 PROCEDURE — 1159F PR MEDICATION LIST DOCUMENTED IN MEDICAL RECORD: ICD-10-PCS | Mod: CPTII,S$GLB,, | Performed by: STUDENT IN AN ORGANIZED HEALTH CARE EDUCATION/TRAINING PROGRAM

## 2021-11-15 PROCEDURE — 99999 PR PBB SHADOW E&M-EST. PATIENT-LVL III: ICD-10-PCS | Mod: PBBFAC,,, | Performed by: OPTOMETRIST

## 2021-11-15 PROCEDURE — 88305 TISSUE EXAM BY PATHOLOGIST: CPT | Performed by: PATHOLOGY

## 2021-11-15 PROCEDURE — 11102 PR TANGENTIAL BIOPSY, SKIN, SINGLE LESION: ICD-10-PCS | Mod: S$GLB,,, | Performed by: STUDENT IN AN ORGANIZED HEALTH CARE EDUCATION/TRAINING PROGRAM

## 2021-11-15 PROCEDURE — 3044F PR MOST RECENT HEMOGLOBIN A1C LEVEL <7.0%: ICD-10-PCS | Mod: CPTII,S$GLB,, | Performed by: OPTOMETRIST

## 2021-11-15 PROCEDURE — 99999 PR PBB SHADOW E&M-EST. PATIENT-LVL III: CPT | Mod: PBBFAC,,, | Performed by: STUDENT IN AN ORGANIZED HEALTH CARE EDUCATION/TRAINING PROGRAM

## 2021-11-15 PROCEDURE — 4010F ACE/ARB THERAPY RXD/TAKEN: CPT | Mod: CPTII,S$GLB,, | Performed by: STUDENT IN AN ORGANIZED HEALTH CARE EDUCATION/TRAINING PROGRAM

## 2021-11-15 PROCEDURE — 1160F RVW MEDS BY RX/DR IN RCRD: CPT | Mod: CPTII,S$GLB,, | Performed by: STUDENT IN AN ORGANIZED HEALTH CARE EDUCATION/TRAINING PROGRAM

## 2021-11-15 PROCEDURE — 3044F HG A1C LEVEL LT 7.0%: CPT | Mod: CPTII,S$GLB,, | Performed by: STUDENT IN AN ORGANIZED HEALTH CARE EDUCATION/TRAINING PROGRAM

## 2021-11-15 PROCEDURE — 99203 OFFICE O/P NEW LOW 30 MIN: CPT | Mod: 25,S$GLB,, | Performed by: STUDENT IN AN ORGANIZED HEALTH CARE EDUCATION/TRAINING PROGRAM

## 2021-11-15 PROCEDURE — 17003 DESTRUCTION, PREMALIGNANT LESIONS; SECOND THROUGH 14 LESIONS: ICD-10-PCS | Mod: S$GLB,,, | Performed by: STUDENT IN AN ORGANIZED HEALTH CARE EDUCATION/TRAINING PROGRAM

## 2021-11-15 PROCEDURE — 99999 PR PBB SHADOW E&M-EST. PATIENT-LVL III: ICD-10-PCS | Mod: PBBFAC,,, | Performed by: STUDENT IN AN ORGANIZED HEALTH CARE EDUCATION/TRAINING PROGRAM

## 2021-11-15 PROCEDURE — 4010F PR ACE/ARB THEARPY RXD/TAKEN: ICD-10-PCS | Mod: CPTII,S$GLB,, | Performed by: OPTOMETRIST

## 2021-11-15 PROCEDURE — 17003 DESTRUCT PREMALG LES 2-14: CPT | Mod: S$GLB,,, | Performed by: STUDENT IN AN ORGANIZED HEALTH CARE EDUCATION/TRAINING PROGRAM

## 2021-11-15 PROCEDURE — 1159F MED LIST DOCD IN RCRD: CPT | Mod: CPTII,S$GLB,, | Performed by: STUDENT IN AN ORGANIZED HEALTH CARE EDUCATION/TRAINING PROGRAM

## 2021-11-15 PROCEDURE — 17000 DESTRUCT PREMALG LESION: CPT | Mod: 59,S$GLB,, | Performed by: STUDENT IN AN ORGANIZED HEALTH CARE EDUCATION/TRAINING PROGRAM

## 2021-11-15 PROCEDURE — 88305 TISSUE EXAM BY PATHOLOGIST: ICD-10-PCS | Mod: 26,,, | Performed by: PATHOLOGY

## 2021-11-15 PROCEDURE — 3044F PR MOST RECENT HEMOGLOBIN A1C LEVEL <7.0%: ICD-10-PCS | Mod: CPTII,S$GLB,, | Performed by: STUDENT IN AN ORGANIZED HEALTH CARE EDUCATION/TRAINING PROGRAM

## 2021-11-17 ENCOUNTER — PATIENT MESSAGE (OUTPATIENT)
Dept: OPHTHALMOLOGY | Facility: CLINIC | Age: 51
End: 2021-11-17
Payer: COMMERCIAL

## 2021-11-19 ENCOUNTER — PATIENT MESSAGE (OUTPATIENT)
Dept: DERMATOLOGY | Facility: CLINIC | Age: 51
End: 2021-11-19
Payer: COMMERCIAL

## 2021-11-19 ENCOUNTER — NURSE TRIAGE (OUTPATIENT)
Dept: ADMINISTRATIVE | Facility: CLINIC | Age: 51
End: 2021-11-19
Payer: COMMERCIAL

## 2021-11-19 ENCOUNTER — PATIENT MESSAGE (OUTPATIENT)
Dept: OPHTHALMOLOGY | Facility: CLINIC | Age: 51
End: 2021-11-19
Payer: COMMERCIAL

## 2021-11-19 DIAGNOSIS — B02.8 HERPES ZOSTER WITH COMPLICATION: Primary | ICD-10-CM

## 2021-11-19 LAB
FINAL PATHOLOGIC DIAGNOSIS: NORMAL
GROSS: NORMAL
Lab: NORMAL
MICROSCOPIC EXAM: NORMAL

## 2021-11-19 RX ORDER — VALACYCLOVIR HYDROCHLORIDE 1 G/1
1000 TABLET, FILM COATED ORAL 3 TIMES DAILY
Qty: 21 TABLET | Refills: 0 | Status: SHIPPED | OUTPATIENT
Start: 2021-11-19 | End: 2021-11-23 | Stop reason: SDUPTHER

## 2021-11-20 ENCOUNTER — PATIENT MESSAGE (OUTPATIENT)
Dept: OPHTHALMOLOGY | Facility: CLINIC | Age: 51
End: 2021-11-20
Payer: COMMERCIAL

## 2021-11-21 ENCOUNTER — NURSE TRIAGE (OUTPATIENT)
Dept: ADMINISTRATIVE | Facility: CLINIC | Age: 51
End: 2021-11-21
Payer: COMMERCIAL

## 2021-11-22 ENCOUNTER — PATIENT MESSAGE (OUTPATIENT)
Dept: INTERNAL MEDICINE | Facility: CLINIC | Age: 51
End: 2021-11-22
Payer: COMMERCIAL

## 2021-11-22 ENCOUNTER — OFFICE VISIT (OUTPATIENT)
Dept: OPHTHALMOLOGY | Facility: CLINIC | Age: 51
End: 2021-11-22
Payer: COMMERCIAL

## 2021-11-22 ENCOUNTER — PATIENT MESSAGE (OUTPATIENT)
Dept: OPHTHALMOLOGY | Facility: CLINIC | Age: 51
End: 2021-11-22

## 2021-11-22 DIAGNOSIS — B02.39 DERMATITIS OF EYELID OF RIGHT EYE DUE TO HERPES ZOSTER: Primary | ICD-10-CM

## 2021-11-22 PROCEDURE — 4010F ACE/ARB THERAPY RXD/TAKEN: CPT | Mod: CPTII,S$GLB,, | Performed by: OPHTHALMOLOGY

## 2021-11-22 PROCEDURE — 99999 PR PBB SHADOW E&M-EST. PATIENT-LVL III: ICD-10-PCS | Mod: PBBFAC,,, | Performed by: OPHTHALMOLOGY

## 2021-11-22 PROCEDURE — 4010F PR ACE/ARB THEARPY RXD/TAKEN: ICD-10-PCS | Mod: CPTII,S$GLB,, | Performed by: OPHTHALMOLOGY

## 2021-11-22 PROCEDURE — 92002 PR EYE EXAM, NEW PATIENT,INTERMED: ICD-10-PCS | Mod: S$GLB,,, | Performed by: OPHTHALMOLOGY

## 2021-11-22 PROCEDURE — 92002 INTRM OPH EXAM NEW PATIENT: CPT | Mod: S$GLB,,, | Performed by: OPHTHALMOLOGY

## 2021-11-22 PROCEDURE — 99999 PR PBB SHADOW E&M-EST. PATIENT-LVL III: CPT | Mod: PBBFAC,,, | Performed by: OPHTHALMOLOGY

## 2021-11-23 ENCOUNTER — PATIENT MESSAGE (OUTPATIENT)
Dept: DERMATOLOGY | Facility: CLINIC | Age: 51
End: 2021-11-23
Payer: COMMERCIAL

## 2021-11-23 DIAGNOSIS — B02.8 HERPES ZOSTER WITH COMPLICATION: ICD-10-CM

## 2021-11-23 RX ORDER — VALACYCLOVIR HYDROCHLORIDE 1 G/1
1000 TABLET, FILM COATED ORAL 3 TIMES DAILY
Qty: 9 TABLET | Refills: 0 | Status: SHIPPED | OUTPATIENT
Start: 2021-11-23 | End: 2021-12-02

## 2021-11-26 ENCOUNTER — PATIENT MESSAGE (OUTPATIENT)
Dept: OPHTHALMOLOGY | Facility: CLINIC | Age: 51
End: 2021-11-26
Payer: COMMERCIAL

## 2021-11-29 ENCOUNTER — OFFICE VISIT (OUTPATIENT)
Dept: OPHTHALMOLOGY | Facility: CLINIC | Age: 51
End: 2021-11-29
Payer: COMMERCIAL

## 2021-11-29 DIAGNOSIS — B02.9 HERPES ZOSTER WITHOUT COMPLICATION: ICD-10-CM

## 2021-11-29 DIAGNOSIS — H40.003 GLAUCOMA SUSPECT OF BOTH EYES: Primary | ICD-10-CM

## 2021-11-29 PROCEDURE — 99999 PR PBB SHADOW E&M-EST. PATIENT-LVL III: ICD-10-PCS | Mod: PBBFAC,,, | Performed by: OPTOMETRIST

## 2021-11-29 PROCEDURE — 99213 OFFICE O/P EST LOW 20 MIN: CPT | Mod: S$GLB,,, | Performed by: OPTOMETRIST

## 2021-11-29 PROCEDURE — 92020 GONIOSCOPY: CPT | Mod: S$GLB,,, | Performed by: OPTOMETRIST

## 2021-11-29 PROCEDURE — 76514 ECHO EXAM OF EYE THICKNESS: CPT | Mod: S$GLB,,, | Performed by: OPTOMETRIST

## 2021-11-29 PROCEDURE — 92083 HUMPHREY VISUAL FIELD - OU - BOTH EYES: ICD-10-PCS | Mod: S$GLB,,, | Performed by: OPTOMETRIST

## 2021-11-29 PROCEDURE — 4010F PR ACE/ARB THEARPY RXD/TAKEN: ICD-10-PCS | Mod: CPTII,S$GLB,, | Performed by: OPTOMETRIST

## 2021-11-29 PROCEDURE — 92083 EXTENDED VISUAL FIELD XM: CPT | Mod: S$GLB,,, | Performed by: OPTOMETRIST

## 2021-11-29 PROCEDURE — 76514 PR  US, EYE, FOR CORNEAL THICKNESS: ICD-10-PCS | Mod: S$GLB,,, | Performed by: OPTOMETRIST

## 2021-11-29 PROCEDURE — 99999 PR PBB SHADOW E&M-EST. PATIENT-LVL III: CPT | Mod: PBBFAC,,, | Performed by: OPTOMETRIST

## 2021-11-29 PROCEDURE — 4010F ACE/ARB THERAPY RXD/TAKEN: CPT | Mod: CPTII,S$GLB,, | Performed by: OPTOMETRIST

## 2021-11-29 PROCEDURE — 99213 PR OFFICE/OUTPT VISIT, EST, LEVL III, 20-29 MIN: ICD-10-PCS | Mod: S$GLB,,, | Performed by: OPTOMETRIST

## 2021-11-29 PROCEDURE — 92020 PR SPECIAL EYE EVAL,GONIOSCOPY: ICD-10-PCS | Mod: S$GLB,,, | Performed by: OPTOMETRIST

## 2021-11-30 ENCOUNTER — PATIENT MESSAGE (OUTPATIENT)
Dept: ENDOSCOPY | Facility: HOSPITAL | Age: 51
End: 2021-11-30
Payer: COMMERCIAL

## 2021-12-02 ENCOUNTER — TELEPHONE (OUTPATIENT)
Dept: PREADMISSION TESTING | Facility: HOSPITAL | Age: 51
End: 2021-12-02
Payer: COMMERCIAL

## 2021-12-02 DIAGNOSIS — B02.8 HERPES ZOSTER WITH COMPLICATION: Primary | ICD-10-CM

## 2021-12-02 RX ORDER — VALACYCLOVIR HYDROCHLORIDE 1 G/1
1000 TABLET, FILM COATED ORAL 3 TIMES DAILY
Qty: 21 TABLET | Refills: 0 | Status: ON HOLD | OUTPATIENT
Start: 2021-12-02 | End: 2023-06-05 | Stop reason: HOSPADM

## 2021-12-06 ENCOUNTER — ANESTHESIA EVENT (OUTPATIENT)
Dept: ENDOSCOPY | Facility: HOSPITAL | Age: 51
End: 2021-12-06
Payer: COMMERCIAL

## 2021-12-07 ENCOUNTER — HOSPITAL ENCOUNTER (OUTPATIENT)
Facility: HOSPITAL | Age: 51
Discharge: HOME | End: 2021-12-07
Attending: INTERNAL MEDICINE | Admitting: INTERNAL MEDICINE
Payer: COMMERCIAL

## 2021-12-07 ENCOUNTER — ANESTHESIA (OUTPATIENT)
Dept: ENDOSCOPY | Facility: HOSPITAL | Age: 51
End: 2021-12-07
Payer: COMMERCIAL

## 2021-12-07 VITALS
TEMPERATURE: 98 F | BODY MASS INDEX: 31.59 KG/M2 | RESPIRATION RATE: 16 BRPM | DIASTOLIC BLOOD PRESSURE: 80 MMHG | HEIGHT: 73 IN | HEART RATE: 69 BPM | WEIGHT: 238.31 LBS | OXYGEN SATURATION: 98 % | SYSTOLIC BLOOD PRESSURE: 150 MMHG

## 2021-12-07 DIAGNOSIS — Z12.11 COLON CANCER SCREENING: Primary | ICD-10-CM

## 2021-12-07 PROCEDURE — 45380 COLONOSCOPY AND BIOPSY: CPT | Mod: 33,,, | Performed by: INTERNAL MEDICINE

## 2021-12-07 PROCEDURE — 37000008 HC ANESTHESIA 1ST 15 MINUTES: Performed by: INTERNAL MEDICINE

## 2021-12-07 PROCEDURE — D9220A PRA ANESTHESIA: ICD-10-PCS | Mod: 33,ANES,, | Performed by: STUDENT IN AN ORGANIZED HEALTH CARE EDUCATION/TRAINING PROGRAM

## 2021-12-07 PROCEDURE — 45380 COLONOSCOPY AND BIOPSY: CPT | Performed by: INTERNAL MEDICINE

## 2021-12-07 PROCEDURE — 88305 TISSUE EXAM BY PATHOLOGIST: CPT | Mod: 59 | Performed by: PATHOLOGY

## 2021-12-07 PROCEDURE — D9220A PRA ANESTHESIA: ICD-10-PCS | Mod: 33,CRNA,, | Performed by: NURSE ANESTHETIST, CERTIFIED REGISTERED

## 2021-12-07 PROCEDURE — 45380 PR COLONOSCOPY,BIOPSY: ICD-10-PCS | Mod: 33,,, | Performed by: INTERNAL MEDICINE

## 2021-12-07 PROCEDURE — 25000003 PHARM REV CODE 250: Performed by: NURSE ANESTHETIST, CERTIFIED REGISTERED

## 2021-12-07 PROCEDURE — 27201012 HC FORCEPS, HOT/COLD, DISP: Performed by: INTERNAL MEDICINE

## 2021-12-07 PROCEDURE — D9220A PRA ANESTHESIA: Mod: 33,CRNA,, | Performed by: NURSE ANESTHETIST, CERTIFIED REGISTERED

## 2021-12-07 PROCEDURE — D9220A PRA ANESTHESIA: Mod: 33,ANES,, | Performed by: STUDENT IN AN ORGANIZED HEALTH CARE EDUCATION/TRAINING PROGRAM

## 2021-12-07 PROCEDURE — 37000009 HC ANESTHESIA EA ADD 15 MINS: Performed by: INTERNAL MEDICINE

## 2021-12-07 PROCEDURE — 63600175 PHARM REV CODE 636 W HCPCS: Performed by: INTERNAL MEDICINE

## 2021-12-07 PROCEDURE — 88305 TISSUE EXAM BY PATHOLOGIST: CPT | Mod: 26,,, | Performed by: PATHOLOGY

## 2021-12-07 PROCEDURE — 63600175 PHARM REV CODE 636 W HCPCS: Performed by: NURSE ANESTHETIST, CERTIFIED REGISTERED

## 2021-12-07 PROCEDURE — 88305 TISSUE EXAM BY PATHOLOGIST: ICD-10-PCS | Mod: 26,,, | Performed by: PATHOLOGY

## 2021-12-07 RX ORDER — PROPOFOL 10 MG/ML
VIAL (ML) INTRAVENOUS
Status: DISCONTINUED | OUTPATIENT
Start: 2021-12-07 | End: 2021-12-07

## 2021-12-07 RX ORDER — SODIUM CHLORIDE, SODIUM LACTATE, POTASSIUM CHLORIDE, CALCIUM CHLORIDE 600; 310; 30; 20 MG/100ML; MG/100ML; MG/100ML; MG/100ML
INJECTION, SOLUTION INTRAVENOUS CONTINUOUS
Status: DISCONTINUED | OUTPATIENT
Start: 2021-12-07 | End: 2021-12-07 | Stop reason: HOSPADM

## 2021-12-07 RX ORDER — LIDOCAINE HYDROCHLORIDE 20 MG/ML
INJECTION, SOLUTION EPIDURAL; INFILTRATION; INTRACAUDAL; PERINEURAL
Status: DISCONTINUED | OUTPATIENT
Start: 2021-12-07 | End: 2021-12-07

## 2021-12-07 RX ADMIN — PROPOFOL 50 MG: 10 INJECTION, EMULSION INTRAVENOUS at 07:12

## 2021-12-07 RX ADMIN — LIDOCAINE HYDROCHLORIDE 80 MG: 20 INJECTION, SOLUTION EPIDURAL; INFILTRATION; INTRACAUDAL; PERINEURAL at 07:12

## 2021-12-07 RX ADMIN — PROPOFOL 30 MG: 10 INJECTION, EMULSION INTRAVENOUS at 07:12

## 2021-12-07 RX ADMIN — PROPOFOL 20 MG: 10 INJECTION, EMULSION INTRAVENOUS at 07:12

## 2021-12-07 RX ADMIN — SODIUM CHLORIDE, SODIUM LACTATE, POTASSIUM CHLORIDE, AND CALCIUM CHLORIDE: 600; 310; 30; 20 INJECTION, SOLUTION INTRAVENOUS at 07:12

## 2021-12-07 RX ADMIN — GLYCOPYRROLATE 0.2 MG: 0.2 INJECTION, SOLUTION INTRAMUSCULAR; INTRAVITREAL at 07:12

## 2021-12-07 RX ADMIN — PROPOFOL 120 MG: 10 INJECTION, EMULSION INTRAVENOUS at 07:12

## 2021-12-14 LAB
FINAL PATHOLOGIC DIAGNOSIS: NORMAL
Lab: NORMAL

## 2021-12-20 ENCOUNTER — TELEPHONE (OUTPATIENT)
Dept: GASTROENTEROLOGY | Facility: CLINIC | Age: 51
End: 2021-12-20
Payer: COMMERCIAL

## 2021-12-22 ENCOUNTER — PATIENT MESSAGE (OUTPATIENT)
Dept: GASTROENTEROLOGY | Facility: CLINIC | Age: 51
End: 2021-12-22
Payer: COMMERCIAL

## 2022-01-19 ENCOUNTER — OFFICE VISIT (OUTPATIENT)
Dept: INTERNAL MEDICINE | Facility: CLINIC | Age: 52
End: 2022-01-19
Payer: COMMERCIAL

## 2022-01-19 VITALS
BODY MASS INDEX: 32.52 KG/M2 | HEART RATE: 71 BPM | WEIGHT: 246.5 LBS | OXYGEN SATURATION: 98 % | DIASTOLIC BLOOD PRESSURE: 92 MMHG | RESPIRATION RATE: 18 BRPM | TEMPERATURE: 97 F | SYSTOLIC BLOOD PRESSURE: 164 MMHG

## 2022-01-19 DIAGNOSIS — G99.2 MYELOPATHY CONCURRENT WITH AND DUE TO SPINAL STENOSIS OF CERVICAL REGION: Chronic | ICD-10-CM

## 2022-01-19 DIAGNOSIS — R73.03 PRE-DIABETES: ICD-10-CM

## 2022-01-19 DIAGNOSIS — I10 HYPERTENSION, UNSPECIFIED TYPE: Primary | ICD-10-CM

## 2022-01-19 DIAGNOSIS — M10.9 GOUT OF FOOT, UNSPECIFIED CAUSE, UNSPECIFIED CHRONICITY, UNSPECIFIED LATERALITY: ICD-10-CM

## 2022-01-19 DIAGNOSIS — E78.5 HYPERLIPIDEMIA, UNSPECIFIED HYPERLIPIDEMIA TYPE: ICD-10-CM

## 2022-01-19 DIAGNOSIS — Z12.5 PROSTATE CANCER SCREENING: ICD-10-CM

## 2022-01-19 DIAGNOSIS — F41.9 ANXIETY: ICD-10-CM

## 2022-01-19 DIAGNOSIS — E66.9 OBESITY (BMI 30.0-34.9): ICD-10-CM

## 2022-01-19 DIAGNOSIS — R16.2 HEPATOSPLENOMEGALY: ICD-10-CM

## 2022-01-19 DIAGNOSIS — M48.02 MYELOPATHY CONCURRENT WITH AND DUE TO SPINAL STENOSIS OF CERVICAL REGION: Chronic | ICD-10-CM

## 2022-01-19 PROCEDURE — 1160F RVW MEDS BY RX/DR IN RCRD: CPT | Mod: CPTII,S$GLB,, | Performed by: PEDIATRICS

## 2022-01-19 PROCEDURE — 1160F PR REVIEW ALL MEDS BY PRESCRIBER/CLIN PHARMACIST DOCUMENTED: ICD-10-PCS | Mod: CPTII,S$GLB,, | Performed by: PEDIATRICS

## 2022-01-19 PROCEDURE — 1159F MED LIST DOCD IN RCRD: CPT | Mod: CPTII,S$GLB,, | Performed by: PEDIATRICS

## 2022-01-19 PROCEDURE — 99999 PR PBB SHADOW E&M-EST. PATIENT-LVL V: CPT | Mod: PBBFAC,,, | Performed by: PEDIATRICS

## 2022-01-19 PROCEDURE — 3080F PR MOST RECENT DIASTOLIC BLOOD PRESSURE >= 90 MM HG: ICD-10-PCS | Mod: CPTII,S$GLB,, | Performed by: PEDIATRICS

## 2022-01-19 PROCEDURE — 99214 OFFICE O/P EST MOD 30 MIN: CPT | Mod: S$GLB,,, | Performed by: PEDIATRICS

## 2022-01-19 PROCEDURE — 3080F DIAST BP >= 90 MM HG: CPT | Mod: CPTII,S$GLB,, | Performed by: PEDIATRICS

## 2022-01-19 PROCEDURE — 3077F PR MOST RECENT SYSTOLIC BLOOD PRESSURE >= 140 MM HG: ICD-10-PCS | Mod: CPTII,S$GLB,, | Performed by: PEDIATRICS

## 2022-01-19 PROCEDURE — 3008F PR BODY MASS INDEX (BMI) DOCUMENTED: ICD-10-PCS | Mod: CPTII,S$GLB,, | Performed by: PEDIATRICS

## 2022-01-19 PROCEDURE — 3008F BODY MASS INDEX DOCD: CPT | Mod: CPTII,S$GLB,, | Performed by: PEDIATRICS

## 2022-01-19 PROCEDURE — 3077F SYST BP >= 140 MM HG: CPT | Mod: CPTII,S$GLB,, | Performed by: PEDIATRICS

## 2022-01-19 PROCEDURE — 99214 PR OFFICE/OUTPT VISIT, EST, LEVL IV, 30-39 MIN: ICD-10-PCS | Mod: S$GLB,,, | Performed by: PEDIATRICS

## 2022-01-19 PROCEDURE — 99999 PR PBB SHADOW E&M-EST. PATIENT-LVL V: ICD-10-PCS | Mod: PBBFAC,,, | Performed by: PEDIATRICS

## 2022-01-19 PROCEDURE — 1159F PR MEDICATION LIST DOCUMENTED IN MEDICAL RECORD: ICD-10-PCS | Mod: CPTII,S$GLB,, | Performed by: PEDIATRICS

## 2022-01-19 NOTE — PROGRESS NOTES
Subjective:       Patient ID: Sae Santiago is a 51 y.o. male.    Chief Complaint: Establish Care    Sae Santiago is a 51 y.o. male who presents to clinic to establish care    PMHx, PSHx, SocHx, and FHx reviewed and discussed with patient.       1) HTN: B/P good, no HTNive symptoms. Is home monitoring. Dig HTN med entries reviewed. Thinks the episodic increased BP is stress induced     2) Obesity: not following D&E, weight is up     3) Anxiety: ativan PRN, scheduled Buspar 5mg TID     4) Hyperlipidemia: not following D&E     5) Varicosities of leg: quiet     6) Gout: quiet     7) Neck pain/Cervical radiculopathy/Myelopathy concurrent with and due to spinal stenosis of cervical region: resolved with cervical surgery     8) Hepatosplenomegaly: due to obesity        Review of Systems   Constitutional: Negative for activity change, appetite change, chills, diaphoresis, fatigue, fever and unexpected weight change.   HENT: Negative for nasal congestion, ear pain, mouth sores, nosebleeds, postnasal drip, rhinorrhea, sneezing and sore throat.    Eyes: Negative for photophobia, pain, discharge, redness and visual disturbance.   Respiratory: Negative for cough, chest tightness, shortness of breath, wheezing and stridor.    Cardiovascular: Negative for chest pain, palpitations and leg swelling.   Gastrointestinal: Negative for abdominal distention, blood in stool, constipation, diarrhea, nausea and vomiting.   Genitourinary: Negative for decreased urine volume, difficulty urinating, dysuria, flank pain, frequency, genital sores, hematuria and urgency.   Musculoskeletal: Negative for arthralgias, back pain, joint swelling, neck pain and neck stiffness.   Integumentary:  Negative for color change, pallor, rash and wound.   Neurological: Negative for dizziness, syncope, speech difficulty, weakness, light-headedness and headaches.   Hematological: Negative for adenopathy. Does not bruise/bleed easily.    Psychiatric/Behavioral: Negative for confusion, decreased concentration, dysphoric mood, hallucinations, sleep disturbance and suicidal ideas. The patient is not nervous/anxious.    All other systems reviewed and are negative.        Objective:      Physical Exam  Vitals and nursing note reviewed.   Constitutional:       General: He is not in acute distress.     Appearance: He is well-developed.   Neck:      Thyroid: No thyromegaly.      Vascular: No JVD.   Cardiovascular:      Rate and Rhythm: Normal rate and regular rhythm.      Heart sounds: Normal heart sounds. No murmur heard.      Pulmonary:      Effort: Pulmonary effort is normal. No respiratory distress.      Breath sounds: Normal breath sounds. No wheezing or rales.   Abdominal:      General: There is no distension.      Palpations: Abdomen is soft. There is no mass.      Tenderness: There is no abdominal tenderness. There is no guarding.   Musculoskeletal:      Right lower leg: No edema.      Left lower leg: No edema.   Lymphadenopathy:      Cervical: No cervical adenopathy.   Skin:     Capillary Refill: Capillary refill takes less than 2 seconds.      Findings: No rash.   Neurological:      General: No focal deficit present.      Mental Status: He is alert and oriented to person, place, and time.      Cranial Nerves: No cranial nerve deficit.      Coordination: Coordination normal.   Psychiatric:         Mood and Affect: Mood normal.         Behavior: Behavior normal.         Thought Content: Thought content normal.         Judgment: Judgment normal.         Assessment:       Problem List Items Addressed This Visit     Myelopathy concurrent with and due to spinal stenosis of cervical region (Chronic)    HTN (hypertension) - Primary    Relevant Orders    Comprehensive Metabolic Panel    Obesity (BMI 30.0-34.9)    Anxiety    Hyperlipidemia    Relevant Orders    Lipid Panel    Gout    Relevant Orders    Uric Acid    Hepatosplenomegaly      Other Visit  Diagnoses     Prostate cancer screening        Relevant Orders    PSA, Screening    Pre-diabetes        Relevant Orders    Hemoglobin A1C          Plan:     Hypertension, unspecified type  -     Comprehensive Metabolic Panel; Future; Expected date: 01/19/2022    Hyperlipidemia, unspecified hyperlipidemia type  -     Lipid Panel; Future; Expected date: 01/19/2022    Hepatosplenomegaly    Gout of foot, unspecified cause, unspecified chronicity, unspecified laterality  -     Uric Acid; Future; Expected date: 01/19/2022    Anxiety    Myelopathy concurrent with and due to spinal stenosis of cervical region    Obesity (BMI 30.0-34.9)    Prostate cancer screening  -     PSA, Screening; Future; Expected date: 01/19/2022    Pre-diabetes  -     Hemoglobin A1C; Future; Expected date: 01/19/2022    B/P has strong white coat component, maintain meds and dig htn. Try to increase buspar to 7.5 mg tid(SSRI in past caused serotonin syndrome). F/U in 3-6 months with labs to see what D&E can do. Recommended shingrix and moderna covid booster.  Scribe Attestation:   I, Moris Pyle, am scribing for, and in the presence of, Dr. Librado Reyes Jr. I performed the above scribed service and the documentation accurately describes the services I performed. I attest to the accuracy of the note.    I, Dr. Librado Reyes Jr, reviewed documentation as scribed above. I personally performed the services described in this documentation.  I agree that the record reflects my personal performance and is accurate and complete. Librado Reyes Jr., MD.  01/19/2022

## 2022-01-26 ENCOUNTER — PATIENT MESSAGE (OUTPATIENT)
Dept: ADMINISTRATIVE | Facility: OTHER | Age: 52
End: 2022-01-26
Payer: COMMERCIAL

## 2022-04-19 ENCOUNTER — LAB VISIT (OUTPATIENT)
Dept: LAB | Facility: HOSPITAL | Age: 52
End: 2022-04-19
Attending: PEDIATRICS
Payer: COMMERCIAL

## 2022-04-19 DIAGNOSIS — M10.9 GOUT OF FOOT, UNSPECIFIED CAUSE, UNSPECIFIED CHRONICITY, UNSPECIFIED LATERALITY: ICD-10-CM

## 2022-04-19 DIAGNOSIS — R73.03 PRE-DIABETES: ICD-10-CM

## 2022-04-19 DIAGNOSIS — I10 HYPERTENSION, UNSPECIFIED TYPE: ICD-10-CM

## 2022-04-19 DIAGNOSIS — Z12.5 PROSTATE CANCER SCREENING: ICD-10-CM

## 2022-04-19 DIAGNOSIS — E78.5 HYPERLIPIDEMIA, UNSPECIFIED HYPERLIPIDEMIA TYPE: ICD-10-CM

## 2022-04-19 LAB
ALBUMIN SERPL BCP-MCNC: 4.1 G/DL (ref 3.5–5.2)
ALP SERPL-CCNC: 67 U/L (ref 55–135)
ALT SERPL W/O P-5'-P-CCNC: 48 U/L (ref 10–44)
ANION GAP SERPL CALC-SCNC: 9 MMOL/L (ref 8–16)
AST SERPL-CCNC: 35 U/L (ref 10–40)
BILIRUB SERPL-MCNC: 0.6 MG/DL (ref 0.1–1)
BUN SERPL-MCNC: 17 MG/DL (ref 6–20)
CALCIUM SERPL-MCNC: 10.1 MG/DL (ref 8.7–10.5)
CHLORIDE SERPL-SCNC: 99 MMOL/L (ref 95–110)
CHOLEST SERPL-MCNC: 155 MG/DL (ref 120–199)
CHOLEST/HDLC SERPL: 5 {RATIO} (ref 2–5)
CO2 SERPL-SCNC: 32 MMOL/L (ref 23–29)
COMPLEXED PSA SERPL-MCNC: 1.2 NG/ML (ref 0–4)
CREAT SERPL-MCNC: 1.1 MG/DL (ref 0.5–1.4)
EST. GFR  (AFRICAN AMERICAN): >60 ML/MIN/1.73 M^2
EST. GFR  (NON AFRICAN AMERICAN): >60 ML/MIN/1.73 M^2
ESTIMATED AVG GLUCOSE: 126 MG/DL (ref 68–131)
GLUCOSE SERPL-MCNC: 128 MG/DL (ref 70–110)
HBA1C MFR BLD: 6 % (ref 4–5.6)
HDLC SERPL-MCNC: 31 MG/DL (ref 40–75)
HDLC SERPL: 20 % (ref 20–50)
LDLC SERPL CALC-MCNC: 63.4 MG/DL (ref 63–159)
NONHDLC SERPL-MCNC: 124 MG/DL
POTASSIUM SERPL-SCNC: 4.5 MMOL/L (ref 3.5–5.1)
PROT SERPL-MCNC: 7.2 G/DL (ref 6–8.4)
SODIUM SERPL-SCNC: 140 MMOL/L (ref 136–145)
TRIGL SERPL-MCNC: 303 MG/DL (ref 30–150)
URATE SERPL-MCNC: 8.6 MG/DL (ref 3.4–7)

## 2022-04-19 PROCEDURE — 36415 COLL VENOUS BLD VENIPUNCTURE: CPT | Performed by: PEDIATRICS

## 2022-04-19 PROCEDURE — 83036 HEMOGLOBIN GLYCOSYLATED A1C: CPT | Performed by: PEDIATRICS

## 2022-04-19 PROCEDURE — 84153 ASSAY OF PSA TOTAL: CPT | Performed by: PEDIATRICS

## 2022-04-19 PROCEDURE — 80061 LIPID PANEL: CPT | Performed by: PEDIATRICS

## 2022-04-19 PROCEDURE — 84550 ASSAY OF BLOOD/URIC ACID: CPT | Performed by: PEDIATRICS

## 2022-04-19 PROCEDURE — 80053 COMPREHEN METABOLIC PANEL: CPT | Performed by: PEDIATRICS

## 2022-04-29 ENCOUNTER — OFFICE VISIT (OUTPATIENT)
Dept: INTERNAL MEDICINE | Facility: CLINIC | Age: 52
End: 2022-04-29
Payer: COMMERCIAL

## 2022-04-29 VITALS
TEMPERATURE: 98 F | BODY MASS INDEX: 32.84 KG/M2 | SYSTOLIC BLOOD PRESSURE: 126 MMHG | RESPIRATION RATE: 16 BRPM | HEIGHT: 73 IN | DIASTOLIC BLOOD PRESSURE: 84 MMHG | HEART RATE: 73 BPM | OXYGEN SATURATION: 99 % | WEIGHT: 247.81 LBS

## 2022-04-29 DIAGNOSIS — E66.9 OBESITY (BMI 30.0-34.9): ICD-10-CM

## 2022-04-29 DIAGNOSIS — E78.5 HYPERLIPIDEMIA, UNSPECIFIED HYPERLIPIDEMIA TYPE: ICD-10-CM

## 2022-04-29 DIAGNOSIS — I10 HYPERTENSION, UNSPECIFIED TYPE: Primary | ICD-10-CM

## 2022-04-29 DIAGNOSIS — F41.9 ANXIETY: ICD-10-CM

## 2022-04-29 PROBLEM — M54.12 CERVICAL RADICULOPATHY: Status: RESOLVED | Noted: 2019-12-13 | Resolved: 2022-04-29

## 2022-04-29 PROBLEM — M48.02 MYELOPATHY CONCURRENT WITH AND DUE TO SPINAL STENOSIS OF CERVICAL REGION: Chronic | Status: RESOLVED | Noted: 2020-01-09 | Resolved: 2022-04-29

## 2022-04-29 PROBLEM — G99.2 MYELOPATHY CONCURRENT WITH AND DUE TO SPINAL STENOSIS OF CERVICAL REGION: Chronic | Status: RESOLVED | Noted: 2020-01-09 | Resolved: 2022-04-29

## 2022-04-29 PROBLEM — M54.2 NECK PAIN: Status: RESOLVED | Noted: 2017-02-23 | Resolved: 2022-04-29

## 2022-04-29 PROCEDURE — 3044F PR MOST RECENT HEMOGLOBIN A1C LEVEL <7.0%: ICD-10-PCS | Mod: CPTII,S$GLB,, | Performed by: PEDIATRICS

## 2022-04-29 PROCEDURE — 99999 PR PBB SHADOW E&M-EST. PATIENT-LVL V: ICD-10-PCS | Mod: PBBFAC,,, | Performed by: PEDIATRICS

## 2022-04-29 PROCEDURE — 99214 PR OFFICE/OUTPT VISIT, EST, LEVL IV, 30-39 MIN: ICD-10-PCS | Mod: S$GLB,,, | Performed by: PEDIATRICS

## 2022-04-29 PROCEDURE — 1159F PR MEDICATION LIST DOCUMENTED IN MEDICAL RECORD: ICD-10-PCS | Mod: CPTII,S$GLB,, | Performed by: PEDIATRICS

## 2022-04-29 PROCEDURE — 4010F PR ACE/ARB THEARPY RXD/TAKEN: ICD-10-PCS | Mod: CPTII,S$GLB,, | Performed by: PEDIATRICS

## 2022-04-29 PROCEDURE — 99214 OFFICE O/P EST MOD 30 MIN: CPT | Mod: S$GLB,,, | Performed by: PEDIATRICS

## 2022-04-29 PROCEDURE — 3079F PR MOST RECENT DIASTOLIC BLOOD PRESSURE 80-89 MM HG: ICD-10-PCS | Mod: CPTII,S$GLB,, | Performed by: PEDIATRICS

## 2022-04-29 PROCEDURE — 1160F PR REVIEW ALL MEDS BY PRESCRIBER/CLIN PHARMACIST DOCUMENTED: ICD-10-PCS | Mod: CPTII,S$GLB,, | Performed by: PEDIATRICS

## 2022-04-29 PROCEDURE — 3074F SYST BP LT 130 MM HG: CPT | Mod: CPTII,S$GLB,, | Performed by: PEDIATRICS

## 2022-04-29 PROCEDURE — 1160F RVW MEDS BY RX/DR IN RCRD: CPT | Mod: CPTII,S$GLB,, | Performed by: PEDIATRICS

## 2022-04-29 PROCEDURE — 3044F HG A1C LEVEL LT 7.0%: CPT | Mod: CPTII,S$GLB,, | Performed by: PEDIATRICS

## 2022-04-29 PROCEDURE — 3008F PR BODY MASS INDEX (BMI) DOCUMENTED: ICD-10-PCS | Mod: CPTII,S$GLB,, | Performed by: PEDIATRICS

## 2022-04-29 PROCEDURE — 3074F PR MOST RECENT SYSTOLIC BLOOD PRESSURE < 130 MM HG: ICD-10-PCS | Mod: CPTII,S$GLB,, | Performed by: PEDIATRICS

## 2022-04-29 PROCEDURE — 3079F DIAST BP 80-89 MM HG: CPT | Mod: CPTII,S$GLB,, | Performed by: PEDIATRICS

## 2022-04-29 PROCEDURE — 3008F BODY MASS INDEX DOCD: CPT | Mod: CPTII,S$GLB,, | Performed by: PEDIATRICS

## 2022-04-29 PROCEDURE — 99999 PR PBB SHADOW E&M-EST. PATIENT-LVL V: CPT | Mod: PBBFAC,,, | Performed by: PEDIATRICS

## 2022-04-29 PROCEDURE — 1159F MED LIST DOCD IN RCRD: CPT | Mod: CPTII,S$GLB,, | Performed by: PEDIATRICS

## 2022-04-29 PROCEDURE — 4010F ACE/ARB THERAPY RXD/TAKEN: CPT | Mod: CPTII,S$GLB,, | Performed by: PEDIATRICS

## 2022-04-29 RX ORDER — ROSUVASTATIN CALCIUM 40 MG/1
40 TABLET, COATED ORAL NIGHTLY
Qty: 90 TABLET | Refills: 3 | Status: SHIPPED | OUTPATIENT
Start: 2022-04-29 | End: 2023-09-05 | Stop reason: SDUPTHER

## 2022-04-29 NOTE — PROGRESS NOTES
Subjective:       Patient ID: Sae Santiago is a 51 y.o. male.    Chief Complaint: Follow-up and Results    Sae Santiago is a 51 y.o. male who presents to clinic for a follow up. Wife is present.     1) HTN: B/P good, no HTNive symptoms. Is home monitoring. Dig HTN med entries reviewed. Thinks the episodic increased BP is stress induced.   2) Anxiety: buspar increase did not work, has been taking 5mg 3x/day and 2.5mg PRN at night to help sleep, avoids Ativan use as much as possible, wife agrees he is doing quiet well  3) Gout: last episode 2015, using tart cherry  4) Obesity: not following D&E, weight is up  5) Hyperlipidemia: not following D&E, is on lipitor 40mg daily  6) Varicosities of leg: quiet  7) Neck pain/Cervical radiculopathy/Myelopathy: concurrent with and due to spinal stenosis of cervical region: resolved with cervical surgery. Will be removed from problem list.   8) Hepatosplenomegaly: due to obesity     LABS REVIEWED AND DISCUSSED WITH PATIENT: uric acid, PSA, CMP, A1C, and lipid panel    Review of Systems   Constitutional: Negative for activity change, appetite change, chills, diaphoresis, fatigue, fever and unexpected weight change.   HENT: Negative for nasal congestion, ear pain, mouth sores, nosebleeds, postnasal drip, rhinorrhea, sneezing and sore throat.    Eyes: Negative for photophobia, pain, discharge, redness and visual disturbance.   Respiratory: Negative for cough, chest tightness, shortness of breath, wheezing and stridor.    Cardiovascular: Negative for chest pain, palpitations and leg swelling.   Gastrointestinal: Negative for abdominal distention, blood in stool, constipation, diarrhea, nausea and vomiting.   Genitourinary: Negative for decreased urine volume, difficulty urinating, dysuria, flank pain, frequency, genital sores, hematuria and urgency.   Musculoskeletal: Negative for arthralgias, back pain, joint swelling, neck pain and neck stiffness.   Integumentary:   Negative for color change, pallor, rash and wound.   Neurological: Negative for dizziness, syncope, speech difficulty, weakness, light-headedness and headaches.   Hematological: Negative for adenopathy. Does not bruise/bleed easily.   Psychiatric/Behavioral: Negative for confusion, decreased concentration, dysphoric mood, hallucinations, sleep disturbance and suicidal ideas. The patient is not nervous/anxious.    All other systems reviewed and are negative.        Objective:      Physical Exam  Vitals and nursing note reviewed.   Constitutional:       General: He is not in acute distress.     Appearance: He is well-developed.   Neck:      Thyroid: No thyromegaly.      Vascular: No JVD.   Cardiovascular:      Rate and Rhythm: Normal rate and regular rhythm.      Heart sounds: Normal heart sounds. No murmur heard.  Pulmonary:      Effort: Pulmonary effort is normal. No respiratory distress.      Breath sounds: Normal breath sounds. No wheezing or rales.   Abdominal:      General: There is no distension.      Palpations: Abdomen is soft. There is no mass.      Tenderness: There is no abdominal tenderness. There is no guarding.   Musculoskeletal:      Right lower leg: No edema.      Left lower leg: No edema.   Lymphadenopathy:      Cervical: No cervical adenopathy.   Skin:     Capillary Refill: Capillary refill takes less than 2 seconds.      Findings: No rash.   Neurological:      General: No focal deficit present.      Mental Status: He is alert and oriented to person, place, and time.      Cranial Nerves: No cranial nerve deficit.      Coordination: Coordination normal.   Psychiatric:         Mood and Affect: Mood normal.         Behavior: Behavior normal.         Thought Content: Thought content normal.         Judgment: Judgment normal.         Assessment:       Problem List Items Addressed This Visit     HTN (hypertension) - Primary    Obesity (BMI 30.0-34.9)    Anxiety    Hyperlipidemia    Relevant Medications     rosuvastatin (CRESTOR) 40 MG Tab    Other Relevant Orders    Lipid Panel    Comprehensive Metabolic Panel          Plan:     Hypertension, unspecified type    Hyperlipidemia, unspecified hyperlipidemia type  -     rosuvastatin (CRESTOR) 40 MG Tab; Take 1 tablet (40 mg total) by mouth every evening.  Dispense: 90 tablet; Refill: 3  -     Lipid Panel; Future; Expected date: 04/29/2022  -     Comprehensive Metabolic Panel; Future; Expected date: 04/29/2022    Anxiety    Obesity (BMI 30.0-34.9)    B/P and lipids at goal. Maintain meds, D&E, weight loss. He and wife agree low dose buspar working. F/U 6 months with labs. Covid and shingrix recommended.  Scribe Attestation:   I, Moris Pyle, am scribing for, and in the presence of, Dr. Librado Reyes Jr. I performed the above scribed service and the documentation accurately describes the services I performed. I attest to the accuracy of the note.    I, Dr. Librado Reyes Jr, reviewed documentation as scribed above. I personally performed the services described in this documentation.  I agree that the record reflects my personal performance and is accurate and complete. Librado Reyes Jr., MD.  04/29/2022

## 2022-05-02 ENCOUNTER — PATIENT MESSAGE (OUTPATIENT)
Dept: INTERNAL MEDICINE | Facility: CLINIC | Age: 52
End: 2022-05-02
Payer: COMMERCIAL

## 2022-06-09 ENCOUNTER — PATIENT MESSAGE (OUTPATIENT)
Dept: INTERNAL MEDICINE | Facility: CLINIC | Age: 52
End: 2022-06-09
Payer: COMMERCIAL

## 2022-08-11 DIAGNOSIS — I10 WHITE COAT SYNDROME WITH DIAGNOSIS OF HYPERTENSION: ICD-10-CM

## 2022-08-11 DIAGNOSIS — F41.9 ANXIETY: ICD-10-CM

## 2022-08-15 RX ORDER — BUSPIRONE HYDROCHLORIDE 5 MG/1
5 TABLET ORAL 3 TIMES DAILY
Qty: 270 TABLET | Refills: 3 | Status: SHIPPED | OUTPATIENT
Start: 2022-08-15 | End: 2023-09-06

## 2022-08-15 RX ORDER — LOSARTAN POTASSIUM 100 MG/1
TABLET ORAL
Qty: 90 TABLET | Refills: 3 | Status: SHIPPED | OUTPATIENT
Start: 2022-08-15 | End: 2022-11-10 | Stop reason: SDUPTHER

## 2022-08-15 RX ORDER — METOPROLOL SUCCINATE 25 MG/1
TABLET, EXTENDED RELEASE ORAL
Qty: 135 TABLET | Refills: 3 | Status: SHIPPED | OUTPATIENT
Start: 2022-08-15 | End: 2022-11-10 | Stop reason: SDUPTHER

## 2022-09-06 ENCOUNTER — PATIENT MESSAGE (OUTPATIENT)
Dept: INTERNAL MEDICINE | Facility: CLINIC | Age: 52
End: 2022-09-06
Payer: COMMERCIAL

## 2022-09-19 DIAGNOSIS — I10 WHITE COAT SYNDROME WITH DIAGNOSIS OF HYPERTENSION: ICD-10-CM

## 2022-09-20 RX ORDER — HYDROCHLOROTHIAZIDE 25 MG/1
25 TABLET ORAL DAILY
Qty: 90 TABLET | Refills: 3 | Status: SHIPPED | OUTPATIENT
Start: 2022-09-20 | End: 2022-11-10 | Stop reason: SDUPTHER

## 2022-10-28 ENCOUNTER — LAB VISIT (OUTPATIENT)
Dept: LAB | Facility: HOSPITAL | Age: 52
End: 2022-10-28
Attending: PEDIATRICS
Payer: COMMERCIAL

## 2022-10-28 DIAGNOSIS — E78.5 HYPERLIPIDEMIA, UNSPECIFIED HYPERLIPIDEMIA TYPE: ICD-10-CM

## 2022-10-28 LAB
ALBUMIN SERPL BCP-MCNC: 4.1 G/DL (ref 3.5–5.2)
ALP SERPL-CCNC: 59 U/L (ref 55–135)
ALT SERPL W/O P-5'-P-CCNC: 26 U/L (ref 10–44)
ANION GAP SERPL CALC-SCNC: 9 MMOL/L (ref 8–16)
AST SERPL-CCNC: 22 U/L (ref 10–40)
BILIRUB SERPL-MCNC: 0.7 MG/DL (ref 0.1–1)
BUN SERPL-MCNC: 13 MG/DL (ref 6–20)
CALCIUM SERPL-MCNC: 9.8 MG/DL (ref 8.7–10.5)
CHLORIDE SERPL-SCNC: 101 MMOL/L (ref 95–110)
CHOLEST SERPL-MCNC: 120 MG/DL (ref 120–199)
CHOLEST/HDLC SERPL: 3.8 {RATIO} (ref 2–5)
CO2 SERPL-SCNC: 30 MMOL/L (ref 23–29)
CREAT SERPL-MCNC: 1 MG/DL (ref 0.5–1.4)
EST. GFR  (NO RACE VARIABLE): >60 ML/MIN/1.73 M^2
GLUCOSE SERPL-MCNC: 116 MG/DL (ref 70–110)
HDLC SERPL-MCNC: 32 MG/DL (ref 40–75)
HDLC SERPL: 26.7 % (ref 20–50)
LDLC SERPL CALC-MCNC: 49.6 MG/DL (ref 63–159)
NONHDLC SERPL-MCNC: 88 MG/DL
POTASSIUM SERPL-SCNC: 4.8 MMOL/L (ref 3.5–5.1)
PROT SERPL-MCNC: 7.2 G/DL (ref 6–8.4)
SODIUM SERPL-SCNC: 140 MMOL/L (ref 136–145)
TRIGL SERPL-MCNC: 192 MG/DL (ref 30–150)

## 2022-10-28 PROCEDURE — 80053 COMPREHEN METABOLIC PANEL: CPT | Performed by: PEDIATRICS

## 2022-10-28 PROCEDURE — 80061 LIPID PANEL: CPT | Performed by: PEDIATRICS

## 2022-10-28 PROCEDURE — 36415 COLL VENOUS BLD VENIPUNCTURE: CPT | Performed by: PEDIATRICS

## 2022-11-10 ENCOUNTER — OFFICE VISIT (OUTPATIENT)
Dept: INTERNAL MEDICINE | Facility: CLINIC | Age: 52
End: 2022-11-10
Payer: COMMERCIAL

## 2022-11-10 VITALS
OXYGEN SATURATION: 98 % | SYSTOLIC BLOOD PRESSURE: 132 MMHG | HEART RATE: 74 BPM | DIASTOLIC BLOOD PRESSURE: 88 MMHG | BODY MASS INDEX: 32.4 KG/M2 | WEIGHT: 245.56 LBS

## 2022-11-10 DIAGNOSIS — E66.9 OBESITY (BMI 30.0-34.9): ICD-10-CM

## 2022-11-10 DIAGNOSIS — Z12.5 PROSTATE CANCER SCREENING: ICD-10-CM

## 2022-11-10 DIAGNOSIS — I10 HYPERTENSION, UNSPECIFIED TYPE: ICD-10-CM

## 2022-11-10 DIAGNOSIS — R16.2 HEPATOSPLENOMEGALY: ICD-10-CM

## 2022-11-10 DIAGNOSIS — E78.5 HYPERLIPIDEMIA, UNSPECIFIED HYPERLIPIDEMIA TYPE: ICD-10-CM

## 2022-11-10 DIAGNOSIS — M10.9 GOUT OF FOOT, UNSPECIFIED CAUSE, UNSPECIFIED CHRONICITY, UNSPECIFIED LATERALITY: ICD-10-CM

## 2022-11-10 DIAGNOSIS — I10 WHITE COAT SYNDROME WITH DIAGNOSIS OF HYPERTENSION: ICD-10-CM

## 2022-11-10 DIAGNOSIS — F41.1 GENERALIZED ANXIETY DISORDER: ICD-10-CM

## 2022-11-10 DIAGNOSIS — F41.9 ANXIETY: Primary | ICD-10-CM

## 2022-11-10 PROCEDURE — 1160F RVW MEDS BY RX/DR IN RCRD: CPT | Mod: CPTII,S$GLB,, | Performed by: PEDIATRICS

## 2022-11-10 PROCEDURE — 4010F PR ACE/ARB THEARPY RXD/TAKEN: ICD-10-PCS | Mod: CPTII,S$GLB,, | Performed by: PEDIATRICS

## 2022-11-10 PROCEDURE — 3008F PR BODY MASS INDEX (BMI) DOCUMENTED: ICD-10-PCS | Mod: CPTII,S$GLB,, | Performed by: PEDIATRICS

## 2022-11-10 PROCEDURE — 3044F HG A1C LEVEL LT 7.0%: CPT | Mod: CPTII,S$GLB,, | Performed by: PEDIATRICS

## 2022-11-10 PROCEDURE — 99999 PR PBB SHADOW E&M-EST. PATIENT-LVL IV: CPT | Mod: PBBFAC,,, | Performed by: PEDIATRICS

## 2022-11-10 PROCEDURE — 1159F PR MEDICATION LIST DOCUMENTED IN MEDICAL RECORD: ICD-10-PCS | Mod: CPTII,S$GLB,, | Performed by: PEDIATRICS

## 2022-11-10 PROCEDURE — 3075F PR MOST RECENT SYSTOLIC BLOOD PRESS GE 130-139MM HG: ICD-10-PCS | Mod: CPTII,S$GLB,, | Performed by: PEDIATRICS

## 2022-11-10 PROCEDURE — 3079F PR MOST RECENT DIASTOLIC BLOOD PRESSURE 80-89 MM HG: ICD-10-PCS | Mod: CPTII,S$GLB,, | Performed by: PEDIATRICS

## 2022-11-10 PROCEDURE — 3079F DIAST BP 80-89 MM HG: CPT | Mod: CPTII,S$GLB,, | Performed by: PEDIATRICS

## 2022-11-10 PROCEDURE — 4010F ACE/ARB THERAPY RXD/TAKEN: CPT | Mod: CPTII,S$GLB,, | Performed by: PEDIATRICS

## 2022-11-10 PROCEDURE — 99214 PR OFFICE/OUTPT VISIT, EST, LEVL IV, 30-39 MIN: ICD-10-PCS | Mod: S$GLB,,, | Performed by: PEDIATRICS

## 2022-11-10 PROCEDURE — 3075F SYST BP GE 130 - 139MM HG: CPT | Mod: CPTII,S$GLB,, | Performed by: PEDIATRICS

## 2022-11-10 PROCEDURE — 99999 PR PBB SHADOW E&M-EST. PATIENT-LVL IV: ICD-10-PCS | Mod: PBBFAC,,, | Performed by: PEDIATRICS

## 2022-11-10 PROCEDURE — 99214 OFFICE O/P EST MOD 30 MIN: CPT | Mod: S$GLB,,, | Performed by: PEDIATRICS

## 2022-11-10 PROCEDURE — 1160F PR REVIEW ALL MEDS BY PRESCRIBER/CLIN PHARMACIST DOCUMENTED: ICD-10-PCS | Mod: CPTII,S$GLB,, | Performed by: PEDIATRICS

## 2022-11-10 PROCEDURE — 3008F BODY MASS INDEX DOCD: CPT | Mod: CPTII,S$GLB,, | Performed by: PEDIATRICS

## 2022-11-10 PROCEDURE — 1159F MED LIST DOCD IN RCRD: CPT | Mod: CPTII,S$GLB,, | Performed by: PEDIATRICS

## 2022-11-10 PROCEDURE — 3044F PR MOST RECENT HEMOGLOBIN A1C LEVEL <7.0%: ICD-10-PCS | Mod: CPTII,S$GLB,, | Performed by: PEDIATRICS

## 2022-11-10 RX ORDER — HYDROCHLOROTHIAZIDE 25 MG/1
25 TABLET ORAL DAILY
Qty: 90 TABLET | Refills: 3 | Status: SHIPPED | OUTPATIENT
Start: 2022-11-10 | End: 2023-09-27 | Stop reason: SDUPTHER

## 2022-11-10 RX ORDER — LOSARTAN POTASSIUM 100 MG/1
100 TABLET ORAL DAILY
Qty: 90 TABLET | Refills: 3 | Status: SHIPPED | OUTPATIENT
Start: 2022-11-10 | End: 2023-09-27 | Stop reason: SDUPTHER

## 2022-11-10 RX ORDER — LORAZEPAM 0.5 MG/1
0.5 TABLET ORAL
Qty: 30 TABLET | Refills: 0 | Status: SHIPPED | OUTPATIENT
Start: 2022-11-10 | End: 2023-05-09 | Stop reason: SDUPTHER

## 2022-11-10 RX ORDER — METOPROLOL SUCCINATE 25 MG/1
TABLET, EXTENDED RELEASE ORAL
Qty: 135 TABLET | Refills: 3 | Status: SHIPPED | OUTPATIENT
Start: 2022-11-10 | End: 2023-09-27 | Stop reason: SDUPTHER

## 2022-11-10 NOTE — PROGRESS NOTES
Subjective:       Patient ID: Sae Santiago is a 52 y.o. male.    Chief Complaint: Follow-up (6 months)    Sae Santiago is a 52 y.o. male who presents to clinic for a follow up    1) HTN: B/P good, no HTNive symptoms. Is home monitoring. Dig HTN med entries reviewed. Thinks the episodic increased BP is stress induced.   2) Anxiety: buspar intermittently working, has been taking 5mg 3x/day and 2.5mg PRN at night to help sleep, avoids Ativan use as much as possible, wife agrees he is doing quiet well. [place] shows no Ativan refill in 2 years  3) Gout: last episode 2015, using tart cherry  4) Obesity: not following D&E, weight is up  5) Hyperlipidemia: not following D&E, is on 20mg crestor   6) Varicosities of leg: quiet    LABS REVIEWED AND DISCUSSED WITH PATIENT: CMP and lipids     Review of Systems   Constitutional:  Negative for activity change, appetite change, chills, diaphoresis, fatigue, fever and unexpected weight change.   HENT:  Negative for nasal congestion, ear pain, mouth sores, nosebleeds, postnasal drip, rhinorrhea, sneezing and sore throat.    Eyes:  Negative for photophobia, pain, discharge, redness and visual disturbance.   Respiratory:  Negative for cough, chest tightness, shortness of breath, wheezing and stridor.    Cardiovascular:  Negative for chest pain, palpitations and leg swelling.   Gastrointestinal:  Negative for abdominal distention, blood in stool, constipation, diarrhea, nausea and vomiting.   Genitourinary:  Negative for decreased urine volume, difficulty urinating, dysuria, flank pain, frequency, genital sores, hematuria and urgency.   Musculoskeletal:  Negative for arthralgias, back pain, joint swelling, neck pain and neck stiffness.   Integumentary:  Negative for color change, pallor, rash and wound.   Neurological:  Negative for dizziness, syncope, speech difficulty, weakness, light-headedness and headaches.   Hematological:  Negative for adenopathy. Does not  bruise/bleed easily.   Psychiatric/Behavioral:  Negative for confusion, decreased concentration, dysphoric mood, hallucinations, sleep disturbance and suicidal ideas. The patient is not nervous/anxious.    All other systems reviewed and are negative.      Objective:      Physical Exam  Vitals and nursing note reviewed.   Constitutional:       General: He is not in acute distress.     Appearance: He is well-developed.   Neck:      Thyroid: No thyromegaly.      Vascular: No JVD.   Cardiovascular:      Rate and Rhythm: Normal rate and regular rhythm.      Heart sounds: Normal heart sounds. No murmur heard.  Pulmonary:      Effort: Pulmonary effort is normal. No respiratory distress.      Breath sounds: Normal breath sounds. No wheezing or rales.   Abdominal:      General: There is no distension.      Palpations: Abdomen is soft. There is no mass.      Tenderness: There is no abdominal tenderness. There is no guarding.   Musculoskeletal:      Right lower leg: No edema.      Left lower leg: No edema.   Lymphadenopathy:      Cervical: No cervical adenopathy.   Skin:     Capillary Refill: Capillary refill takes less than 2 seconds.      Findings: No rash.   Neurological:      General: No focal deficit present.      Mental Status: He is alert and oriented to person, place, and time.      Cranial Nerves: No cranial nerve deficit.      Coordination: Coordination normal.   Psychiatric:         Mood and Affect: Mood normal.         Behavior: Behavior normal.         Thought Content: Thought content normal.         Judgment: Judgment normal.       Assessment:       Problem List Items Addressed This Visit       Anxiety - Primary    Gout    Hepatosplenomegaly    HTN (hypertension)    Relevant Medications    metoprolol succinate (TOPROL-XL) 25 MG 24 hr tablet    losartan (COZAAR) 100 MG tablet    hydroCHLOROthiazide (HYDRODIURIL) 25 MG tablet    Hyperlipidemia    Relevant Orders    Lipid Panel    Comprehensive Metabolic Panel     Obesity (BMI 30.0-34.9)     Other Visit Diagnoses       Generalized anxiety disorder        Relevant Medications    LORazepam (ATIVAN) 0.5 MG tablet    White coat syndrome with diagnosis of hypertension        Relevant Medications    metoprolol succinate (TOPROL-XL) 25 MG 24 hr tablet    losartan (COZAAR) 100 MG tablet    hydroCHLOROthiazide (HYDRODIURIL) 25 MG tablet    Prostate cancer screening        Relevant Orders    PSA, Screening            Plan:     Anxiety    Generalized anxiety disorder  -     LORazepam (ATIVAN) 0.5 MG tablet; Take 1 tablet (0.5 mg total) by mouth as needed for Anxiety.  Dispense: 30 tablet; Refill: 0    White coat syndrome with diagnosis of hypertension  -     metoprolol succinate (TOPROL-XL) 25 MG 24 hr tablet; TAKE 1 and 1/2 (ONE & ONE-HALF) TABLETS BY MOUTH AT NIGHT  Dispense: 135 tablet; Refill: 3  -     losartan (COZAAR) 100 MG tablet; Take 1 tablet (100 mg total) by mouth once daily.  Dispense: 90 tablet; Refill: 3  -     hydroCHLOROthiazide (HYDRODIURIL) 25 MG tablet; Take 1 tablet (25 mg total) by mouth once daily.  Dispense: 90 tablet; Refill: 3    Gout of foot, unspecified cause, unspecified chronicity, unspecified laterality    Hepatosplenomegaly    Hypertension, unspecified type    Hyperlipidemia, unspecified hyperlipidemia type  -     Lipid Panel; Future; Expected date: 11/10/2022  -     Comprehensive Metabolic Panel; Future; Expected date: 11/10/2022    Obesity (BMI 30.0-34.9)    Prostate cancer screening  -     PSA, Screening; Future; Expected date: 11/10/2022      BP at goal. Lipids near goal. Anxiety controlled. Maintain meds. Pt will work on D&E prior to increasing crestor. Follow up in 6mo.     Scribe Attestation:   Moris MARTINEZ, am scribing for, and in the presence of, Dr. Librado Reyes Jr. I performed the above scribed service and the documentation accurately describes the services I performed. I attest to the accuracy of the note.    I, Dr. Librado Nogueira  Amy Mccoy, reviewed documentation as scribed above. I personally performed the services described in this documentation.  I agree that the record reflects my personal performance and is accurate and complete. iLbrado Reyes Jr., MD.  11/10/2022

## 2023-02-27 ENCOUNTER — TELEPHONE (OUTPATIENT)
Dept: DERMATOLOGY | Facility: CLINIC | Age: 53
End: 2023-02-27
Payer: COMMERCIAL

## 2023-02-27 ENCOUNTER — PATIENT MESSAGE (OUTPATIENT)
Dept: DERMATOLOGY | Facility: CLINIC | Age: 53
End: 2023-02-27
Payer: COMMERCIAL

## 2023-02-27 NOTE — TELEPHONE ENCOUNTER
Called pt to let him know  is out of office at this time. I let pt know that will send his message over to a colleague.

## 2023-05-02 ENCOUNTER — LAB VISIT (OUTPATIENT)
Dept: LAB | Facility: HOSPITAL | Age: 53
End: 2023-05-02
Attending: PEDIATRICS
Payer: COMMERCIAL

## 2023-05-02 DIAGNOSIS — E78.5 HYPERLIPIDEMIA, UNSPECIFIED HYPERLIPIDEMIA TYPE: ICD-10-CM

## 2023-05-02 DIAGNOSIS — Z12.5 PROSTATE CANCER SCREENING: ICD-10-CM

## 2023-05-02 LAB
ALBUMIN SERPL BCP-MCNC: 4.1 G/DL (ref 3.5–5.2)
ALP SERPL-CCNC: 62 U/L (ref 55–135)
ALT SERPL W/O P-5'-P-CCNC: 45 U/L (ref 10–44)
ANION GAP SERPL CALC-SCNC: 6 MMOL/L (ref 8–16)
AST SERPL-CCNC: 42 U/L (ref 10–40)
BILIRUB SERPL-MCNC: 0.8 MG/DL (ref 0.1–1)
BUN SERPL-MCNC: 11 MG/DL (ref 6–20)
CALCIUM SERPL-MCNC: 9.9 MG/DL (ref 8.7–10.5)
CHLORIDE SERPL-SCNC: 100 MMOL/L (ref 95–110)
CHOLEST SERPL-MCNC: 139 MG/DL (ref 120–199)
CHOLEST/HDLC SERPL: 3.9 {RATIO} (ref 2–5)
CO2 SERPL-SCNC: 31 MMOL/L (ref 23–29)
COMPLEXED PSA SERPL-MCNC: 1.1 NG/ML (ref 0–4)
CREAT SERPL-MCNC: 1.2 MG/DL (ref 0.5–1.4)
EST. GFR  (NO RACE VARIABLE): >60 ML/MIN/1.73 M^2
GLUCOSE SERPL-MCNC: 128 MG/DL (ref 70–110)
HDLC SERPL-MCNC: 36 MG/DL (ref 40–75)
HDLC SERPL: 25.9 % (ref 20–50)
LDLC SERPL CALC-MCNC: 55 MG/DL (ref 63–159)
NONHDLC SERPL-MCNC: 103 MG/DL
POTASSIUM SERPL-SCNC: 4.1 MMOL/L (ref 3.5–5.1)
PROT SERPL-MCNC: 7.2 G/DL (ref 6–8.4)
SODIUM SERPL-SCNC: 137 MMOL/L (ref 136–145)
TRIGL SERPL-MCNC: 240 MG/DL (ref 30–150)

## 2023-05-02 PROCEDURE — 80053 COMPREHEN METABOLIC PANEL: CPT | Performed by: PEDIATRICS

## 2023-05-02 PROCEDURE — 36415 COLL VENOUS BLD VENIPUNCTURE: CPT | Performed by: PEDIATRICS

## 2023-05-02 PROCEDURE — 84153 ASSAY OF PSA TOTAL: CPT | Performed by: PEDIATRICS

## 2023-05-02 PROCEDURE — 80061 LIPID PANEL: CPT | Performed by: PEDIATRICS

## 2023-05-04 ENCOUNTER — OFFICE VISIT (OUTPATIENT)
Dept: DERMATOLOGY | Facility: CLINIC | Age: 53
End: 2023-05-04
Payer: COMMERCIAL

## 2023-05-04 DIAGNOSIS — D18.00 HEMANGIOMA, UNSPECIFIED SITE: ICD-10-CM

## 2023-05-04 DIAGNOSIS — L82.1 SEBORRHEIC KERATOSES: ICD-10-CM

## 2023-05-04 DIAGNOSIS — L57.0 ACTINIC KERATOSES: Primary | ICD-10-CM

## 2023-05-04 DIAGNOSIS — E78.2 ELEVATED TRIGLYCERIDES WITH HIGH CHOLESTEROL: Primary | ICD-10-CM

## 2023-05-04 DIAGNOSIS — D22.9 MULTIPLE BENIGN NEVI: ICD-10-CM

## 2023-05-04 DIAGNOSIS — Z12.83 SKIN CANCER SCREENING: ICD-10-CM

## 2023-05-04 DIAGNOSIS — B35.6 TINEA CRURIS: ICD-10-CM

## 2023-05-04 DIAGNOSIS — L91.8 ACROCHORDON: ICD-10-CM

## 2023-05-04 DIAGNOSIS — L81.4 SOLAR LENTIGO: ICD-10-CM

## 2023-05-04 PROCEDURE — 1160F PR REVIEW ALL MEDS BY PRESCRIBER/CLIN PHARMACIST DOCUMENTED: ICD-10-PCS | Mod: CPTII,S$GLB,, | Performed by: STUDENT IN AN ORGANIZED HEALTH CARE EDUCATION/TRAINING PROGRAM

## 2023-05-04 PROCEDURE — 99213 OFFICE O/P EST LOW 20 MIN: CPT | Mod: 25,S$GLB,, | Performed by: STUDENT IN AN ORGANIZED HEALTH CARE EDUCATION/TRAINING PROGRAM

## 2023-05-04 PROCEDURE — 4010F PR ACE/ARB THEARPY RXD/TAKEN: ICD-10-PCS | Mod: CPTII,S$GLB,, | Performed by: STUDENT IN AN ORGANIZED HEALTH CARE EDUCATION/TRAINING PROGRAM

## 2023-05-04 PROCEDURE — 4010F ACE/ARB THERAPY RXD/TAKEN: CPT | Mod: CPTII,S$GLB,, | Performed by: STUDENT IN AN ORGANIZED HEALTH CARE EDUCATION/TRAINING PROGRAM

## 2023-05-04 PROCEDURE — 1159F MED LIST DOCD IN RCRD: CPT | Mod: CPTII,S$GLB,, | Performed by: STUDENT IN AN ORGANIZED HEALTH CARE EDUCATION/TRAINING PROGRAM

## 2023-05-04 PROCEDURE — 99999 PR PBB SHADOW E&M-EST. PATIENT-LVL III: CPT | Mod: PBBFAC,,, | Performed by: STUDENT IN AN ORGANIZED HEALTH CARE EDUCATION/TRAINING PROGRAM

## 2023-05-04 PROCEDURE — 99213 PR OFFICE/OUTPT VISIT, EST, LEVL III, 20-29 MIN: ICD-10-PCS | Mod: 25,S$GLB,, | Performed by: STUDENT IN AN ORGANIZED HEALTH CARE EDUCATION/TRAINING PROGRAM

## 2023-05-04 PROCEDURE — 17000 DESTRUCT PREMALG LESION: CPT | Mod: S$GLB,,, | Performed by: STUDENT IN AN ORGANIZED HEALTH CARE EDUCATION/TRAINING PROGRAM

## 2023-05-04 PROCEDURE — 1160F RVW MEDS BY RX/DR IN RCRD: CPT | Mod: CPTII,S$GLB,, | Performed by: STUDENT IN AN ORGANIZED HEALTH CARE EDUCATION/TRAINING PROGRAM

## 2023-05-04 PROCEDURE — 17000 PR DESTRUCTION(LASER SURGERY,CRYOSURGERY,CHEMOSURGERY),PREMALIGNANT LESIONS,FIRST LESION: ICD-10-PCS | Mod: S$GLB,,, | Performed by: STUDENT IN AN ORGANIZED HEALTH CARE EDUCATION/TRAINING PROGRAM

## 2023-05-04 PROCEDURE — 1159F PR MEDICATION LIST DOCUMENTED IN MEDICAL RECORD: ICD-10-PCS | Mod: CPTII,S$GLB,, | Performed by: STUDENT IN AN ORGANIZED HEALTH CARE EDUCATION/TRAINING PROGRAM

## 2023-05-04 PROCEDURE — 17003 DESTRUCT PREMALG LES 2-14: CPT | Mod: S$GLB,,, | Performed by: STUDENT IN AN ORGANIZED HEALTH CARE EDUCATION/TRAINING PROGRAM

## 2023-05-04 PROCEDURE — 99999 PR PBB SHADOW E&M-EST. PATIENT-LVL III: ICD-10-PCS | Mod: PBBFAC,,, | Performed by: STUDENT IN AN ORGANIZED HEALTH CARE EDUCATION/TRAINING PROGRAM

## 2023-05-04 PROCEDURE — 17003 DESTRUCTION, PREMALIGNANT LESIONS; SECOND THROUGH 14 LESIONS: ICD-10-PCS | Mod: S$GLB,,, | Performed by: STUDENT IN AN ORGANIZED HEALTH CARE EDUCATION/TRAINING PROGRAM

## 2023-05-04 RX ORDER — FLUCONAZOLE 150 MG/1
150 TABLET ORAL WEEKLY
Qty: 6 TABLET | Refills: 0 | Status: SHIPPED | OUTPATIENT
Start: 2023-05-04 | End: 2023-06-19

## 2023-05-04 NOTE — PATIENT INSTRUCTIONS

## 2023-05-04 NOTE — PROGRESS NOTES
Patient Information  Name: Sae Santiago  : 1970  MRN: [id number]     Referring Physician:  Dr. Metcalf ref. provider found   Primary Care Physician:  Dr. SKYE Reyes Jr, MD   Date of Visit: 2023      Subjective:       Sae Santiago is a 52 y.o. male who presents for   Chief Complaint   Patient presents with    Skin Check     FBSE, family hx of skin cancer      HPI  Pt here for skin check. Patient denies personal hx of skin cancers. Patient denies family hx of melanoma. Patient reports a hx of strong sun exposure in the past.    He also has hx of tinea cruris. Has been using clotrimazole with recurrence.   Patient was last seen:Visit date not found     Prior notes by myself reviewed.   Clinical documentation obtained by nursing staff reviewed.    Review of Systems   Skin:  Positive for recent sunburn. Negative for itching and rash.      Objective:    Physical Exam   Constitutional: He appears well-developed and well-nourished. No distress.   Neurological: He is alert and oriented to person, place, and time. He is not disoriented.   Psychiatric: He has a normal mood and affect.   Skin:   Areas Examined (abnormalities noted in diagram):   Scalp / Hair Palpated and Inspected  Head / Face Inspection Performed  Neck Inspection Performed  Chest / Axilla Inspection Performed  Abdomen Inspection Performed  Genitals / Buttocks / Groin Inspection Performed  Back Inspection Performed  RUE Inspected  LUE Inspection Performed  RLE Inspected  LLE Inspection Performed  Nails and Digits Inspection Performed                     Diagram Legend     Erythematous scaling macule/papule c/w actinic keratosis       Vascular papule c/w angioma      Pigmented verrucoid papule/plaque c/w seborrheic keratosis      Yellow umbilicated papule c/w sebaceous hyperplasia      Irregularly shaped tan macule c/w lentigo     1-2 mm smooth white papules consistent with Milia      Movable subcutaneous cyst with punctum c/w  epidermal inclusion cyst      Subcutaneous movable cyst c/w pilar cyst      Firm pink to brown papule c/w dermatofibroma      Pedunculated fleshy papule(s) c/w skin tag(s)      Evenly pigmented macule c/w junctional nevus     Mildly variegated pigmented, slightly irregular-bordered macule c/w mildly atypical nevus      Flesh colored to evenly pigmented papule c/w intradermal nevus       Pink pearly papule/plaque c/w basal cell carcinoma      Erythematous hyperkeratotic cursted plaque c/w SCC      Surgical scar with no sign of skin cancer recurrence      Open and closed comedones      Inflammatory papules and pustules      Verrucoid papule consistent consistent with wart     Erythematous eczematous patches and plaques     Dystrophic onycholytic nail with subungual debris c/w onychomycosis     Umbilicated papule    Erythematous-base heme-crusted tan verrucoid plaque consistent with inflamed seborrheic keratosis     Erythematous Silvery Scaling Plaque c/w Psoriasis     See annotation      No images are attached to the encounter or orders placed in the encounter.    [] Data reviewed  [] Independent review of test  [] Management discussed with another provider    Assessment / Plan:        Actinic keratoses  Cryosurgery Procedure Note    Verbal consent from the patient is obtained including, but not limited to, risk of hypopigmentation/hyperpigmentation, scar, recurrence of lesion. The patient is aware of the precancerous quality and need for treatment of these lesions. Liquid nitrogen cryosurgery is applied to the 7 actinic keratoses, as detailed in the physical exam, to produce a freeze injury. The patient is aware that blisters may form and is instructed on wound care with gentle cleansing and use of vaseline ointment to keep moist until healed. The patient is supplied a handout on cryosurgery and is instructed to call if lesions do not completely resolve.    Seborrheic keratoses  These are benign inherited growths  without a malignant potential. Reassurance given to patient. No treatment is necessary.     Skin cancer screening  Total body skin examination performed today including at least 12 points as noted in physical examination. No lesions suspicious for malignancy noted.    Recommend daily sun protection/avoidance, use of at least SPF 30, broad spectrum sunscreen (OTC drug), skin self examinations, and routine physician surveillance to optimize early detection    Solar lentigo  This is a benign hyperpigmented sun induced lesion. Recommend daily sun protection/avoidance and use of at least SPF 30, broad spectrum sunscreen (OTC drug) will reduce the number of new lesions. Treatment of these benign lesions are considered cosmetic.    Multiple benign nevi  Discussed ABCDE's of nevi.  Monitor for new mole or moles that are becoming bigger, darker, irritated, or developing irregular borders. Brochure provided. Instructed patient to observe lesion(s) for changes and follow up in clinic if changes are noted. Patient to monitor skin at home for new or changing lesions.     Hemangioma, unspecified site  This is a benign vascular lesion. Reassurance given. No treatment required.     Acrochordon   - minor problem and chronic.   Reassurance given to patient. No treatment necessary.                LOS NUMBER AND COMPLEXITY OF PROBLEMS    COMPLEXITY OF DATA RISK TOTAL TIME (m)   98086  [id number] [] 1 self-limited or minor problem [x] Minimal to none [] No treatment recommended or patient to monitor 15-29  10-19   93059  36048 Low  [] 2 or > self limited or minor problems  [] 1 stable chronic illness  [] 1 acute, uncomplicated illness or injury Limited (2)  [] Prior external notes from each unique source  [] Review result of each unique test  [] Order each unique test [x]  Low  OTC medications, minor skin biopsy 30-44 20-29   71464  35992 Moderate  []  1 or > chronic illness with progression, exacerbation or SE of treatment  [x]  2 or more  stable chronic illnesses  []  1 acute illness with systemic symptoms  []  1 acute complicated injury  []  1 undiagnosed new problem with uncertain prognosis Moderate (1/3 below)  []  3 or more data items        *Now includes assessment requiring independent historian  []  Independent interpretation of a test  []  Discuss management/test with another provider Moderate  []  Prescription drug mgmt  []  Minor surgery with risk discussed  []  Mgmt limited by social determinates 45-59  30-39   11356  46537 High  []  1 or more chronic illness with severe exacerbation, progression or SE of treatment  []  1 acute or chronic illness/injury that poses a threat to life or bodily function Extensive (2/3 below)  []  3 or more data items        *Now includes assessment requiring independent historian.  []  Independent interpretation of a test  []  Discuss management/test with another provider High  []  Major surgery with risk discussed  []  Drug therapy requiring intensive monitoring for toxicity  []  Hospitalization  []  Decision for DNR 60-74  40-54      No follow-ups on file.    Michelle Trujillo MD, FAAD  [facility]

## 2023-05-09 DIAGNOSIS — F41.1 GENERALIZED ANXIETY DISORDER: ICD-10-CM

## 2023-05-09 RX ORDER — LORAZEPAM 0.5 MG/1
0.5 TABLET ORAL
Qty: 30 TABLET | Refills: 0 | Status: ON HOLD | OUTPATIENT
Start: 2023-05-09 | End: 2023-06-05 | Stop reason: HOSPADM

## 2023-05-09 NOTE — TELEPHONE ENCOUNTER
No care due was identified.  [facility] Embedded Care Due Messages. Reference number: [id number].   5/09/2023 10:17:29 AM CDT

## 2023-05-09 NOTE — TELEPHONE ENCOUNTER
Refill Routing Note   Medication(s) are not appropriate for processing by [facility] for the following reason(s):      Medication outside of protocol    [facility] action(s):  Route Care Due:  None identified          Appointments  past 12m or future 3m with PCP    Date Provider   Last Visit   11/10/2022 IVAN Reyes Jr., MD   Next Visit   Visit date not found IVAN Reyes Jr., MD   ED visits in past 90 days: 0        Note composed:10:22 AM 05/09/2023

## 2023-06-03 ENCOUNTER — HOSPITAL ENCOUNTER (OUTPATIENT)
Facility: HOSPITAL | Age: 53
Discharge: HOME | End: 2023-06-05
Attending: EMERGENCY MEDICINE | Admitting: INTERNAL MEDICINE
Payer: COMMERCIAL

## 2023-06-03 DIAGNOSIS — R07.9 CHEST PAIN: ICD-10-CM

## 2023-06-03 DIAGNOSIS — G45.9 TIA (TRANSIENT ISCHEMIC ATTACK): ICD-10-CM

## 2023-06-03 DIAGNOSIS — R42 DIZZINESS: ICD-10-CM

## 2023-06-03 DIAGNOSIS — R42 VERTIGO: Primary | ICD-10-CM

## 2023-06-03 PROBLEM — F41.1 GAD (GENERALIZED ANXIETY DISORDER): Status: ACTIVE | Noted: 2023-06-03

## 2023-06-03 LAB
ALBUMIN SERPL BCP-MCNC: 4.3 G/DL (ref 3.5–5.2)
ALLENS TEST: ABNORMAL
ALP SERPL-CCNC: 56 U/L (ref 55–135)
ALT SERPL W/O P-5'-P-CCNC: 39 U/L (ref 10–44)
AMPHET+METHAMPHET UR QL: NEGATIVE
ANION GAP SERPL CALC-SCNC: 13 MMOL/L (ref 8–16)
AST SERPL-CCNC: 31 U/L (ref 10–40)
BARBITURATES UR QL SCN>200 NG/ML: NEGATIVE
BASOPHILS # BLD AUTO: 0.03 K/UL (ref 0–0.2)
BASOPHILS NFR BLD: 0.4 % (ref 0–1.9)
BENZODIAZ UR QL SCN>200 NG/ML: NEGATIVE
BILIRUB SERPL-MCNC: 0.6 MG/DL (ref 0.1–1)
BILIRUB UR QL STRIP: NEGATIVE
BILIRUB UR QL STRIP: NEGATIVE
BNP SERPL-MCNC: 35 PG/ML (ref 0–99)
BUN SERPL-MCNC: 22 MG/DL (ref 6–20)
BZE UR QL SCN: NEGATIVE
CALCIUM SERPL-MCNC: 9.5 MG/DL (ref 8.7–10.5)
CANNABINOIDS UR QL SCN: NEGATIVE
CARBOXYHEMOGLOBIN: 1.1 % (ref 1.5–5)
CHLORIDE SERPL-SCNC: 100 MMOL/L (ref 95–110)
CLARITY UR: CLEAR
CLARITY UR: CLEAR
CO2 SERPL-SCNC: 27 MMOL/L (ref 23–29)
COLOR UR: YELLOW
COLOR UR: YELLOW
CREAT SERPL-MCNC: 1.1 MG/DL (ref 0.5–1.4)
CREAT UR-MCNC: 136.2 MG/DL (ref 23–375)
DELSYS: ABNORMAL
DIFFERENTIAL METHOD: ABNORMAL
EOSINOPHIL # BLD AUTO: 0 K/UL (ref 0–0.5)
EOSINOPHIL NFR BLD: 0.5 % (ref 0–8)
ERYTHROCYTE [DISTWIDTH] IN BLOOD BY AUTOMATED COUNT: 12.3 % (ref 11.5–14.5)
EST. GFR  (NO RACE VARIABLE): >60 ML/MIN/1.73 M^2
GLUCOSE SERPL-MCNC: 186 MG/DL (ref 70–110)
GLUCOSE UR QL STRIP: NEGATIVE
GLUCOSE UR QL STRIP: NEGATIVE
HCO3 UR-SCNC: 28.7 MMOL/L (ref 24–28)
HCT VFR BLD AUTO: 45.2 % (ref 40–54)
HGB BLD-MCNC: 15.1 G/DL (ref 14–18)
HGB UR QL STRIP: NEGATIVE
HGB UR QL STRIP: NEGATIVE
IMM GRANULOCYTES # BLD AUTO: 0.02 K/UL (ref 0–0.04)
IMM GRANULOCYTES NFR BLD AUTO: 0.2 % (ref 0–0.5)
KETONES UR QL STRIP: NEGATIVE
KETONES UR QL STRIP: NEGATIVE
LEUKOCYTE ESTERASE UR QL STRIP: NEGATIVE
LEUKOCYTE ESTERASE UR QL STRIP: NEGATIVE
LIPASE SERPL-CCNC: 11 U/L (ref 4–60)
LYMPHOCYTES # BLD AUTO: 1.5 K/UL (ref 1–4.8)
LYMPHOCYTES NFR BLD: 17.6 % (ref 18–48)
MCH RBC QN AUTO: 27.8 PG (ref 27–31)
MCHC RBC AUTO-ENTMCNC: 33.4 G/DL (ref 32–36)
MCV RBC AUTO: 83 FL (ref 82–98)
METHADONE UR QL SCN>300 NG/ML: NEGATIVE
MODE: ABNORMAL
MONOCYTES # BLD AUTO: 0.4 K/UL (ref 0.3–1)
MONOCYTES NFR BLD: 5.1 % (ref 4–15)
NEUTROPHILS # BLD AUTO: 6.3 K/UL (ref 1.8–7.7)
NEUTROPHILS NFR BLD: 76.2 % (ref 38–73)
NITRITE UR QL STRIP: NEGATIVE
NITRITE UR QL STRIP: NEGATIVE
NRBC BLD-RTO: 0 /100 WBC
OPIATES UR QL SCN: NEGATIVE
PCO2 BLDA: 50.2 MMHG (ref 35–45)
PCP UR QL SCN>25 NG/ML: NEGATIVE
PH SMN: 7.37 [PH] (ref 7.35–7.45)
PH UR STRIP: 6 [PH] (ref 5–8)
PH UR STRIP: 6 [PH] (ref 5–8)
PLATELET # BLD AUTO: 243 K/UL (ref 150–450)
PMV BLD AUTO: 10 FL (ref 9.2–12.9)
PO2 BLDA: 32 MMHG (ref 40–60)
POC BE: 3 MMOL/L
POC SATURATED O2: 58 % (ref 95–100)
POTASSIUM SERPL-SCNC: 4.1 MMOL/L (ref 3.5–5.1)
PROT SERPL-MCNC: 7.5 G/DL (ref 6–8.4)
PROT UR QL STRIP: NEGATIVE
PROT UR QL STRIP: NEGATIVE
RBC # BLD AUTO: 5.44 M/UL (ref 4.6–6.2)
SAMPLE: ABNORMAL
SITE: ABNORMAL
SODIUM SERPL-SCNC: 140 MMOL/L (ref 136–145)
SP GR UR STRIP: 1.02 (ref 1–1.03)
SP GR UR STRIP: 1.02 (ref 1–1.03)
TOXICOLOGY INFORMATION: NORMAL
TROPONIN I SERPL DL<=0.01 NG/ML-MCNC: <0.006 NG/ML (ref 0–0.03)
TROPONIN I SERPL DL<=0.01 NG/ML-MCNC: <0.006 NG/ML (ref 0–0.03)
TSH SERPL DL<=0.005 MIU/L-ACNC: 0.55 UIU/ML (ref 0.4–4)
URN SPEC COLLECT METH UR: NORMAL
URN SPEC COLLECT METH UR: NORMAL
UROBILINOGEN UR STRIP-ACNC: NEGATIVE EU/DL
UROBILINOGEN UR STRIP-ACNC: NEGATIVE EU/DL
WBC # BLD AUTO: 8.3 K/UL (ref 3.9–12.7)

## 2023-06-03 PROCEDURE — G0378 HOSPITAL OBSERVATION PER HR: HCPCS

## 2023-06-03 PROCEDURE — 25000003 PHARM REV CODE 250: Performed by: INTERNAL MEDICINE

## 2023-06-03 PROCEDURE — 80053 COMPREHEN METABOLIC PANEL: CPT | Performed by: EMERGENCY MEDICINE

## 2023-06-03 PROCEDURE — 99900035 HC TECH TIME PER 15 MIN (STAT)

## 2023-06-03 PROCEDURE — 96361 HYDRATE IV INFUSION ADD-ON: CPT

## 2023-06-03 PROCEDURE — 80307 DRUG TEST PRSMV CHEM ANLYZR: CPT | Performed by: EMERGENCY MEDICINE

## 2023-06-03 PROCEDURE — 93010 ELECTROCARDIOGRAM REPORT: CPT | Mod: ,,, | Performed by: INTERNAL MEDICINE

## 2023-06-03 PROCEDURE — 82375 ASSAY CARBOXYHB QUANT: CPT | Performed by: EMERGENCY MEDICINE

## 2023-06-03 PROCEDURE — 93010 EKG 12-LEAD: ICD-10-PCS | Mod: ,,, | Performed by: INTERNAL MEDICINE

## 2023-06-03 PROCEDURE — 96374 THER/PROPH/DIAG INJ IV PUSH: CPT

## 2023-06-03 PROCEDURE — 83690 ASSAY OF LIPASE: CPT | Performed by: EMERGENCY MEDICINE

## 2023-06-03 PROCEDURE — 25000003 PHARM REV CODE 250: Performed by: EMERGENCY MEDICINE

## 2023-06-03 PROCEDURE — 82375 ASSAY CARBOXYHB QUANT: CPT

## 2023-06-03 PROCEDURE — 81003 URINALYSIS AUTO W/O SCOPE: CPT | Mod: 91,59 | Performed by: INTERNAL MEDICINE

## 2023-06-03 PROCEDURE — 93005 ELECTROCARDIOGRAM TRACING: CPT

## 2023-06-03 PROCEDURE — 84484 ASSAY OF TROPONIN QUANT: CPT | Performed by: EMERGENCY MEDICINE

## 2023-06-03 PROCEDURE — 85025 COMPLETE CBC W/AUTO DIFF WBC: CPT | Performed by: EMERGENCY MEDICINE

## 2023-06-03 PROCEDURE — 81003 URINALYSIS AUTO W/O SCOPE: CPT | Performed by: EMERGENCY MEDICINE

## 2023-06-03 PROCEDURE — 83880 ASSAY OF NATRIURETIC PEPTIDE: CPT | Performed by: EMERGENCY MEDICINE

## 2023-06-03 PROCEDURE — 84443 ASSAY THYROID STIM HORMONE: CPT | Performed by: INTERNAL MEDICINE

## 2023-06-03 PROCEDURE — 99285 EMERGENCY DEPT VISIT HI MDM: CPT | Mod: 25

## 2023-06-03 PROCEDURE — 63600175 PHARM REV CODE 636 W HCPCS: Performed by: INTERNAL MEDICINE

## 2023-06-03 RX ORDER — SODIUM CHLORIDE 9 MG/ML
INJECTION, SOLUTION INTRAVENOUS CONTINUOUS
Status: ACTIVE | OUTPATIENT
Start: 2023-06-03 | End: 2023-06-04

## 2023-06-03 RX ORDER — MECLIZINE HYDROCHLORIDE 25 MG/1
50 TABLET ORAL
Status: COMPLETED | OUTPATIENT
Start: 2023-06-03 | End: 2023-06-03

## 2023-06-03 RX ORDER — SODIUM CHLORIDE 0.9 % (FLUSH) 0.9 %
10 SYRINGE (ML) INJECTION
Status: DISCONTINUED | OUTPATIENT
Start: 2023-06-03 | End: 2023-06-05 | Stop reason: HOSPADM

## 2023-06-03 RX ORDER — LORAZEPAM 2 MG/ML
1 INJECTION INTRAMUSCULAR ONCE AS NEEDED
Status: DISCONTINUED | OUTPATIENT
Start: 2023-06-04 | End: 2023-06-05

## 2023-06-03 RX ORDER — MAG HYDROX/ALUMINUM HYD/SIMETH 200-200-20
30 SUSPENSION, ORAL (FINAL DOSE FORM) ORAL EVERY 6 HOURS PRN
Status: DISCONTINUED | OUTPATIENT
Start: 2023-06-03 | End: 2023-06-05 | Stop reason: HOSPADM

## 2023-06-03 RX ORDER — MECLIZINE HYDROCHLORIDE 25 MG/1
25 TABLET ORAL 3 TIMES DAILY PRN
Status: DISCONTINUED | OUTPATIENT
Start: 2023-06-03 | End: 2023-06-05 | Stop reason: HOSPADM

## 2023-06-03 RX ORDER — ASPIRIN 81 MG/1
81 TABLET ORAL DAILY
Status: DISCONTINUED | OUTPATIENT
Start: 2023-06-04 | End: 2023-06-05 | Stop reason: HOSPADM

## 2023-06-03 RX ORDER — ACETAMINOPHEN 325 MG/1
650 TABLET ORAL EVERY 6 HOURS PRN
Status: DISCONTINUED | OUTPATIENT
Start: 2023-06-03 | End: 2023-06-05 | Stop reason: HOSPADM

## 2023-06-03 RX ORDER — ATORVASTATIN CALCIUM 40 MG/1
40 TABLET, FILM COATED ORAL DAILY
Status: DISCONTINUED | OUTPATIENT
Start: 2023-06-04 | End: 2023-06-05 | Stop reason: HOSPADM

## 2023-06-03 RX ORDER — GUAIFENESIN 100 MG/5ML
200 SOLUTION ORAL EVERY 4 HOURS PRN
Status: DISCONTINUED | OUTPATIENT
Start: 2023-06-03 | End: 2023-06-05 | Stop reason: HOSPADM

## 2023-06-03 RX ORDER — IPRATROPIUM BROMIDE AND ALBUTEROL SULFATE 2.5; .5 MG/3ML; MG/3ML
3 SOLUTION RESPIRATORY (INHALATION) EVERY 4 HOURS PRN
Status: DISCONTINUED | OUTPATIENT
Start: 2023-06-03 | End: 2023-06-05 | Stop reason: HOSPADM

## 2023-06-03 RX ORDER — BUTALBITAL, ACETAMINOPHEN AND CAFFEINE 50; 325; 40 MG/1; MG/1; MG/1
1 TABLET ORAL
Status: COMPLETED | OUTPATIENT
Start: 2023-06-03 | End: 2023-06-04

## 2023-06-03 RX ORDER — ONDANSETRON 2 MG/ML
4 INJECTION INTRAMUSCULAR; INTRAVENOUS EVERY 8 HOURS PRN
Status: DISCONTINUED | OUTPATIENT
Start: 2023-06-03 | End: 2023-06-05 | Stop reason: HOSPADM

## 2023-06-03 RX ADMIN — ACETAMINOPHEN 650 MG: 325 TABLET ORAL at 08:06

## 2023-06-03 RX ADMIN — ONDANSETRON 4 MG: 2 INJECTION INTRAMUSCULAR; INTRAVENOUS at 08:06

## 2023-06-03 RX ADMIN — SODIUM CHLORIDE: 9 INJECTION, SOLUTION INTRAVENOUS at 08:06

## 2023-06-03 RX ADMIN — MECLIZINE HYDROCHLORIDE 50 MG: 25 TABLET ORAL at 04:06

## 2023-06-03 RX ADMIN — SODIUM CHLORIDE 1000 ML: 9 INJECTION, SOLUTION INTRAVENOUS at 02:06

## 2023-06-03 NOTE — ED PROVIDER NOTES
SCRIBE #1 NOTE: I, Tori Bocanegra, am scribing for, and in the presence of, Chavez Salamanca Jr., MD. I have scribed the entire note.       History     Chief Complaint   Patient presents with    Toxic Inhalation     Brought by [facility] for inhalation of carburetor fumes today at 11am. C/o dizziness, diaphoretic, and nystagmus.     Review of patient's allergies indicates:   Allergen Reactions    Lexapro [escitalopram oxalate]      Serotonin Syndrome ( Pt was seen in the emergency department)    Sulfa (sulfonamide antibiotics)      Room spinning with cold sweats    Sulfamethoxazole-trimethoprim          History of Present Illness     HPI    6/3/2023, 12:57 PM  History obtained from the patient      History of Present Illness: Sae Santiago is a 52 y.o. male patient with a PMHx of HTN, HLD, and GERD who presents to the Emergency Department for evaluation of dizziness which onset a 2 hrs PTA. Pt was using carburetor  outside, and had a sudden onset of dizziness and associated symptoms. Pt has used this  before, and did not experience any symptoms. Pt denies his car being on, or having any generators on near him. Symptoms are constant and moderate in severity. No mitigating or exacerbating factors reported. Associated sxs include disorientation, nausea, fatigue, generalized weakness, and vomiting. Pt also reports not being able to walk, so he was crawling on the floor. Patient denies any SOB, CP, HA, light-headedness, abd pain, diplopia or all other sxs at this time. Pt denies being in any pain. Pt reports he took his medications this morning, has not had sick contact, and has been eating and drinking fine. No further complaints or concerns at this time.       Arrival mode: [facility]     PCP: SKYE Reyes Jr, MD        Past Medical History:  Past Medical History:   Diagnosis Date    Anxiety     GERD (gastroesophageal reflux disease)     Hyperlipidemia     Hypertension        Past Surgical History:  Past  Surgical History:   Procedure Laterality Date    ANTERIOR CERVICAL DISCECTOMY W/ FUSION Bilateral 1/9/2020    Procedure: DISCECTOMY, SPINE, CERVICAL, ANTERIOR APPROACH, WITH FUSION  ;  Surgeon: Ari Grossman MD;  Location: [facility] OR;  Service: Neurosurgery;  Laterality: Bilateral;    COLONOSCOPY N/A 12/7/2021    Procedure: COLONOSCOPY;  Surgeon: Nirmala Mehta MD;  Location: [facility] ENDO;  Service: Endoscopy;  Laterality: N/A;    EPIDURAL STEROID INJECTION INTO CERVICAL SPINE N/A 12/13/2019    Procedure: C7/T1 IL MOE;  Surgeon: Parveen Goldstein MD;  Location: [facility] PAIN MGT;  Service: Pain Management;  Laterality: N/A;    KNEE SURGERY Left     SURGICAL REMOVAL OF PILONIDAL CYST      SURGICAL REMOVAL OF VERTEBRAL BODY OF CERVICAL SPINE  1/9/2020    Procedure: CORPECTOMY, SPINE, CERVICAL;  Surgeon: Ari Grossman MD;  Location: [facility] OR;  Service: Neurosurgery;;    TONSILLECTOMY           Family History:  Family History   Problem Relation Age of Onset    Hypertension Father     Melanoma Neg Hx     Psoriasis Neg Hx     Lupus Neg Hx     Eczema Neg Hx     Acne Neg Hx        Social History:  Social History     Tobacco Use    Smoking status: Never    Smokeless tobacco: Never   Substance and Sexual Activity    Alcohol use: No    Drug use: No    Sexual activity: Yes     Partners: Female        Review of Systems     Review of Systems   Constitutional:  Positive for fatigue. Negative for fever.   HENT:  Negative for sore throat.    Respiratory:  Negative for shortness of breath.    Cardiovascular:  Negative for chest pain.   Gastrointestinal:  Positive for nausea and vomiting. Negative for abdominal pain.   Genitourinary:  Negative for dysuria.   Musculoskeletal:  Negative for back pain.   Skin:  Negative for rash.   Neurological:  Positive for dizziness and weakness (generalized). Negative for light-headedness and headaches.        (+) disoriented   Hematological:  Does not bruise/bleed easily.   All other systems reviewed  and are negative.     Physical Exam     Initial Vitals [06/03/23 1251]   BP Pulse Resp Temp SpO2   (!) 169/88 73 16 98 °F (36.7 °C) 100 %      MAP       --          Physical Exam  Nursing Notes and Vital Signs Reviewed.  Constitutional: Patient is in no acute distress. Well-developed and well-nourished.  Head: Atraumatic. Normocephalic.  Eyes: PERRL. EOM intact. Conjunctivae are not pale. No scleral icterus. Bilateral left beating horizontal nystagmus.   ENT: Mucous membranes are moist. Oropharynx is clear and symmetric.    Neck: Supple. Full ROM. No lymphadenopathy.  Cardiovascular: Regular rate. Regular rhythm. No murmurs, rubs, or gallops. Distal pulses are 2+ and symmetric.  Pulmonary/Chest: No respiratory distress. Clear to auscultation bilaterally. No wheezing or rales.  Abdominal: Soft and non-distended.  There is no tenderness.  No rebound, guarding, or rigidity. Good bowel sounds.  Genitourinary: No CVA tenderness  Musculoskeletal: Moves all extremities. No obvious deformities. No edema. No calf tenderness.  Skin: Warm and dry.  Neurological:  Alert, awake, and appropriate.  Normal speech.  No acute focal neurological deficits are appreciated.  Psychiatric: Normal affect. Good eye contact. Appropriate in content.     ED Course   Procedures  ED Vital Signs:  Vitals:    06/04/23 1008 06/04/23 1009 06/04/23 1143 06/04/23 1325   BP: (!) 177/81 (!) 177/81 (!) 148/69    Pulse:   76 73   Resp:   16    Temp:   99.2 °F (37.3 °C)    TempSrc:   Oral    SpO2:   97%    Weight:       Height:        06/04/23 1431 06/04/23 1735 06/04/23 1949 06/05/23 0029   BP: (!) 160/75  (!) 151/72 (!) 147/69   Pulse: 63 (!) 55 (!) 53 (!) 52   Resp: 18  18 18   Temp: 98.3 °F (36.8 °C)  98.3 °F (36.8 °C) 98.3 °F (36.8 °C)   TempSrc:   Oral    SpO2: 97%  97% 99%   Weight:       Height:        06/05/23 0440 06/05/23 0707 06/05/23 0905 06/05/23 1138   BP: (!) 172/78 (!) 183/84 (!) 183/84    Pulse: (!) 56 (!) 57 65 64   Resp: 18 17     Temp:  98.2 °F (36.8 °C) 98 °F (36.7 °C)     TempSrc:       SpO2: (!) 94% 98%     Weight:       Height:        06/05/23 1144 06/05/23 1328 06/05/23 1338   BP: (!) 177/81 (!) 142/68    Pulse: 61 (!) 54 (!) 58   Resp: 20     Temp: 96.5 °F (35.8 °C)     TempSrc: Oral     SpO2: 100%     Weight:      Height:          Abnormal Lab Results:  Labs Reviewed   CBC W/ AUTO DIFFERENTIAL - Abnormal; Notable for the following components:       Result Value    Gran % 76.2 (*)     Lymph % 17.6 (*)     All other components within normal limits   COMPREHENSIVE METABOLIC PANEL - Abnormal; Notable for the following components:    Glucose 186 (*)     BUN 22 (*)     All other components within normal limits   COMPREHENSIVE METABOLIC PANEL - Abnormal; Notable for the following components:    Glucose 146 (*)     Alkaline Phosphatase 51 (*)     All other components within normal limits    Narrative:     Fasting   CBC W/ AUTO DIFFERENTIAL - Abnormal; Notable for the following components:    Hemoglobin 13.2 (*)     Hematocrit 39.9 (*)     All other components within normal limits    Narrative:     Fasting   TROPONIN I - Abnormal; Notable for the following components:    Troponin I 0.094 (*)     All other components within normal limits    Narrative:     Fasting   ISTAT PROCEDURE - Abnormal; Notable for the following components:    POC PCO2 50.2 (*)     POC PO2 32 (*)     POC HCO3 28.7 (*)     POC SATURATED O2 58 (*)     All other components within normal limits   TROPONIN I   B-TYPE NATRIURETIC PEPTIDE   LIPASE   URINALYSIS, REFLEX TO URINE CULTURE    Narrative:     Specimen Source->Urine   DRUG SCREEN PANEL, URINE EMERGENCY    Narrative:     Specimen Source->Urine   TROPONIN I   URINALYSIS, REFLEX TO URINE CULTURE    Narrative:     Specimen Source->Urine   TSH   MAGNESIUM    Narrative:     Fasting   PHOSPHORUS    Narrative:     Fasting   APTT    Narrative:     Fasting   PROTIME-INR    Narrative:     Fasting   CARBON MONOXIDE, BLOOD        All Lab  Results:  Results for orders placed or performed during the hospital encounter of 06/03/23   CBC auto differential   Result Value Ref Range    WBC 8.30 3.90 - 12.70 K/uL    RBC 5.44 4.60 - 6.20 M/uL    Hemoglobin 15.1 14.0 - 18.0 g/dL    Hematocrit 45.2 40.0 - 54.0 %    MCV 83 82 - 98 fL    MCH 27.8 27.0 - 31.0 pg    MCHC 33.4 32.0 - 36.0 g/dL    RDW 12.3 11.5 - 14.5 %    Platelets 243 150 - 450 K/uL    MPV 10.0 9.2 - 12.9 fL    Immature Granulocytes 0.2 0.0 - 0.5 %    Gran # (ANC) 6.3 1.8 - 7.7 K/uL    Immature Grans (Abs) 0.02 0.00 - 0.04 K/uL    Lymph # 1.5 1.0 - 4.8 K/uL    Mono # 0.4 0.3 - 1.0 K/uL    Eos # 0.0 0.0 - 0.5 K/uL    Baso # 0.03 0.00 - 0.20 K/uL    nRBC 0 0 /100 WBC    Gran % 76.2 (H) 38.0 - 73.0 %    Lymph % 17.6 (L) 18.0 - 48.0 %    Mono % 5.1 4.0 - 15.0 %    Eosinophil % 0.5 0.0 - 8.0 %    Basophil % 0.4 0.0 - 1.9 %    Differential Method Automated    Comprehensive metabolic panel   Result Value Ref Range    Sodium 140 136 - 145 mmol/L    Potassium 4.1 3.5 - 5.1 mmol/L    Chloride 100 95 - 110 mmol/L    CO2 27 23 - 29 mmol/L    Glucose 186 (H) 70 - 110 mg/dL    BUN 22 (H) 6 - 20 mg/dL    Creatinine 1.1 0.5 - 1.4 mg/dL    Calcium 9.5 8.7 - 10.5 mg/dL    Total Protein 7.5 6.0 - 8.4 g/dL    Albumin 4.3 3.5 - 5.2 g/dL    Total Bilirubin 0.6 0.1 - 1.0 mg/dL    Alkaline Phosphatase 56 55 - 135 U/L    AST 31 10 - 40 U/L    ALT 39 10 - 44 U/L    Anion Gap 13 8 - 16 mmol/L    eGFR >60 >60 mL/min/1.73 m^2   Troponin I #1   Result Value Ref Range    Troponin I <0.006 0.000 - 0.026 ng/mL   BNP   Result Value Ref Range    BNP 35 0 - 99 pg/mL   Lipase   Result Value Ref Range    Lipase 11 4 - 60 U/L   Urinalysis, Reflex to Urine Culture Urine, Clean Catch    Specimen: Urine   Result Value Ref Range    Specimen UA Urine, Clean Catch     Color, UA Yellow Yellow, Straw, Moira    Appearance, UA Clear Clear    pH, UA 6.0 5.0 - 8.0    Specific Gravity, UA 1.020 1.005 - 1.030    Protein, UA Negative Negative     Glucose, UA Negative Negative    Ketones, UA Negative Negative    Bilirubin (UA) Negative Negative    Occult Blood UA Negative Negative    Nitrite, UA Negative Negative    Urobilinogen, UA Negative <2.0 EU/dL    Leukocytes, UA Negative Negative   Drug screen panel, in-house   Result Value Ref Range    Benzodiazepines Negative Negative    Methadone metabolites Negative Negative    Cocaine (Metab.) Negative Negative    Opiate Scrn, Ur Negative Negative    Barbiturate Screen, Ur Negative Negative    Amphetamine Screen, Ur Negative Negative    THC Negative Negative    Phencyclidine Negative Negative    Creatinine, Urine 136.2 23.0 - 375.0 mg/dL    Toxicology Information SEE COMMENT    Troponin I #2   Result Value Ref Range    Troponin I <0.006 0.000 - 0.026 ng/mL   Urinalysis, Reflex to Urine Culture Urine, Clean Catch    Specimen: Urine   Result Value Ref Range    Specimen UA Urine, Clean Catch     Color, UA Yellow Yellow, Straw, Moira    Appearance, UA Clear Clear    pH, UA 6.0 5.0 - 8.0    Specific Gravity, UA 1.020 1.005 - 1.030    Protein, UA Negative Negative    Glucose, UA Negative Negative    Ketones, UA Negative Negative    Bilirubin (UA) Negative Negative    Occult Blood UA Negative Negative    Nitrite, UA Negative Negative    Urobilinogen, UA Negative <2.0 EU/dL    Leukocytes, UA Negative Negative   TSH   Result Value Ref Range    TSH 0.548 0.400 - 4.000 uIU/mL   Comprehensive metabolic panel   Result Value Ref Range    Sodium 140 136 - 145 mmol/L    Potassium 3.9 3.5 - 5.1 mmol/L    Chloride 104 95 - 110 mmol/L    CO2 25 23 - 29 mmol/L    Glucose 146 (H) 70 - 110 mg/dL    BUN 14 6 - 20 mg/dL    Creatinine 1.0 0.5 - 1.4 mg/dL    Calcium 8.9 8.7 - 10.5 mg/dL    Total Protein 6.2 6.0 - 8.4 g/dL    Albumin 3.6 3.5 - 5.2 g/dL    Total Bilirubin 0.5 0.1 - 1.0 mg/dL    Alkaline Phosphatase 51 (L) 55 - 135 U/L    AST 20 10 - 40 U/L    ALT 28 10 - 44 U/L    Anion Gap 11 8 - 16 mmol/L    eGFR >60 >60 mL/min/1.73 m^2    Magnesium   Result Value Ref Range    Magnesium 2.1 1.6 - 2.6 mg/dL   Phosphorus   Result Value Ref Range    Phosphorus 3.2 2.7 - 4.5 mg/dL   Lipid panel   Result Value Ref Range    Cholesterol 119 (L) 120 - 199 mg/dL    Triglycerides 157 (H) 30 - 150 mg/dL    HDL 31 (L) 40 - 75 mg/dL    LDL Cholesterol 56.6 (L) 63.0 - 159.0 mg/dL    HDL/Cholesterol Ratio 26.1 20.0 - 50.0 %    Total Cholesterol/HDL Ratio 3.8 2.0 - 5.0    Non-HDL Cholesterol 88 mg/dL   Hemoglobin A1c   Result Value Ref Range    Hemoglobin A1C 6.1 (H) 4.0 - 5.6 %    Estimated Avg Glucose 128 68 - 131 mg/dL   CBC auto differential   Result Value Ref Range    WBC 7.55 3.90 - 12.70 K/uL    RBC 4.82 4.60 - 6.20 M/uL    Hemoglobin 13.2 (L) 14.0 - 18.0 g/dL    Hematocrit 39.9 (L) 40.0 - 54.0 %    MCV 83 82 - 98 fL    MCH 27.4 27.0 - 31.0 pg    MCHC 33.1 32.0 - 36.0 g/dL    RDW 12.5 11.5 - 14.5 %    Platelets 200 150 - 450 K/uL    MPV 9.2 9.2 - 12.9 fL    Immature Granulocytes 0.1 0.0 - 0.5 %    Gran # (ANC) 5.0 1.8 - 7.7 K/uL    Immature Grans (Abs) 0.01 0.00 - 0.04 K/uL    Lymph # 1.9 1.0 - 4.8 K/uL    Mono # 0.6 0.3 - 1.0 K/uL    Eos # 0.1 0.0 - 0.5 K/uL    Baso # 0.03 0.00 - 0.20 K/uL    nRBC 0 0 /100 WBC    Gran % 66.4 38.0 - 73.0 %    Lymph % 24.6 18.0 - 48.0 %    Mono % 7.8 4.0 - 15.0 %    Eosinophil % 0.7 0.0 - 8.0 %    Basophil % 0.4 0.0 - 1.9 %    Differential Method Automated    Troponin I   Result Value Ref Range    Troponin I 0.094 (H) 0.000 - 0.026 ng/mL   APTT   Result Value Ref Range    aPTT 25.6 21.0 - 32.0 sec   Protime-INR   Result Value Ref Range    Prothrombin Time 11.0 9.0 - 12.5 sec    INR 1.0 0.8 - 1.2   Comprehensive metabolic panel   Result Value Ref Range    Sodium 139 136 - 145 mmol/L    Potassium 4.3 3.5 - 5.1 mmol/L    Chloride 102 95 - 110 mmol/L    CO2 28 23 - 29 mmol/L    Glucose 132 (H) 70 - 110 mg/dL    BUN 14 6 - 20 mg/dL    Creatinine 1.0 0.5 - 1.4 mg/dL    Calcium 9.7 8.7 - 10.5 mg/dL    Total Protein 6.8 6.0 - 8.4  g/dL    Albumin 4.1 3.5 - 5.2 g/dL    Total Bilirubin 0.6 0.1 - 1.0 mg/dL    Alkaline Phosphatase 58 55 - 135 U/L    AST 28 10 - 40 U/L    ALT 36 10 - 44 U/L    Anion Gap 9 8 - 16 mmol/L    eGFR >60 >60 mL/min/1.73 m^2   CBC auto differential   Result Value Ref Range    WBC 6.06 3.90 - 12.70 K/uL    RBC 5.35 4.60 - 6.20 M/uL    Hemoglobin 14.7 14.0 - 18.0 g/dL    Hematocrit 45.8 40.0 - 54.0 %    MCV 86 82 - 98 fL    MCH 27.5 27.0 - 31.0 pg    MCHC 32.1 32.0 - 36.0 g/dL    RDW 12.7 11.5 - 14.5 %    Platelets 185 150 - 450 K/uL    MPV 10.2 9.2 - 12.9 fL    Immature Granulocytes 0.2 0.0 - 0.5 %    Gran # (ANC) 4.0 1.8 - 7.7 K/uL    Immature Grans (Abs) 0.01 0.00 - 0.04 K/uL    Lymph # 1.6 1.0 - 4.8 K/uL    Mono # 0.4 0.3 - 1.0 K/uL    Eos # 0.1 0.0 - 0.5 K/uL    Baso # 0.04 0.00 - 0.20 K/uL    nRBC 0 0 /100 WBC    Gran % 65.7 38.0 - 73.0 %    Lymph % 26.4 18.0 - 48.0 %    Mono % 5.8 4.0 - 15.0 %    Eosinophil % 1.2 0.0 - 8.0 %    Basophil % 0.7 0.0 - 1.9 %    Differential Method Automated    Troponin I   Result Value Ref Range    Troponin I <0.006 0.000 - 0.026 ng/mL   POCT CARBOXYHEMOGLOBIN Once   Result Value Ref Range    Carboxyhemoglobin 1.1 (A) 1.5 - 5 %   Echo   Result Value Ref Range    BSA 2.43 m2    TDI SEPTAL 0.14 m/s    LV LATERAL E/E' RATIO 5.92 m/s    LV SEPTAL E/E' RATIO 5.50 m/s    Left Ventricular Outflow Tract Mean Velocity 0.62 cm/s    Left Ventricular Outflow Tract Mean Gradient 1.73 mmHg    Pulmonary Valve Mean Velocity 1.03 m/s    TDI LATERAL 0.13 m/s    PV PEAK VELOCITY 1.37 cm/s    LVIDd 4.98 3.5 - 6.0 cm    IVS 1.08 0.6 - 1.1 cm    Posterior Wall 1.10 0.6 - 1.1 cm    LVIDs 3.57 2.1 - 4.0 cm    FS 28 28 - 44 %    STJ 2.60 cm    LV mass 203.24 g    LA size 3.33 cm    Left Ventricle Relative Wall Thickness 0.44 cm    AV mean gradient 4 mmHg    AV valve area 3.06 cm2    AV Velocity Ratio 0.71     AV index (prosthetic) 0.91     E/A ratio 1.38     Mean e' 0.14 m/s    E wave deceleration time 251.38  msec    IVRT 76.12 msec    LVOT diameter 2.07 cm    LVOT area 3.4 cm2    LVOT peak irwin 0.87 m/s    LVOT peak VTI 23.40 cm    Ao peak irwin 1.23 m/s    Ao VTI 25.7 cm    LVOT stroke volume 78.71 cm3    AV peak gradient 6 mmHg    E/E' ratio 5.70 m/s    MV Peak E Irwin 0.77 m/s    TR Max Irwin 2.10 m/s    MV Peak A Irwin 0.56 m/s    LV Systolic Volume 53.22 mL    LV Systolic Volume Index 22.4 mL/m2    LV Diastolic Volume 117.27 mL    LV Diastolic Volume Index 49.27 mL/m2    LV Mass Index 85 g/m2    RA Major Axis 6.06 cm    Left Atrium Minor Axis 4.89 cm    Left Atrium Major Axis 5.78 cm    Triscuspid Valve Regurgitation Peak Gradient 18 mmHg    Right Atrial Pressure (from IVC) 3 mmHg    EF 65 %    TV rest pulmonary artery pressure 21 mmHg   ISTAT PROCEDURE   Result Value Ref Range    POC PH 7.366 7.35 - 7.45    POC PCO2 50.2 (H) 35 - 45 mmHg    POC PO2 32 (L) 40 - 60 mmHg    POC HCO3 28.7 (H) 24 - 28 mmol/L    POC BE 3 -2 to 2 mmol/L    POC SATURATED O2 58 (L) 95 - 100 %    Sample VENOUS     Site Other     Allens Test N/A     DelSys Room Air     Mode SPONT          Imaging Results:  Imaging Results              MRA Brain (Final result)  Result time 06/04/23 10:55:46      Final result by Luis Rubio MD (06/04/23 10:55:46)                   Impression:      Unremarkable MRA brain.      Electronically signed by: Luis Rubio MD  Date:    06/04/2023  Time:    10:55               Narrative:    EXAMINATION:  MRA BRAIN WITHOUT CONTRAST    CLINICAL HISTORY:  Vertigo, central;    TECHNIQUE:  Non-contrast 3-D time-of-flight intracranial MR angiography was performed through the [place] [facility] [place] with MIP reformatting.    COMPARISON:  None    FINDINGS:  There is flow related enhancement throughout the anterior and posterior circulation.  No large vessel occlusion.  No focal stenosis or irregularity.  No aneurysm.  No AVM.                                       MRI BRAIN WITHOUT CONTRAST (Final result)  Result time 06/04/23 10:52:12       Final result by Luis Rubio MD (06/04/23 10:52:12)                   Impression:      Normal MR brain.      Electronically signed by: Luis Rubio MD  Date:    06/04/2023  Time:    10:52               Narrative:    EXAMINATION:  MRI BRAIN WITHOUT CONTRAST    CLINICAL HISTORY:  Dizziness, non-specific;Dizziness, persistent/recurrent, cardiac or vascular cause suspected;    TECHNIQUE:  MR brain without contrast performed multiplanar, multisequence imaging.    COMPARISON:  Head CT 06/03/2023.    FINDINGS:  There is no abnormal signal intensity.  There is no restricted diffusion.  No acute infarct.  No bleed.  Normal ventricles.  No intracranial mass.  7th/8th  cranial nerves are unremarkable and symmetric.  Midline structures are well-formed.  T2 flow voids are preserved the major intracranial arterial and dural venous structures.  The orbits are unremarkable.  The paranasal sinuses and mastoid air cells are well aerated.                                       CT Head Without Contrast (Final result)  Result time 06/03/23 14:40:33      Final result by Luis Rubio MD (06/03/23 14:40:33)                   Impression:      Normal head CT.    All CT scans at this facility are performed  using dose modulation techniques as appropriate to performed exam including the following:  automated exposure control; adjustment of mA and/or kV according to the patients size (this includes techniques or standardized protocols for targeted exams where dose is matched to indication/reason for exam: i.e. extremities or head);  iterative reconstruction technique.      Electronically signed by: Luis Rubio MD  Date:    06/03/2023  Time:    14:40               Narrative:    EXAMINATION:  CT HEAD WITHOUT CONTRAST    CLINICAL HISTORY:  Dizziness, persistent/recurrent, cardiac or vascular cause suspected; Dizziness and giddiness    TECHNIQUE:  Routine noncontrast head CT.    COMPARISON:  None    FINDINGS:  There is no acute intracranial  hemorrhage or abnormal extra-axial fluid collection.  There is no abnormal increased or decreased density within the brain parenchyma.  Gray-white differentiation preserved normal ventricles.  There is no intracranial mass or mass effect.  The calvarium is intact visualized paranasal sinuses mastoid air cells are well aerated.                                       X-Ray Chest AP Portable (Final result)  Result time 06/03/23 14:37:08      Final result by Luis Rubio MD (06/03/23 14:37:08)                   Impression:      Normal chest x-ray.      Electronically signed by: Luis Rubio MD  Date:    06/03/2023  Time:    14:37               Narrative:    EXAMINATION:  XR CHEST AP PORTABLE    CLINICAL HISTORY:  dizziness;    FINDINGS:  Comparison study 12/24/2019.  No change.  Normal size heart.  The lungs are clear.                                       The EKG was ordered, reviewed, and independently interpreted by the ED provider.  Interpretation time: 14:35  Rate: 68 BPM  Rhythm: normal sinus rhythm  Interpretation: Normal ECG. No STEMI.           The Emergency Provider reviewed the vital signs and test results, which are outlined above.     ED Discussion     6:30 PM: Discussed pt's case with Dr. Allen Arguelles MD (Otolaryngology) who recommends working up pt as a possible CVA. Dr. Abdi states there is nothing additionally he can do as ENT.    6:41 PM: Discussed case with Yu Reyna NP ([facility]). Dr. Milton agrees with current care and management of pt and accepts admission.   Admitting Service: [facility]  Admitting Physician: Dr. Milton  Admit to: Observation Med/Tele    6:41 PM: Re-evaluated pt. I have discussed test results, shared treatment plan, and the need for admission with patient and family at bedside. Pt and family express understanding at this time and agree with all information. All questions answered. Pt and family have no further questions or concerns at this time. Pt  is ready for admit.       Medical Decision Making:   Clinical Tests:   Lab Tests: Ordered and Reviewed  Radiological Study: Ordered and Reviewed  Medical Tests: Ordered and Reviewed         ED Medication(s):  Medications   0.9%  NaCl infusion ( Intravenous Verify Only 6/4/23 1924)   sodium chloride 0.9% bolus 1,000 mL 1,000 mL (0 mLs Intravenous Stopped 6/3/23 1529)   meclizine tablet 50 mg (50 mg Oral Given 6/3/23 1648)   butalbital-acetaminophen-caffeine -40 mg per tablet 1 tablet (1 tablet Oral Given 6/4/23 0000)       Discharge Medication List as of 6/5/2023  2:38 PM        START taking these medications    Details   meclizine (ANTIVERT) 25 mg tablet Take 1 tablet (25 mg total) by mouth 3 (three) times daily as needed for Dizziness., Starting Mon 6/5/2023, Until Mon 6/12/2023 at 2359, Normal              Follow-up Information       E Jero Reyes Jr, MD Follow up in 3 day(s).    Specialties: Internal Medicine, Pediatrics  Why: -hospital follow up  Contact information:  [address]  [place] [address]  [phone]               Cayla Kamara MD Follow up in 1 week(s).    Specialty: Otolaryngology  Why: -hospital follow up  Contact information:  [address]  [place] [address] [phone]                                 Scribe Attestation:   Scribe #1: I performed the above scribed service and the documentation accurately describes the services I performed. I attest to the accuracy of the note.     Attending:   Physician Attestation Statement for Scribe #1: I, Chavez Salamanca Jr., MD, personally performed the services described in this documentation, as scribed by Tori Bocanegra, in my presence, and it is both accurate and complete.           Clinical Impression       ICD-10-CM ICD-9-CM   1. Vertigo  R42 780.4   2. Chest pain  R07.9 786.50   3. Dizziness  R42 780.4   4. TIA (transient ischemic attack)  G45.9 435.9       Disposition:   Disposition: Placed in Observation  Condition:  Good Salamanca Jr., MD  06/05/23 2012

## 2023-06-03 NOTE — ED NOTES
Spoke to Edi with poison control who reports the carburetor  pt inhaled contains methanol. Pt is at risk for seizures and dysrhythmias. Recommends EKG, seizure precautions, VBG, and treatment of symptoms. Dr. Salamanca notified.

## 2023-06-04 LAB
ALBUMIN SERPL BCP-MCNC: 3.6 G/DL (ref 3.5–5.2)
ALP SERPL-CCNC: 51 U/L (ref 55–135)
ALT SERPL W/O P-5'-P-CCNC: 28 U/L (ref 10–44)
ANION GAP SERPL CALC-SCNC: 11 MMOL/L (ref 8–16)
APTT PPP: 25.6 SEC (ref 21–32)
AST SERPL-CCNC: 20 U/L (ref 10–40)
AV INDEX (PROSTH): 0.91
AV MEAN GRADIENT: 4 MMHG
AV PEAK GRADIENT: 6 MMHG
AV VALVE AREA: 3.06 CM2
AV VELOCITY RATIO: 0.71
BASOPHILS # BLD AUTO: 0.03 K/UL (ref 0–0.2)
BASOPHILS NFR BLD: 0.4 % (ref 0–1.9)
BILIRUB SERPL-MCNC: 0.5 MG/DL (ref 0.1–1)
BSA FOR ECHO PROCEDURE: 2.43 M2
BUN SERPL-MCNC: 14 MG/DL (ref 6–20)
CALCIUM SERPL-MCNC: 8.9 MG/DL (ref 8.7–10.5)
CHLORIDE SERPL-SCNC: 104 MMOL/L (ref 95–110)
CHOLEST SERPL-MCNC: 119 MG/DL (ref 120–199)
CHOLEST/HDLC SERPL: 3.8 {RATIO} (ref 2–5)
CO2 SERPL-SCNC: 25 MMOL/L (ref 23–29)
CREAT SERPL-MCNC: 1 MG/DL (ref 0.5–1.4)
CV ECHO LV RWT: 0.44 CM
DIFFERENTIAL METHOD: ABNORMAL
DOP CALC AO PEAK VEL: 1.23 M/S
DOP CALC AO VTI: 25.7 CM
DOP CALC LVOT AREA: 3.4 CM2
DOP CALC LVOT DIAMETER: 2.07 CM
DOP CALC LVOT PEAK VEL: 0.87 M/S
DOP CALC LVOT STROKE VOLUME: 78.71 CM3
DOP CALCLVOT PEAK VEL VTI: 23.4 CM
E WAVE DECELERATION TIME: 251.38 MSEC
E/A RATIO: 1.38
E/E' RATIO: 5.7 M/S
ECHO LV POSTERIOR WALL: 1.1 CM (ref 0.6–1.1)
EJECTION FRACTION: 65 %
EOSINOPHIL # BLD AUTO: 0.1 K/UL (ref 0–0.5)
EOSINOPHIL NFR BLD: 0.7 % (ref 0–8)
ERYTHROCYTE [DISTWIDTH] IN BLOOD BY AUTOMATED COUNT: 12.5 % (ref 11.5–14.5)
EST. GFR  (NO RACE VARIABLE): >60 ML/MIN/1.73 M^2
ESTIMATED AVG GLUCOSE: 128 MG/DL (ref 68–131)
FRACTIONAL SHORTENING: 28 % (ref 28–44)
GLUCOSE SERPL-MCNC: 146 MG/DL (ref 70–110)
HBA1C MFR BLD: 6.1 % (ref 4–5.6)
HCT VFR BLD AUTO: 39.9 % (ref 40–54)
HDLC SERPL-MCNC: 31 MG/DL (ref 40–75)
HDLC SERPL: 26.1 % (ref 20–50)
HGB BLD-MCNC: 13.2 G/DL (ref 14–18)
IMM GRANULOCYTES # BLD AUTO: 0.01 K/UL (ref 0–0.04)
IMM GRANULOCYTES NFR BLD AUTO: 0.1 % (ref 0–0.5)
INR PPP: 1 (ref 0.8–1.2)
INTERVENTRICULAR SEPTUM: 1.08 CM (ref 0.6–1.1)
IVRT: 76.12 MSEC
LA MAJOR: 5.78 CM
LA MINOR: 4.89 CM
LDLC SERPL CALC-MCNC: 56.6 MG/DL (ref 63–159)
LEFT ATRIUM SIZE: 3.33 CM
LEFT INTERNAL DIMENSION IN SYSTOLE: 3.57 CM (ref 2.1–4)
LEFT VENTRICLE DIASTOLIC VOLUME INDEX: 49.27 ML/M2
LEFT VENTRICLE DIASTOLIC VOLUME: 117.27 ML
LEFT VENTRICLE MASS INDEX: 85 G/M2
LEFT VENTRICLE SYSTOLIC VOLUME INDEX: 22.4 ML/M2
LEFT VENTRICLE SYSTOLIC VOLUME: 53.22 ML
LEFT VENTRICULAR INTERNAL DIMENSION IN DIASTOLE: 4.98 CM (ref 3.5–6)
LEFT VENTRICULAR MASS: 203.24 G
LV LATERAL E/E' RATIO: 5.92 M/S
LV SEPTAL E/E' RATIO: 5.5 M/S
LVOT MG: 1.73 MMHG
LVOT MV: 0.62 CM/S
LYMPHOCYTES # BLD AUTO: 1.9 K/UL (ref 1–4.8)
LYMPHOCYTES NFR BLD: 24.6 % (ref 18–48)
MAGNESIUM SERPL-MCNC: 2.1 MG/DL (ref 1.6–2.6)
MCH RBC QN AUTO: 27.4 PG (ref 27–31)
MCHC RBC AUTO-ENTMCNC: 33.1 G/DL (ref 32–36)
MCV RBC AUTO: 83 FL (ref 82–98)
MONOCYTES # BLD AUTO: 0.6 K/UL (ref 0.3–1)
MONOCYTES NFR BLD: 7.8 % (ref 4–15)
MV PEAK A VEL: 0.56 M/S
MV PEAK E VEL: 0.77 M/S
NEUTROPHILS # BLD AUTO: 5 K/UL (ref 1.8–7.7)
NEUTROPHILS NFR BLD: 66.4 % (ref 38–73)
NONHDLC SERPL-MCNC: 88 MG/DL
NRBC BLD-RTO: 0 /100 WBC
PHOSPHATE SERPL-MCNC: 3.2 MG/DL (ref 2.7–4.5)
PISA TR MAX VEL: 2.1 M/S
PLATELET # BLD AUTO: 200 K/UL (ref 150–450)
PMV BLD AUTO: 9.2 FL (ref 9.2–12.9)
POTASSIUM SERPL-SCNC: 3.9 MMOL/L (ref 3.5–5.1)
PROT SERPL-MCNC: 6.2 G/DL (ref 6–8.4)
PROTHROMBIN TIME: 11 SEC (ref 9–12.5)
PV MV: 1.03 M/S
PV PEAK VELOCITY: 1.37 CM/S
RA MAJOR: 6.06 CM
RA PRESSURE: 3 MMHG
RBC # BLD AUTO: 4.82 M/UL (ref 4.6–6.2)
SODIUM SERPL-SCNC: 140 MMOL/L (ref 136–145)
STJ: 2.6 CM
TDI LATERAL: 0.13 M/S
TDI SEPTAL: 0.14 M/S
TDI: 0.14 M/S
TR MAX PG: 18 MMHG
TRIGL SERPL-MCNC: 157 MG/DL (ref 30–150)
TROPONIN I SERPL DL<=0.01 NG/ML-MCNC: 0.09 NG/ML (ref 0–0.03)
TV REST PULMONARY ARTERY PRESSURE: 21 MMHG
WBC # BLD AUTO: 7.55 K/UL (ref 3.9–12.7)

## 2023-06-04 PROCEDURE — G0378 HOSPITAL OBSERVATION PER HR: HCPCS

## 2023-06-04 PROCEDURE — 84100 ASSAY OF PHOSPHORUS: CPT | Performed by: INTERNAL MEDICINE

## 2023-06-04 PROCEDURE — 83735 ASSAY OF MAGNESIUM: CPT | Performed by: INTERNAL MEDICINE

## 2023-06-04 PROCEDURE — 96361 HYDRATE IV INFUSION ADD-ON: CPT

## 2023-06-04 PROCEDURE — 83036 HEMOGLOBIN GLYCOSYLATED A1C: CPT | Performed by: INTERNAL MEDICINE

## 2023-06-04 PROCEDURE — 92523 SPEECH SOUND LANG COMPREHEN: CPT

## 2023-06-04 PROCEDURE — 97165 OT EVAL LOW COMPLEX 30 MIN: CPT

## 2023-06-04 PROCEDURE — 92610 EVALUATE SWALLOWING FUNCTION: CPT

## 2023-06-04 PROCEDURE — 97161 PT EVAL LOW COMPLEX 20 MIN: CPT

## 2023-06-04 PROCEDURE — 85610 PROTHROMBIN TIME: CPT | Performed by: INTERNAL MEDICINE

## 2023-06-04 PROCEDURE — 25000003 PHARM REV CODE 250: Performed by: FAMILY MEDICINE

## 2023-06-04 PROCEDURE — 25000003 PHARM REV CODE 250: Performed by: INTERNAL MEDICINE

## 2023-06-04 PROCEDURE — 84484 ASSAY OF TROPONIN QUANT: CPT | Performed by: INTERNAL MEDICINE

## 2023-06-04 PROCEDURE — 80053 COMPREHEN METABOLIC PANEL: CPT | Performed by: INTERNAL MEDICINE

## 2023-06-04 PROCEDURE — 85730 THROMBOPLASTIN TIME PARTIAL: CPT | Performed by: INTERNAL MEDICINE

## 2023-06-04 PROCEDURE — 80061 LIPID PANEL: CPT | Performed by: INTERNAL MEDICINE

## 2023-06-04 PROCEDURE — 85025 COMPLETE CBC W/AUTO DIFF WBC: CPT | Performed by: INTERNAL MEDICINE

## 2023-06-04 RX ORDER — METOPROLOL SUCCINATE 25 MG/1
25 TABLET, EXTENDED RELEASE ORAL NIGHTLY
Status: DISCONTINUED | OUTPATIENT
Start: 2023-06-04 | End: 2023-06-05 | Stop reason: HOSPADM

## 2023-06-04 RX ORDER — BUSPIRONE HYDROCHLORIDE 5 MG/1
5 TABLET ORAL 3 TIMES DAILY
Status: DISCONTINUED | OUTPATIENT
Start: 2023-06-04 | End: 2023-06-05 | Stop reason: HOSPADM

## 2023-06-04 RX ORDER — LORAZEPAM 0.5 MG/1
0.5 TABLET ORAL
Status: DISCONTINUED | OUTPATIENT
Start: 2023-06-04 | End: 2023-06-04

## 2023-06-04 RX ORDER — LORAZEPAM 0.5 MG/1
0.5 TABLET ORAL EVERY 6 HOURS PRN
Status: DISCONTINUED | OUTPATIENT
Start: 2023-06-04 | End: 2023-06-05 | Stop reason: HOSPADM

## 2023-06-04 RX ORDER — HYDROCHLOROTHIAZIDE 25 MG/1
25 TABLET ORAL DAILY
Status: DISCONTINUED | OUTPATIENT
Start: 2023-06-04 | End: 2023-06-05 | Stop reason: HOSPADM

## 2023-06-04 RX ORDER — LOSARTAN POTASSIUM 50 MG/1
100 TABLET ORAL DAILY
Status: DISCONTINUED | OUTPATIENT
Start: 2023-06-04 | End: 2023-06-05 | Stop reason: HOSPADM

## 2023-06-04 RX ADMIN — ASPIRIN 81 MG: 81 TABLET, COATED ORAL at 10:06

## 2023-06-04 RX ADMIN — BUSPIRONE HYDROCHLORIDE 5 MG: 5 TABLET ORAL at 03:06

## 2023-06-04 RX ADMIN — MECLIZINE HYDROCHLORIDE 25 MG: 25 TABLET ORAL at 12:06

## 2023-06-04 RX ADMIN — BUSPIRONE HYDROCHLORIDE 5 MG: 5 TABLET ORAL at 08:06

## 2023-06-04 RX ADMIN — HYDROCHLOROTHIAZIDE 25 MG: 25 TABLET ORAL at 10:06

## 2023-06-04 RX ADMIN — ATORVASTATIN CALCIUM 40 MG: 40 TABLET, FILM COATED ORAL at 10:06

## 2023-06-04 RX ADMIN — LORAZEPAM 0.5 MG: 0.5 TABLET ORAL at 08:06

## 2023-06-04 RX ADMIN — BUTALBITAL, ACETAMINOPHEN, AND CAFFEINE 1 TABLET: 325; 50; 40 TABLET ORAL at 12:06

## 2023-06-04 RX ADMIN — SODIUM CHLORIDE: 9 INJECTION, SOLUTION INTRAVENOUS at 03:06

## 2023-06-04 RX ADMIN — METOPROLOL SUCCINATE 25 MG: 25 TABLET, FILM COATED, EXTENDED RELEASE ORAL at 08:06

## 2023-06-04 RX ADMIN — SODIUM CHLORIDE: 9 INJECTION, SOLUTION INTRAVENOUS at 04:06

## 2023-06-04 RX ADMIN — ACETAMINOPHEN 650 MG: 325 TABLET ORAL at 11:06

## 2023-06-04 RX ADMIN — MECLIZINE HYDROCHLORIDE 25 MG: 25 TABLET ORAL at 10:06

## 2023-06-04 RX ADMIN — MECLIZINE HYDROCHLORIDE 25 MG: 25 TABLET ORAL at 08:06

## 2023-06-04 RX ADMIN — LOSARTAN POTASSIUM 100 MG: 50 TABLET, FILM COATED ORAL at 10:06

## 2023-06-04 RX ADMIN — BUSPIRONE HYDROCHLORIDE 5 MG: 5 TABLET ORAL at 10:06

## 2023-06-04 NOTE — SUBJECTIVE & OBJECTIVE
Interval History: Patient continues to have vertigo with movement and nystagmus to the left.  Echo: ejection fraction is 65%., MRI: Normal MR brain. MRA: Unremarkable MRA brain. PT/OT/ST pending.  TIA work up per tele neurology.  Patient denies HA, N/V/D.  BP noted to be elevated, but AM meds had not yet been given.  Also patient expressed anxiety, resumed home ativan.    Review of Systems  Objective:     Vital Signs (Most Recent):  Temp: 99 °F (37.2 °C) (06/04/23 0846)  Pulse: 82 (06/04/23 0846)  Resp: 18 (06/04/23 0846)  BP: (!) 177/81 (06/04/23 1009)  SpO2: 95 % (06/04/23 0846) Vital Signs (24h Range):  Temp:  [98 °F (36.7 °C)-99 °F (37.2 °C)] 99 °F (37.2 °C)  Pulse:  [55-82] 82  Resp:  [16-19] 18  SpO2:  [92 %-100 %] 95 %  BP: (137-177)/(65-88) 177/81     Weight: 114.9 kg (253 lb 3.2 oz)  Body mass index is 33.41 kg/m².    Intake/Output Summary (Last 24 hours) at 6/4/2023 1056  Last data filed at 6/4/2023 1016  Gross per 24 hour   Intake 120 ml   Output 800 ml   Net -680 ml         Physical Exam  Vitals and nursing note reviewed.   Constitutional:       Appearance: Normal appearance.   HENT:      Head: Normocephalic and atraumatic.      Nose: Nose normal.      Mouth/Throat:      Mouth: Mucous membranes are moist.   Eyes:      Extraocular Movements: Extraocular movements intact.      Conjunctiva/sclera: Conjunctivae normal.      Pupils: Pupils are equal, round, and reactive to light.      Comments: Nystagmus noted when looking left.   Cardiovascular:      Rate and Rhythm: Normal rate and regular rhythm.      Pulses: Normal pulses.      Heart sounds: Normal heart sounds.   Pulmonary:      Effort: Pulmonary effort is normal.      Breath sounds: Normal breath sounds.   Abdominal:      General: Abdomen is flat. Bowel sounds are normal.      Palpations: Abdomen is soft.   Musculoskeletal:         General: Normal range of motion.      Cervical back: Normal range of motion and neck supple.   Skin:     General: Skin is  warm.      Capillary Refill: Capillary refill takes less than 2 seconds.   Neurological:      Mental Status: He is alert and oriented to person, place, and time. Mental status is at baseline.   Psychiatric:         Mood and Affect: Mood normal.         Behavior: Behavior normal.           Significant Labs: All pertinent labs within the past 24 hours have been reviewed.  Recent Lab Results  (Last 5 results in the past 24 hours)        06/04/23  0832   06/04/23  0523   06/03/23  1949   06/03/23  1931   06/03/23  1810        Albumin   3.6             Alkaline Phosphatase   51             ALT   28             Anion Gap   11             Ao peak curtis 1.23               Ao VTI 25.7               Appearance, UA     Clear           aPTT   25.6  Comment: Refer to local heparin nomogram for intensity/dose specific   therapeutic   range.               AST   20             AV valve area 3.06               AV mean gradient 4               AV index (prosthetic) 0.91               AV peak gradient 6               AV Velocity Ratio 0.71               Baso #   0.03             Basophil %   0.4             Bilirubin (UA)     Negative           BILIRUBIN TOTAL   0.5  Comment: For infants and newborns, interpretation of results should be based  on gestational age, weight and in agreement with clinical  observations.    Premature Infant recommended reference ranges:  Up to 24 hours.............<8.0 mg/dL  Up to 48 hours............<12.0 mg/dL  3-5 days..................<15.0 mg/dL  6-29 days.................<15.0 mg/dL               BSA 2.43               BUN   14             Calcium   8.9             Carboxyhemoglobin         1.1       Chloride   104             Cholesterol   119  Comment: The National Cholesterol Education Program (NCEP) has set the  following guidelines (reference ranges) for Cholesterol:  Optimal.....................<200 mg/dL  Borderline High.............200-239 mg/dL  High........................> or = 240 mg/dL                CO2   25             Color, UA     Yellow           Creatinine   1.0             Left Ventricle Relative Wall Thickness 0.44               Differential Method   Automated             E/A ratio 1.38               E/E' ratio 5.70               eGFR   >60             EF 65               Eos #   0.1             Eosinophil %   0.7             Estimated Avg Glucose   128             E wave deceleration time 251.38               FS 28               Glucose   146             Glucose, UA     Negative           Gran # (ANC)   5.0             Gran %   66.4             HDL   31  Comment: The National Cholesterol Education Program (NCEP) has set the  following guidelines (reference values) for HDL Cholesterol:  Low...............<40 mg/dL  Optimal...........>60 mg/dL               HDL/Cholesterol Ratio   26.1             Hematocrit   39.9             Hemoglobin   13.2             Hemoglobin A1C External   6.1  Comment: ADA Screening Guidelines:  5.7-6.4%  Consistent with prediabetes  >or=6.5%  Consistent with diabetes    High levels of fetal hemoglobin interfere with the HbA1C  assay. Heterozygous hemoglobin variants (HbS, HgC, etc)do  not significantly interfere with this assay.   However, presence of multiple variants may affect accuracy.               Immature Grans (Abs)   0.01  Comment: Mild elevation in immature granulocytes is non specific and   can be seen in a variety of conditions including stress response,   acute inflammation, trauma and pregnancy. Correlation with other   laboratory and clinical findings is essential.               Immature Granulocytes   0.1             INR   1.0  Comment: Coumadin Therapy:  2.0 - 3.0 for INR for all indicators except mechanical heart valves  and antiphospholipid syndromes which should use 2.5 - 3.5.               IVRT 76.12               IVSd 1.08               Ketones, UA     Negative           Left Atrium Major Axis 5.78               Left Atrium Minor Axis 4.89                LA size 3.33               LVOT area 3.4               LDL Cholesterol External   56.6  Comment: The National Cholesterol Education Program (NCEP) has set the  following guidelines (reference values) for LDL Cholesterol:  Optimal.......................<130 mg/dL  Borderline High...............130-159 mg/dL  High..........................160-189 mg/dL  Very High.....................>190 mg/dL               Leukocytes, UA     Negative           LV LATERAL E/E' RATIO 5.92               LV SEPTAL E/E' RATIO 5.50               LV EDV .27               LV Diastolic Volume Index 49.27               LVIDd 4.98               LVIDs 3.57               LV mass 203.24               LV Mass Index 85               Left Ventricular Outflow Tract Mean Gradient 1.73               Left Ventricular Outflow Tract Mean Velocity 0.62               LVOT diameter 2.07               LVOT peak irwin 0.87               LVOT stroke volume 78.71               LVOT peak VTI 23.40               LV ESV BP 53.22               LV Systolic Volume Index 22.4               Lymph #   1.9             Lymph %   24.6             Magnesium   2.1             MCH   27.4             MCHC   33.1             MCV   83             Mean e' 0.14               Mono #   0.6             Mono %   7.8             MPV   9.2             MV Peak A Irwin 0.56               MV Peak E Irwin 0.77               NITRITE UA     Negative           Non-HDL Cholesterol   88  Comment: Risk category and Non-HDL cholesterol goals:  Coronary heart disease (CHD)or equivalent (10-year risk of CHD >20%):  Non-HDL cholesterol goal     <130 mg/dL  Two or more CHD risk factors and 10-year risk of CHD <= 20%:  Non-HDL cholesterol goal     <160 mg/dL  0 to 1 CHD risk factor:  Non-HDL cholesterol goal     <190 mg/dL               nRBC   0             Occult Blood UA     Negative           pH, UA     6.0           Phosphorus   3.2             Platelets   200             Potassium   3.9              PROTEIN TOTAL   6.2             Protein, UA     Negative  Comment: Recommend a 24 hour urine protein or a urine   protein/creatinine ratio if globulin induced proteinuria is  clinically suspected.             Protime   11.0             Pulmonary Valve Mean Velocity 1.03               PV PEAK VELOCITY 1.37               Posterior Wall 1.10               Right Atrial Pressure (from IVC) 3               RA Major Axis 6.06               RBC   4.82             RDW   12.5             Sodium   140             Specific Gravity, UA     1.020           Specimen UA     Urine, Clean Catch           STJ 2.60               TDI SEPTAL 0.14               TDI LATERAL 0.13               Total Cholesterol/HDL Ratio   3.8             Triglycerides   157  Comment: The National Cholesterol Education Program (NCEP) has set the  following guidelines (reference values) for triglycerides:  Normal......................<150 mg/dL  Borderline High.............150-199 mg/dL  High........................200-499 mg/dL               Triscuspid Valve Regurgitation Peak Gradient 18               TR Max Irwin 2.10               Troponin I   0.094  Comment: The reference interval for Troponin I represents the 99th percentile   cutoff   for our facility and is consistent with 3rd generation assay   performance.               TSH       0.548         TV rest pulmonary artery pressure 21               UROBILINOGEN UA     Negative           WBC   7.55                                    Significant Imaging:   MRA Brain   Final Result      Unremarkable MRA brain.         Electronically signed by: Luis Rubio MD   Date:    06/04/2023   Time:    10:55      MRI BRAIN WITHOUT CONTRAST   Final Result      Normal MR brain.         Electronically signed by: Luis Rubio MD   Date:    06/04/2023   Time:    10:52      CT Head Without Contrast   Final Result      Normal head CT.      All CT scans at this facility are performed  using dose modulation techniques as  appropriate to performed exam including the following:  automated exposure control; adjustment of mA and/or kV according to the patients size (this includes techniques or standardized protocols for targeted exams where dose is matched to indication/reason for exam: i.e. extremities or head);  iterative reconstruction technique.         Electronically signed by: Luis Rubio MD   Date:    06/03/2023   Time:    14:40      X-Ray Chest AP Portable   Final Result      Normal chest x-ray.         Electronically signed by: Luis Rubio MD   Date:    06/03/2023   Time:    14:37

## 2023-06-04 NOTE — PT/OT/SLP EVAL
Occupational Therapy Evaluation and Treatment    Name: Sae Santiago  MRN: [id number]  Admitting Diagnosis: Vertigo  Recent Surgery: * No surgery found *      Recommendations:     Discharge Recommendations: home (WITH (S) IF VERTIGO PERSISTS)  Level of Assistance Recommended: Intermittent supervision  Discharge Equipment Recommendations: walker, rolling  Barriers to discharge: None    Assessment:     Sae Santiago is a 52 y.o. male with a medical diagnosis of Vertigo. He presents with performance deficits affecting function including  . SELF CARE DEBILITY DUE TO VERTIGO.    Rehab Prognosis: Good; patient would benefit from acute OT services to address these deficits and reach maximum level of function.    Plan:     Patient to be seen   to address the above listed problems via    Plan of Care Expires:  6/4/23  Plan of Care Reviewed with: patient, family    Subjective     Chief Complaint: INCREASED MOVEMENT INCREASING VERTIGO AND NAUSEA.  Patient Comments/Goals: FOR VERTIGO TO CEASE.  Pain/Comfort:  Pain Rating 1: 0/10    Patients cultural, spiritual, Tenriism conflicts given the current situation:      Social History:  Living Environment: Patient lives with their spouse in a single story home with number of outside stair(s): 0  Prior Level of Function: Prior to admission, patient was independent  Roles and Routines: Patient was driving prior to admission.  Equipment Used at Home: none  DME owned (not currently used): none  Assistance Upon Discharge: family    Objective:     Communicated with NURSE prior to session. Patient found supine with peripheral IV upon OT entry to room.    General Precautions: Standard, fall   Orthopedic Precautions: N/A   Braces:      Respiratory Status: Room air    Occupational Performance    Gait belt applied - Yes    Bed Mobility:   PATIENT MOD (I) WITH ALL LEVELS OF BED MOBILITY.    Functional Mobility/Transfers:  Sit <> Stand Transfer with modified independence with rolling  walker  Bed <> Chair Transfer using Stand Pivot technique with modified independence with rolling walker  Functional Mobility: PATIENT MOD (I) WITH RW WITH C/O MOVEMENT INCREASING VERTIGO.    Activities of Daily Living:  PATIENT ABLE TO PERFORM ADL'S BUT REQUIRES (A) IF MOVEMENT INCREASES VERTIGO.    Cognitive/Visual Perceptual:  NO DEFICITS NOTED.    Physical Exam:  Balance:    Postural examination/scapula alignment:    -       No postural abnormalities identified  -       BALANCE AND POSTURE ARE INFLUENCED BY VERTIGO WITH INCREASED MOVEMENTS.  Upper Extremity Range of Motion:     -       Right Upper Extremity: WFL  -       Left Upper Extremity: WFL    AMPAC 6 Click ADL:  AMPAC Total Score: 18    Treatment & Education:  Therapist provided facilitation and instruction of proper body mechanics, energy conservation, and fall prevention strategies during tasks listed above  Patient educated on role of OT, POC, and goals for therapy  Patient educated on importance of OOB activities with staff member assistance and sitting OOB majority of the day      Patient clear to stand pivot transfer with RN/PCT, assist x(A) AS NEEDED BASED ON PRESENCE OF VERTIGO .    Patient left  UP IN B/S CHAIR  with all lines intact, call button in reach, and BROTHER present.    GOALS:   Multidisciplinary Problems       Occupational Therapy Goals          Problem: Occupational Therapy    Goal Priority Disciplines Outcome Interventions   Occupational Therapy Goal     OT, PT/OT     Description: NO SKILLED OT INTERVENTION NEEDS IDENTIFIED.                         History:     Past Medical History:   Diagnosis Date    Anxiety     GERD (gastroesophageal reflux disease)     Hyperlipidemia     Hypertension          Past Surgical History:   Procedure Laterality Date    ANTERIOR CERVICAL DISCECTOMY W/ FUSION Bilateral 1/9/2020    Procedure: DISCECTOMY, SPINE, CERVICAL, ANTERIOR APPROACH, WITH FUSION  ;  Surgeon: Ari Grossman MD;  Location: [facility];   Service: Neurosurgery;  Laterality: Bilateral;    COLONOSCOPY N/A 12/7/2021    Procedure: COLONOSCOPY;  Surgeon: Nirmala Mehta MD;  Location: [facility] ENDO;  Service: Endoscopy;  Laterality: N/A;    EPIDURAL STEROID INJECTION INTO CERVICAL SPINE N/A 12/13/2019    Procedure: C7/T1 IL MOE;  Surgeon: Parveen Goldstein MD;  Location: [facility] PAIN MGT;  Service: Pain Management;  Laterality: N/A;    KNEE SURGERY Left     SURGICAL REMOVAL OF PILONIDAL CYST      SURGICAL REMOVAL OF VERTEBRAL BODY OF CERVICAL SPINE  1/9/2020    Procedure: CORPECTOMY, SPINE, CERVICAL;  Surgeon: Ari Grossman MD;  Location: [facility] OR;  Service: Neurosurgery;;    TONSILLECTOMY         Time Tracking:     OT Date of Treatment: 06/04/23  OT Start Time: 1028  OT Stop Time: 1043  OT Total Time (min): 15 min    Billable Minutes: Evaluation 15    6/4/2023

## 2023-06-04 NOTE — ASSESSMENT & PLAN NOTE
-CT head negative for acute intracranial pathology.    -symptoms started after inhaling carburetor fumes.    -placed in observation for TIA rule out versus others.    -MRI brain: Normal, MRA brain: Unremarkable MRA brain.   - PT/OT/ST pending.  -continues to have significant vertigo, trial of Antivert.  -IV fluids.  -supportive care.    Of note, patient's nystagmus still present this morning.  Recommend reconsulting neuro if all work up is negative and no improvement in symptoms.

## 2023-06-04 NOTE — PLAN OF CARE
OT EVAL COMPLETE.  NO SKILLED OT INTERVENTION NEEDS IDENTIFIED.    OT RECOMMENDS OP TO ADDRESS VERTIGO IF IT PERSISTS. AT D/C.

## 2023-06-04 NOTE — PROGRESS NOTES
[facility] Medicine  Progress Note    Patient Name: Sae Santiago  MRN: [id number]  Patient Class: OP- Observation   Admission Date: 6/3/2023  Length of Stay: 0 days  Attending Physician: Usman Milton MD  Primary Care Provider: SKYE Reyes Jr, MD        Subjective:     Principal Problem:Vertigo        HPI:  Mr. Brewster is a 52-year-old  male with PMH significant for hypertension, hyperlipidemia, anxiety, presented to the ED complaining of severe vertigo since early this morning.  Stated that his symptoms started happening after he cleaned the carburetor, and inhaled some of the fumes.  Has been having severe dizziness, vertigo, room spinning since then.  Found to have nystagmus in the ED.  CT head is negative for acute intracranial pathology.  Patient is unable to stand/ambulate due to vertigo.  ED physician discussed case with tele neurology, recommended placing in observation for TIA workup.  Patient has no other focal neurological deficits at this time.      Overview/Hospital Course:  No notes on file    Interval History: Patient continues to have vertigo with movement and nystagmus to the left.  Echo: ejection fraction is 65%., MRI: Normal MR brain. MRA: Unremarkable MRA brain. PT/OT/ST pending.  TIA work up per tele neurology.  Patient denies HA, N/V/D.  BP noted to be elevated, but AM meds had not yet been given.  Also patient expressed anxiety, resumed home ativan.    Review of Systems  Objective:     Vital Signs (Most Recent):  Temp: 99 °F (37.2 °C) (06/04/23 0846)  Pulse: 82 (06/04/23 0846)  Resp: 18 (06/04/23 0846)  BP: (!) 177/81 (06/04/23 1009)  SpO2: 95 % (06/04/23 0846) Vital Signs (24h Range):  Temp:  [98 °F (36.7 °C)-99 °F (37.2 °C)] 99 °F (37.2 °C)  Pulse:  [55-82] 82  Resp:  [16-19] 18  SpO2:  [92 %-100 %] 95 %  BP: (137-177)/(65-88) 177/81     Weight: 114.9 kg (253 lb 3.2 oz)  Body mass index is 33.41 kg/m².    Intake/Output Summary (Last 24 hours) at 6/4/2023  1056  Last data filed at 6/4/2023 1016  Gross per 24 hour   Intake 120 ml   Output 800 ml   Net -680 ml         Physical Exam  Vitals and nursing note reviewed.   Constitutional:       Appearance: Normal appearance.   HENT:      Head: Normocephalic and atraumatic.      Nose: Nose normal.      Mouth/Throat:      Mouth: Mucous membranes are moist.   Eyes:      Extraocular Movements: Extraocular movements intact.      Conjunctiva/sclera: Conjunctivae normal.      Pupils: Pupils are equal, round, and reactive to light.      Comments: Nystagmus noted when looking left.   Cardiovascular:      Rate and Rhythm: Normal rate and regular rhythm.      Pulses: Normal pulses.      Heart sounds: Normal heart sounds.   Pulmonary:      Effort: Pulmonary effort is normal.      Breath sounds: Normal breath sounds.   Abdominal:      General: Abdomen is flat. Bowel sounds are normal.      Palpations: Abdomen is soft.   Musculoskeletal:         General: Normal range of motion.      Cervical back: Normal range of motion and neck supple.   Skin:     General: Skin is warm.      Capillary Refill: Capillary refill takes less than 2 seconds.   Neurological:      Mental Status: He is alert and oriented to person, place, and time. Mental status is at baseline.   Psychiatric:         Mood and Affect: Mood normal.         Behavior: Behavior normal.           Significant Labs: All pertinent labs within the past 24 hours have been reviewed.  Recent Lab Results  (Last 5 results in the past 24 hours)        06/04/23  0832   06/04/23  0523   06/03/23  1949   06/03/23  1931   06/03/23  1810        Albumin   3.6             Alkaline Phosphatase   51             ALT   28             Anion Gap   11             Ao peak curtis 1.23               Ao VTI 25.7               Appearance, UA     Clear           aPTT   25.6  Comment: Refer to local heparin nomogram for intensity/dose specific   therapeutic   range.               AST   20             AV valve area  3.06               AV mean gradient 4               AV index (prosthetic) 0.91               AV peak gradient 6               AV Velocity Ratio 0.71               Baso #   0.03             Basophil %   0.4             Bilirubin (UA)     Negative           BILIRUBIN TOTAL   0.5  Comment: For infants and newborns, interpretation of results should be based  on gestational age, weight and in agreement with clinical  observations.    Premature Infant recommended reference ranges:  Up to 24 hours.............<8.0 mg/dL  Up to 48 hours............<12.0 mg/dL  3-5 days..................<15.0 mg/dL  6-29 days.................<15.0 mg/dL               BSA 2.43               BUN   14             Calcium   8.9             Carboxyhemoglobin         1.1       Chloride   104             Cholesterol   119  Comment: The National Cholesterol Education Program (NCEP) has set the  following guidelines (reference ranges) for Cholesterol:  Optimal.....................<200 mg/dL  Borderline High.............200-239 mg/dL  High........................> or = 240 mg/dL               CO2   25             Color, UA     Yellow           Creatinine   1.0             Left Ventricle Relative Wall Thickness 0.44               Differential Method   Automated             E/A ratio 1.38               E/E' ratio 5.70               eGFR   >60             EF 65               Eos #   0.1             Eosinophil %   0.7             Estimated Avg Glucose   128             E wave deceleration time 251.38               FS 28               Glucose   146             Glucose, UA     Negative           Gran # (ANC)   5.0             Gran %   66.4             HDL   31  Comment: The National Cholesterol Education Program (NCEP) has set the  following guidelines (reference values) for HDL Cholesterol:  Low...............<40 mg/dL  Optimal...........>60 mg/dL               HDL/Cholesterol Ratio   26.1             Hematocrit   39.9             Hemoglobin   13.2              Hemoglobin A1C External   6.1  Comment: ADA Screening Guidelines:  5.7-6.4%  Consistent with prediabetes  >or=6.5%  Consistent with diabetes    High levels of fetal hemoglobin interfere with the HbA1C  assay. Heterozygous hemoglobin variants (HbS, HgC, etc)do  not significantly interfere with this assay.   However, presence of multiple variants may affect accuracy.               Immature Grans (Abs)   0.01  Comment: Mild elevation in immature granulocytes is non specific and   can be seen in a variety of conditions including stress response,   acute inflammation, trauma and pregnancy. Correlation with other   laboratory and clinical findings is essential.               Immature Granulocytes   0.1             INR   1.0  Comment: Coumadin Therapy:  2.0 - 3.0 for INR for all indicators except mechanical heart valves  and antiphospholipid syndromes which should use 2.5 - 3.5.               IVRT 76.12               IVSd 1.08               Ketones, UA     Negative           Left Atrium Major Axis 5.78               Left Atrium Minor Axis 4.89               LA size 3.33               LVOT area 3.4               LDL Cholesterol External   56.6  Comment: The National Cholesterol Education Program (NCEP) has set the  following guidelines (reference values) for LDL Cholesterol:  Optimal.......................<130 mg/dL  Borderline High...............130-159 mg/dL  High..........................160-189 mg/dL  Very High.....................>190 mg/dL               Leukocytes, UA     Negative           LV LATERAL E/E' RATIO 5.92               LV SEPTAL E/E' RATIO 5.50               LV EDV .27               LV Diastolic Volume Index 49.27               LVIDd 4.98               LVIDs 3.57               LV mass 203.24               LV Mass Index 85               Left Ventricular Outflow Tract Mean Gradient 1.73               Left Ventricular Outflow Tract Mean Velocity 0.62               LVOT diameter 2.07                LVOT peak irwin 0.87               LVOT stroke volume 78.71               LVOT peak VTI 23.40               LV ESV BP 53.22               LV Systolic Volume Index 22.4               Lymph #   1.9             Lymph %   24.6             Magnesium   2.1             MCH   27.4             MCHC   33.1             MCV   83             Mean e' 0.14               Mono #   0.6             Mono %   7.8             MPV   9.2             MV Peak A Irwin 0.56               MV Peak E Irwin 0.77               NITRITE UA     Negative           Non-HDL Cholesterol   88  Comment: Risk category and Non-HDL cholesterol goals:  Coronary heart disease (CHD)or equivalent (10-year risk of CHD >20%):  Non-HDL cholesterol goal     <130 mg/dL  Two or more CHD risk factors and 10-year risk of CHD <= 20%:  Non-HDL cholesterol goal     <160 mg/dL  0 to 1 CHD risk factor:  Non-HDL cholesterol goal     <190 mg/dL               nRBC   0             Occult Blood UA     Negative           pH, UA     6.0           Phosphorus   3.2             Platelets   200             Potassium   3.9             PROTEIN TOTAL   6.2             Protein, UA     Negative  Comment: Recommend a 24 hour urine protein or a urine   protein/creatinine ratio if globulin induced proteinuria is  clinically suspected.             Protime   11.0             Pulmonary Valve Mean Velocity 1.03               PV PEAK VELOCITY 1.37               Posterior Wall 1.10               Right Atrial Pressure (from IVC) 3               RA Major Axis 6.06               RBC   4.82             RDW   12.5             Sodium   140             Specific Gravity, UA     1.020           Specimen UA     Urine, Clean Catch           STJ 2.60               TDI SEPTAL 0.14               TDI LATERAL 0.13               Total Cholesterol/HDL Ratio   3.8             Triglycerides   157  Comment: The National Cholesterol Education Program (NCEP) has set the  following guidelines (reference values) for  triglycerides:  Normal......................<150 mg/dL  Borderline High.............150-199 mg/dL  High........................200-499 mg/dL               Triscuspid Valve Regurgitation Peak Gradient 18               TR Max Irwin 2.10               Troponin I   0.094  Comment: The reference interval for Troponin I represents the 99th percentile   cutoff   for our facility and is consistent with 3rd generation assay   performance.               TSH       0.548         TV rest pulmonary artery pressure 21               UROBILINOGEN UA     Negative           WBC   7.55                                    Significant Imaging:   MRA Brain   Final Result      Unremarkable MRA brain.         Electronically signed by: Luis Rubio MD   Date:    06/04/2023   Time:    10:55      MRI BRAIN WITHOUT CONTRAST   Final Result      Normal MR brain.         Electronically signed by: Luis Rubio MD   Date:    06/04/2023   Time:    10:52      CT Head Without Contrast   Final Result      Normal head CT.      All CT scans at this facility are performed  using dose modulation techniques as appropriate to performed exam including the following:  automated exposure control; adjustment of mA and/or kV according to the patients size (this includes techniques or standardized protocols for targeted exams where dose is matched to indication/reason for exam: i.e. extremities or head);  iterative reconstruction technique.         Electronically signed by: Luis Rubio MD   Date:    06/03/2023   Time:    14:40      X-Ray Chest AP Portable   Final Result      Normal chest x-ray.         Electronically signed by: Luis Rubio MD   Date:    06/03/2023   Time:    14:37             Assessment/Plan:      * Vertigo  -CT head negative for acute intracranial pathology.    -symptoms started after inhaling carburetor fumes.    -placed in observation for TIA rule out versus others.    -MRI brain: Normal, MRA brain: Unremarkable MRA brain.   - PT/OT/ST  pending.  -continues to have significant vertigo, trial of Antivert.  -IV fluids.  -supportive care.    Of note, patient's nystagmus still present this morning.  Recommend reconsulting neuro if all work up is negative and no improvement in symptoms.    ZORAN (generalized anxiety disorder)  -Ativan as needed prior to MRI in a.m.  -Resume PRN home ativan      Hyperlipidemia  -continue statin      HTN (hypertension)  -continue home medications.    -IV hydralazine as needed        VTE Risk Mitigation (From admission, onward)         Ordered     IP VTE LOW RISK PATIENT  Once         06/03/23 1909     Place sequential compression device  Until discontinued         06/03/23 1909                Discharge Planning   [place]:      Code Status: Full Code   Is the patient medically ready for discharge?:     Reason for patient still in hospital (select all that apply): Patient trending condition  Discharge Plan A: Home with family                  Humberto Johnson MD  Department of Hospital Medicine   [facility]

## 2023-06-04 NOTE — PLAN OF CARE
PT INDEP WITH MOBILITY, PT REPORTS DIZZINESS IS MUCH IMPROVED.  P.T. DC REC: HOME, NEEDS RW, OP P.T. FOR VESTIBULAR EVAL AND TREAT IF VERTIGO PERSISTS

## 2023-06-04 NOTE — ASSESSMENT & PLAN NOTE
-CT head negative for acute intracranial pathology.    -symptoms started after inhaling carburetor fumes.    -placed in observation for TIA rule out versus others.    -MRI brain in a.m..    -continues to have significant vertigo, trial of Antivert.  -IV fluids.  -supportive care.

## 2023-06-04 NOTE — PLAN OF CARE
[facility]  Discharge Assessment    Primary Care Provider: SKYE Reyes Jr, MD     Discharge Assessment (most recent)       BRIEF DISCHARGE ASSESSMENT - 06/04/23 0942          Discharge Planning    Assessment Type Discharge Planning Brief Assessment     Resource/Environmental Concerns none     Support Systems Spouse/significant other     Equipment Currently Used at Home none     Current Living Arrangements home     Patient/Family Anticipates Transition to home     Patient/Family Anticipated Services at Transition none     DME Needed Upon Discharge  none     Discharge Plan A Home with family                     Independent with adls, no anticipated dc needs.

## 2023-06-04 NOTE — HPI
Mr. Brewster is a 52-year-old  male with PMH significant for hypertension, hyperlipidemia, anxiety, presented to the ED complaining of severe vertigo since early this morning.  Stated that his symptoms started happening after he cleaned the carburetor, and inhaled some of the fumes.  Has been having severe dizziness, vertigo, room spinning since then.  Found to have nystagmus in the ED.  CT head is negative for acute intracranial pathology.  Patient is unable to stand/ambulate due to vertigo.  ED physician discussed case with tele neurology, recommended placing in observation for TIA workup.  Patient has no other focal neurological deficits at this time.

## 2023-06-04 NOTE — SUBJECTIVE & OBJECTIVE
Past Medical History:   Diagnosis Date    Anxiety     GERD (gastroesophageal reflux disease)     Hyperlipidemia     Hypertension        Past Surgical History:   Procedure Laterality Date    ANTERIOR CERVICAL DISCECTOMY W/ FUSION Bilateral 1/9/2020    Procedure: DISCECTOMY, SPINE, CERVICAL, ANTERIOR APPROACH, WITH FUSION  ;  Surgeon: Ari Grossman MD;  Location: [facility] OR;  Service: Neurosurgery;  Laterality: Bilateral;    COLONOSCOPY N/A 12/7/2021    Procedure: COLONOSCOPY;  Surgeon: Nirmala Mehta MD;  Location: [facility] ENDO;  Service: Endoscopy;  Laterality: N/A;    EPIDURAL STEROID INJECTION INTO CERVICAL SPINE N/A 12/13/2019    Procedure: C7/T1 IL MOE;  Surgeon: Parveen Goldstein MD;  Location: [facility] PAIN MGT;  Service: Pain Management;  Laterality: N/A;    KNEE SURGERY Left     SURGICAL REMOVAL OF PILONIDAL CYST      SURGICAL REMOVAL OF VERTEBRAL BODY OF CERVICAL SPINE  1/9/2020    Procedure: CORPECTOMY, SPINE, CERVICAL;  Surgeon: Ari Grossman MD;  Location: [facility] OR;  Service: Neurosurgery;;    TONSILLECTOMY         Review of patient's allergies indicates:   Allergen Reactions    Lexapro [escitalopram oxalate]      Serotonin Syndrome ( Pt was seen in the emergency department)    Sulfa (sulfonamide antibiotics)      Room spinning with cold sweats    Sulfamethoxazole-trimethoprim        No current facility-administered medications on file prior to encounter.     Current Outpatient Medications on File Prior to Encounter   Medication Sig    acetaminophen (TYLENOL) 500 MG tablet Take 1,000 mg by mouth every 6 (six) hours as needed for Pain.    busPIRone (BUSPAR) 5 MG Tab Take 1 tablet (5 mg total) by mouth 3 (three) times daily.    cyclobenzaprine (FLEXERIL) 10 MG tablet TAKE 1 TABLET BY MOUTH NIGHTLY AS NEEDED FOR MUSCLE SPASM    diclofenac sodium (VOLTAREN) 1 % Gel Apply 2 g topically 4 (four) times daily.    flu vacc [id number] 6mos up,PF, (FLUARIX QUAD [id number], PF,) 60 mcg (15 mcg x 4)/0.5 mL Syrg Inject  into the muscle    fluconazole (DIFLUCAN) 150 MG Tab Take 1 tablet (150 mg total) by mouth once a week. for 6 doses    hydroCHLOROthiazide (HYDRODIURIL) 25 MG tablet Take 1 tablet (25 mg total) by mouth once daily.    LORazepam (ATIVAN) 0.5 MG tablet Take 1 tablet (0.5 mg total) by mouth as needed for Anxiety.    losartan (COZAAR) 100 MG tablet Take 1 tablet (100 mg total) by mouth once daily.    methylPREDNISolone (MEDROL DOSEPACK) 4 mg tablet use as directed    metoprolol succinate (TOPROL-XL) 25 MG 24 hr tablet TAKE 1 and 1/2 (ONE & ONE-HALF) TABLETS BY MOUTH AT NIGHT    multivitamin (THERAGRAN) per tablet Take 1 tablet by mouth once daily.    rosuvastatin (CRESTOR) 40 MG Tab Take 1 tablet (40 mg total) by mouth every evening.    traMADol (ULTRAM) 50 mg tablet Take 1 tablet (50 mg total) by mouth every 8 (eight) hours as needed for Pain.    valACYclovir (VALTREX) 1000 MG tablet Take 1 tablet (1,000 mg total) by mouth 3 (three) times daily. for 7 days (Patient not taking: Reported on 4/29/2022)     Family History       Problem Relation (Age of Onset)    Hypertension Father          Tobacco Use    Smoking status: Never    Smokeless tobacco: Never   Substance and Sexual Activity    Alcohol use: No    Drug use: No    Sexual activity: Yes     Partners: Female     Review of Systems   Constitutional: Negative.  Negative for chills and fever.   HENT: Negative.  Negative for congestion, rhinorrhea, sore throat and trouble swallowing.    Eyes: Negative.  Negative for visual disturbance.   Respiratory: Negative.  Negative for cough, shortness of breath and wheezing.    Cardiovascular: Negative.  Negative for chest pain and palpitations.   Gastrointestinal: Negative.  Negative for abdominal pain, diarrhea, nausea and vomiting.   Endocrine: Negative.    Genitourinary: Negative.  Negative for dysuria and flank pain.   Musculoskeletal: Negative.  Negative for back pain.   Skin: Negative.  Negative for rash.    Allergic/Immunologic: Negative.    Neurological:  Positive for dizziness, light-headedness (vertigo) and headaches. Negative for speech difficulty, weakness and numbness.   Hematological: Negative.    Psychiatric/Behavioral:  Negative for hallucinations. The patient is nervous/anxious.    All other systems reviewed and are negative.  Objective:     Vital Signs (Most Recent):  Temp: 98 °F (36.7 °C) (06/03/23 1845)  Pulse: 74 (06/03/23 1810)  Resp: 18 (06/03/23 1642)  BP: (!) 173/76 (06/03/23 1800)  SpO2: 97 % (06/03/23 1810) Vital Signs (24h Range):  Temp:  [98 °F (36.7 °C)] 98 °F (36.7 °C)  Pulse:  [65-80] 74  Resp:  [16-18] 18  SpO2:  [94 %-100 %] 97 %  BP: (148-173)/(71-88) 173/76        There is no height or weight on file to calculate BMI.     Physical Exam  Vitals and nursing note reviewed.   Constitutional:       General: He is awake. He is not in acute distress.     Appearance: He is not ill-appearing.   HENT:      Head: Normocephalic and atraumatic.      Mouth/Throat:      Mouth: Mucous membranes are moist.   Eyes:      General: No scleral icterus.     Conjunctiva/sclera: Conjunctivae normal.   Cardiovascular:      Rate and Rhythm: Normal rate and regular rhythm.      Heart sounds: No murmur heard.  Pulmonary:      Effort: Pulmonary effort is normal. No respiratory distress.      Breath sounds: Normal breath sounds. No wheezing.   Abdominal:      Palpations: Abdomen is soft.      Tenderness: There is no abdominal tenderness.   Musculoskeletal:         General: No swelling. Normal range of motion.      Cervical back: Normal range of motion and neck supple.   Skin:     General: Skin is warm.      Coloration: Skin is not jaundiced.   Neurological:      General: No focal deficit present.      Mental Status: He is alert and oriented to person, place, and time. Mental status is at baseline.   Psychiatric:         Attention and Perception: Attention normal.         Mood and Affect: Mood is anxious.          Speech: Speech normal.         Behavior: Behavior is cooperative.              Significant Labs: All pertinent labs within the past 24 hours have been reviewed.  BMP:   Recent Labs   Lab 06/03/23  1428   *      K 4.1      CO2 27   BUN 22*   CREATININE 1.1   CALCIUM 9.5     CBC:   Recent Labs   Lab 06/03/23  1428   WBC 8.30   HGB 15.1   HCT 45.2        CMP:   Recent Labs   Lab 06/03/23  1428      K 4.1      CO2 27   *   BUN 22*   CREATININE 1.1   CALCIUM 9.5   PROT 7.5   ALBUMIN 4.3   BILITOT 0.6   ALKPHOS 56   AST 31   ALT 39   ANIONGAP 13     Troponin:   Recent Labs   Lab 06/03/23  1428 06/03/23  1653   TROPONINI <0.006 <0.006       Significant Imaging: I have reviewed all pertinent imaging results/findings within the past 24 hours.  I have reviewed and interpreted all pertinent imaging results/findings within the past 24 hours.    Imaging Results              CT Head Without Contrast (Final result)  Result time 06/03/23 14:40:33      Final result by Luis Rubio MD (06/03/23 14:40:33)                   Impression:      Normal head CT.    All CT scans at this facility are performed  using dose modulation techniques as appropriate to performed exam including the following:  automated exposure control; adjustment of mA and/or kV according to the patients size (this includes techniques or standardized protocols for targeted exams where dose is matched to indication/reason for exam: i.e. extremities or head);  iterative reconstruction technique.      Electronically signed by: Luis Rubio MD  Date:    06/03/2023  Time:    14:40               Narrative:    EXAMINATION:  CT HEAD WITHOUT CONTRAST    CLINICAL HISTORY:  Dizziness, persistent/recurrent, cardiac or vascular cause suspected; Dizziness and giddiness    TECHNIQUE:  Routine noncontrast head CT.    COMPARISON:  None    FINDINGS:  There is no acute intracranial hemorrhage or abnormal extra-axial fluid collection.   There is no abnormal increased or decreased density within the brain parenchyma.  Gray-white differentiation preserved normal ventricles.  There is no intracranial mass or mass effect.  The calvarium is intact visualized paranasal sinuses mastoid air cells are well aerated.                                       X-Ray Chest AP Portable (Final result)  Result time 06/03/23 14:37:08      Final result by Luis Rubio MD (06/03/23 14:37:08)                   Impression:      Normal chest x-ray.      Electronically signed by: Luis Rubio MD  Date:    06/03/2023  Time:    14:37               Narrative:    EXAMINATION:  XR CHEST AP PORTABLE    CLINICAL HISTORY:  dizziness;    FINDINGS:  Comparison study 12/24/2019.  No change.  Normal size heart.  The lungs are clear.                                    I have independently reviewed and interpreted the EKG.     I have independently reviewed all pertinent labs within the past 24 hours.    I have independently reviewed, visualized and interpreted all pertinent imaging results within the past 24 hours and discussed the findings with the ED physician.

## 2023-06-04 NOTE — PLAN OF CARE
Pt remains free from falls/injuries this shift. Safety precautions maintained. Pain managed with relaxation and pain medication. Patient in bed, still having dizziness but improves with medications. Tele monitoring per orders. No s/s of acute distress noted. Will continue to monitor. Chart check completed.

## 2023-06-04 NOTE — PLAN OF CARE
S.T. evaluation completed this date.   Pt with no swallowing or speech difficulties.  He did report some new occasional word-finding difficulties and will be further assessed with goals as indicated.

## 2023-06-04 NOTE — H&P
[facility] Emergency Dept.  [facility] Medicine  History & Physical    Patient Name: Sae Santiago  MRN: [id number]  Patient Class: OP- Observation  Admission Date: 6/3/2023  Attending Physician: Usman Milton MD   Primary Care Provider: SKYE Reyes Jr, MD         Patient information was obtained from patient, past medical records and ER records.     Subjective:     Principal Problem:Vertigo    Chief Complaint:   Chief Complaint   Patient presents with    Toxic Inhalation     Brought by [facility] for inhalation of carburetor fumes today at 11am. C/o dizziness, diaphoretic, and nystagmus.        HPI: Mr. Brewster is a 52-year-old  male with PMH significant for hypertension, hyperlipidemia, anxiety, presented to the ED complaining of severe vertigo since early this morning.  Stated that his symptoms started happening after he cleaned the carburetor, and inhaled some of the fumes.  Has been having severe dizziness, vertigo, room spinning since then.  Found to have nystagmus in the ED.  CT head is negative for acute intracranial pathology.  Patient is unable to stand/ambulate due to vertigo.  ED physician discussed case with tele neurology, recommended placing in observation for TIA workup.  Patient has no other focal neurological deficits at this time.      Past Medical History:   Diagnosis Date    Anxiety     GERD (gastroesophageal reflux disease)     Hyperlipidemia     Hypertension        Past Surgical History:   Procedure Laterality Date    ANTERIOR CERVICAL DISCECTOMY W/ FUSION Bilateral 1/9/2020    Procedure: DISCECTOMY, SPINE, CERVICAL, ANTERIOR APPROACH, WITH FUSION  ;  Surgeon: Ari Grossman MD;  Location: [facility];  Service: Neurosurgery;  Laterality: Bilateral;    COLONOSCOPY N/A 12/7/2021    Procedure: COLONOSCOPY;  Surgeon: Nirmala Mehta MD;  Location: [facility];  Service: Endoscopy;  Laterality: N/A;    EPIDURAL STEROID INJECTION INTO CERVICAL SPINE N/A 12/13/2019    Procedure: C7/T1 IL  MOE;  Surgeon: Parveen Goldstein MD;  Location: [facility] PAIN MGT;  Service: Pain Management;  Laterality: N/A;    KNEE SURGERY Left     SURGICAL REMOVAL OF PILONIDAL CYST      SURGICAL REMOVAL OF VERTEBRAL BODY OF CERVICAL SPINE  1/9/2020    Procedure: CORPECTOMY, SPINE, CERVICAL;  Surgeon: Ari Grossman MD;  Location: [facility] OR;  Service: Neurosurgery;;    TONSILLECTOMY         Review of patient's allergies indicates:   Allergen Reactions    Lexapro [escitalopram oxalate]      Serotonin Syndrome ( Pt was seen in the emergency department)    Sulfa (sulfonamide antibiotics)      Room spinning with cold sweats    Sulfamethoxazole-trimethoprim        No current facility-administered medications on file prior to encounter.     Current Outpatient Medications on File Prior to Encounter   Medication Sig    acetaminophen (TYLENOL) 500 MG tablet Take 1,000 mg by mouth every 6 (six) hours as needed for Pain.    busPIRone (BUSPAR) 5 MG Tab Take 1 tablet (5 mg total) by mouth 3 (three) times daily.    cyclobenzaprine (FLEXERIL) 10 MG tablet TAKE 1 TABLET BY MOUTH NIGHTLY AS NEEDED FOR MUSCLE SPASM    diclofenac sodium (VOLTAREN) 1 % Gel Apply 2 g topically 4 (four) times daily.    flu vacc [id number] 6mos up,PF, (FLUARIX QUAD 2721-6411, PF,) 60 mcg (15 mcg x 4)/0.5 mL Syrg Inject into the muscle    fluconazole (DIFLUCAN) 150 MG Tab Take 1 tablet (150 mg total) by mouth once a week. for 6 doses    hydroCHLOROthiazide (HYDRODIURIL) 25 MG tablet Take 1 tablet (25 mg total) by mouth once daily.    LORazepam (ATIVAN) 0.5 MG tablet Take 1 tablet (0.5 mg total) by mouth as needed for Anxiety.    losartan (COZAAR) 100 MG tablet Take 1 tablet (100 mg total) by mouth once daily.    methylPREDNISolone (MEDROL DOSEPACK) 4 mg tablet use as directed    metoprolol succinate (TOPROL-XL) 25 MG 24 hr tablet TAKE 1 and 1/2 (ONE & ONE-HALF) TABLETS BY MOUTH AT NIGHT    multivitamin (THERAGRAN) per tablet Take 1 tablet by mouth once  daily.    rosuvastatin (CRESTOR) 40 MG Tab Take 1 tablet (40 mg total) by mouth every evening.    traMADol (ULTRAM) 50 mg tablet Take 1 tablet (50 mg total) by mouth every 8 (eight) hours as needed for Pain.    valACYclovir (VALTREX) 1000 MG tablet Take 1 tablet (1,000 mg total) by mouth 3 (three) times daily. for 7 days (Patient not taking: Reported on 4/29/2022)     Family History       Problem Relation (Age of Onset)    Hypertension Father          Tobacco Use    Smoking status: Never    Smokeless tobacco: Never   Substance and Sexual Activity    Alcohol use: No    Drug use: No    Sexual activity: Yes     Partners: Female     Review of Systems   Constitutional: Negative.  Negative for chills and fever.   HENT: Negative.  Negative for congestion, rhinorrhea, sore throat and trouble swallowing.    Eyes: Negative.  Negative for visual disturbance.   Respiratory: Negative.  Negative for cough, shortness of breath and wheezing.    Cardiovascular: Negative.  Negative for chest pain and palpitations.   Gastrointestinal: Negative.  Negative for abdominal pain, diarrhea, nausea and vomiting.   Endocrine: Negative.    Genitourinary: Negative.  Negative for dysuria and flank pain.   Musculoskeletal: Negative.  Negative for back pain.   Skin: Negative.  Negative for rash.   Allergic/Immunologic: Negative.    Neurological:  Positive for dizziness, light-headedness (vertigo) and headaches. Negative for speech difficulty, weakness and numbness.   Hematological: Negative.    Psychiatric/Behavioral:  Negative for hallucinations. The patient is nervous/anxious.    All other systems reviewed and are negative.  Objective:     Vital Signs (Most Recent):  Temp: 98 °F (36.7 °C) (06/03/23 1845)  Pulse: 74 (06/03/23 1810)  Resp: 18 (06/03/23 1642)  BP: (!) 173/76 (06/03/23 1800)  SpO2: 97 % (06/03/23 1810) Vital Signs (24h Range):  Temp:  [98 °F (36.7 °C)] 98 °F (36.7 °C)  Pulse:  [65-80] 74  Resp:  [16-18] 18  SpO2:  [94 %-100  %] 97 %  BP: (148-173)/(71-88) 173/76        There is no height or weight on file to calculate BMI.     Physical Exam  Vitals and nursing note reviewed.   Constitutional:       General: He is awake. He is not in acute distress.     Appearance: He is not ill-appearing.   HENT:      Head: Normocephalic and atraumatic.      Mouth/Throat:      Mouth: Mucous membranes are moist.   Eyes:      General: No scleral icterus.     Conjunctiva/sclera: Conjunctivae normal.   Cardiovascular:      Rate and Rhythm: Normal rate and regular rhythm.      Heart sounds: No murmur heard.  Pulmonary:      Effort: Pulmonary effort is normal. No respiratory distress.      Breath sounds: Normal breath sounds. No wheezing.   Abdominal:      Palpations: Abdomen is soft.      Tenderness: There is no abdominal tenderness.   Musculoskeletal:         General: No swelling. Normal range of motion.      Cervical back: Normal range of motion and neck supple.   Skin:     General: Skin is warm.      Coloration: Skin is not jaundiced.   Neurological:      General: No focal deficit present.      Mental Status: He is alert and oriented to person, place, and time. Mental status is at baseline.   Psychiatric:         Attention and Perception: Attention normal.         Mood and Affect: Mood is anxious.         Speech: Speech normal.         Behavior: Behavior is cooperative.              Significant Labs: All pertinent labs within the past 24 hours have been reviewed.  BMP:   Recent Labs   Lab 06/03/23  1428   *      K 4.1      CO2 27   BUN 22*   CREATININE 1.1   CALCIUM 9.5     CBC:   Recent Labs   Lab 06/03/23  1428   WBC 8.30   HGB 15.1   HCT 45.2        CMP:   Recent Labs   Lab 06/03/23  1428      K 4.1      CO2 27   *   BUN 22*   CREATININE 1.1   CALCIUM 9.5   PROT 7.5   ALBUMIN 4.3   BILITOT 0.6   ALKPHOS 56   AST 31   ALT 39   ANIONGAP 13     Troponin:   Recent Labs   Lab 06/03/23  1428 06/03/23  1653    TROPONINI <0.006 <0.006       Significant Imaging: I have reviewed all pertinent imaging results/findings within the past 24 hours.  I have reviewed and interpreted all pertinent imaging results/findings within the past 24 hours.    Imaging Results              CT Head Without Contrast (Final result)  Result time 06/03/23 14:40:33      Final result by Luis Rubio MD (06/03/23 14:40:33)                   Impression:      Normal head CT.    All CT scans at this facility are performed  using dose modulation techniques as appropriate to performed exam including the following:  automated exposure control; adjustment of mA and/or kV according to the patients size (this includes techniques or standardized protocols for targeted exams where dose is matched to indication/reason for exam: i.e. extremities or head);  iterative reconstruction technique.      Electronically signed by: Luis Rubio MD  Date:    06/03/2023  Time:    14:40               Narrative:    EXAMINATION:  CT HEAD WITHOUT CONTRAST    CLINICAL HISTORY:  Dizziness, persistent/recurrent, cardiac or vascular cause suspected; Dizziness and giddiness    TECHNIQUE:  Routine noncontrast head CT.    COMPARISON:  None    FINDINGS:  There is no acute intracranial hemorrhage or abnormal extra-axial fluid collection.  There is no abnormal increased or decreased density within the brain parenchyma.  Gray-white differentiation preserved normal ventricles.  There is no intracranial mass or mass effect.  The calvarium is intact visualized paranasal sinuses mastoid air cells are well aerated.                                       X-Ray Chest AP Portable (Final result)  Result time 06/03/23 14:37:08      Final result by Luis Rubio MD (06/03/23 14:37:08)                   Impression:      Normal chest x-ray.      Electronically signed by: Luis Rubio MD  Date:    06/03/2023  Time:    14:37               Narrative:    EXAMINATION:  XR CHEST AP PORTABLE    CLINICAL  HISTORY:  dizziness;    FINDINGS:  Comparison study 12/24/2019.  No change.  Normal size heart.  The lungs are clear.                                    I have independently reviewed and interpreted the EKG.     I have independently reviewed all pertinent labs within the past 24 hours.    I have independently reviewed, visualized and interpreted all pertinent imaging results within the past 24 hours and discussed the findings with the ED physician.          Assessment/Plan:     * Vertigo  -CT head negative for acute intracranial pathology.    -symptoms started after inhaling carburetor fumes.    -placed in observation for TIA rule out versus others.    -MRI brain in a.m..    -continues to have significant vertigo, trial of Antivert.  -IV fluids.  -supportive care.      HTN (hypertension)  -continue home medications.    -IV hydralazine as needed      Hyperlipidemia  -continue statin      ZORAN (generalized anxiety disorder)  -Ativan as needed prior to MRI in a.m.        VTE Risk Mitigation (From admission, onward)         Ordered     IP VTE LOW RISK PATIENT  Once         06/03/23 1909     Place sequential compression device  Until discontinued         06/03/23 1909                   On 06/03/2023, patient should be placed in hospital observation services under my care.        Usman Milton MD  Department of Hospital Medicine  '[place] [facility] - Emergency Dept.

## 2023-06-04 NOTE — PT/OT/SLP EVAL
Speech Language Pathology Evaluation  Cognitive/Bedside Swallow    Patient Name:  Sae Santiago   MRN:  [id number]  Admitting Diagnosis: Vertigo    Recommendations:                  General Recommendations:  Cognitive-linguistic therapy  Diet recommendations:  Regular Diet - IDDSI Level 7, Thin liquids - IDDSI Level 0   Aspiration Precautions: Standard aspiration precautions   General Precautions: Standard,    Communication strategies:  none    Assessment:     Sae Santiago is a 52 y.o. male with an SLP diagnosis of Cognitive-Linguistic Impairment.  Pt with no swallowing difficulties and recommended for a Regular consistency diet and thin liquids.  Pt was oriented, followed basic commands and answered basic yes/no questions.  He did present with some min delays in responding and he did report he was having some occasional word finding difficulties.  Further language/cognition testing is warranted.       History:     Past Medical History:   Diagnosis Date    Anxiety     GERD (gastroesophageal reflux disease)     Hyperlipidemia     Hypertension        Past Surgical History:   Procedure Laterality Date    ANTERIOR CERVICAL DISCECTOMY W/ FUSION Bilateral 1/9/2020    Procedure: DISCECTOMY, SPINE, CERVICAL, ANTERIOR APPROACH, WITH FUSION  ;  Surgeon: Ari Grossman MD;  Location: [facility] OR;  Service: Neurosurgery;  Laterality: Bilateral;    COLONOSCOPY N/A 12/7/2021    Procedure: COLONOSCOPY;  Surgeon: Nirmala Mehta MD;  Location: [facility] ENDO;  Service: Endoscopy;  Laterality: N/A;    EPIDURAL STEROID INJECTION INTO CERVICAL SPINE N/A 12/13/2019    Procedure: C7/T1 IL MOE;  Surgeon: Parveen Goldstein MD;  Location: [facility] PAIN MGT;  Service: Pain Management;  Laterality: N/A;    KNEE SURGERY Left     SURGICAL REMOVAL OF PILONIDAL CYST      SURGICAL REMOVAL OF VERTEBRAL BODY OF CERVICAL SPINE  1/9/2020    Procedure: CORPECTOMY, SPINE, CERVICAL;  Surgeon: Ari Grossman MD;  Location: [facility] OR;  Service:  Neurosurgery;;    TONSILLECTOMY         Social History: Patient lives with his wife,  he works full time and is independent with all daily living skills. .    Prior Intubation HX:  none reported     Modified Barium Swallow: none reported     Chest X-Rays: see medical chart     Prior diet: Regular consistency diet; thin liquids.    Occupation/hobbies/homemaking: n/a.    Subjective     Pt reporting vertigo is improved but still present   Patient goals: to get to feeling better before I go home      Pain/Comfort:  Pain Rating 1: 0/10 (pt reporting some eye sensitivity to light)    Respiratory Status: Room air    Objective:     Cognitive Status:    Orientation Person, Place, and Situation  Memory Immediate Recall min cues   Problem Solving WFL basic level  Safety awareness WFL        Receptive Language:   Comprehension:      [facility]    PragmaticsWF:    [facility]    Expressive Language:  Verbal:    Initiation WFL  Naming Divergent responsive min cues   Conversational speech pt communicated with occasional delays   Nonverbal:   N/a      Motor Speech:  WFL    Voice:   WFL    Visual-Spatial:  N/a    Reading:   Not assessed       Written Expression:   Not assessed     Oral Musculature Evaluation  Oral Musculature: [facility]  Dentition: present and adequate  Mucosal Quality: good  Mandibular Strength and Mobility: [facility]  Oral Labial Strength and Mobility: [facility]  Velar Elevation: [facility]  Buccal Strength and Mobility: [facility]    Bedside Swallow Eval:   Consistencies Assessed:  Thin liquids no delays, coughing or change in vocal quality  Puree no delays, coughing or changes in vocal quality   Solids no delays, coughing or changes in vocal quality       Oral Phase:   WFL    Pharyngeal Phase:   no overt clinical signs/symptoms of aspiration  no overt clinical signs/symptoms of pharyngeal dysphagia    Compensatory Strategies  None    Treatment: n/a    Goals:   Multidisciplinary Problems       SLP Goals          Problem: SLP    Goal Priority Disciplines  Outcome   SLP Goal     SLP    Description: 1. Pt will exhibit functional cognitive/linguistic skills.  2. Higher level testing of cognition with further goals if indicated.                        Plan:     Patient to be seen:  3 x/week   Plan of Care expires:     Plan of Care reviewed with:  patient   SLP Follow-Up:  Yes       Discharge recommendations:    determine at discharge   Barriers to Discharge:  None    Time Tracking:     SLP Treatment Date:   06/04/23  Speech Start Time:  1340  Speech Stop Time:  1405     Speech Total Time (min):  25 min    Billable Minutes: Eval 25 minutes      06/04/2023

## 2023-06-04 NOTE — PT/OT/SLP EVAL
"Physical Therapy Evaluation and Discharge Note    Patient Name:  Sae Santiago   MRN:  [id number]    Recommendations:     Discharge Recommendations: home (OP P.T. FOR VESTIBULAR EVAL AND TREAT IF VERTIGO PERSISTS)  Discharge Equipment Recommendations: walker, rolling (TO ASSIST IN RECOVERY)   Barriers to discharge: None    Assessment:     Sae Santiago is a 52 y.o. male admitted with a medical diagnosis of Vertigo. .  At this time, patient is functioning at their prior level of function and does not require further acute PT services.     Recent Surgery: * No surgery found *     Plan:     During this hospitalization, patient does not require further acute PT services.  Please re-consult if situation changes.      Subjective     Chief Complaint: MILD DIZZINESS WITH NAUSEA BUT REPORTS MUCH IMPROVED FROM YESTERDAY, PT DESCRIBING DIZZINESS, "I WAS AT A 10 YESTERDAY, TODAY I AM AT A 3"  PT REPORTS HE WAS GIVEN MECLIZINE YESTERDAY AND TODAY WITH SIGNIFICANT IMPROVEMENT IN DIZZINESS  Patient/Family Comments/goals:   Pain/Comfort:  Pain Rating 1: 0/10    Patients cultural, spiritual, [employer] conflicts given the current situation:      Living Environment:  PT LIVES WITH WIFE, 1 STORY HOUSE NO STEPS, AMB INDEP COMMUNITY DISTANCES, DRIVES, WORKS, INDEP WITH ADL'S  Prior to admission, patients level of function was INDEP.  Equipment used at home: none.  DME owned (not currently used): none.  Upon discharge, patient will have assistance from WIFE.    Objective:     Communicated with NURSE prior to session.  Patient found supine with peripheral IV upon PT entry to room.    General Precautions: Standard, fall    Orthopedic Precautions:N/A   Braces: N/A  Respiratory Status: Room air    Exams:  Cognitive Exam:  Patient is oriented to Person, Place, Time, and Situation  Postural Exam:  Patient presented with the following abnormalities:    -       No postural abnormalities identified  Sensation:    -       Intact  RLE " "ROM: WNL  RLE Strength: WNL  LLE ROM: WNL  LLE Strength: WNL    Functional Mobility:  Bed Mobility:     Rolling Left:  independence  Scooting: independence  Supine to Sit: independence  Transfers:     Sit to Stand:  independence with no AD  Bed to Chair: independence with  rolling walker  using  Step Transfer  Gait: PT ' WITH RW TERRI, NO LOB OR SOB ON ROOM AIR, C/O MILD DIZZINESS AND NAUSEA  Balance: GOOD SITTING AND STANDING BALANCE    AM-PAC 6 CLICK MOBILITY  Total Score:21     Treatment and Education:  PT EDUCATED IN ROLE OF P.T. IN ACUTE CARE HOSPITAL SETTING, EDUCATED IN RW USE AND SAFETY DURING TF'S AND GAIT, ENCOURAGED TO INCREASE TIME OOB IN CHAIR TO TOLERANCE, EDUCATED IN AND ENCOURAGED TO PERFORM BLE THEREX WHILE SEATED OR SUPINE THROUGHOUT THE DAY TO TOLERANCE: HIP FLEX/EXT, QUAD SET, LAQ, HEEL SLIDES, AP'S.  PT AGREEABLE TO REQUESTS  PT EDUCATED ON RISK FOR FALLS DUE TO GENERALIZED WEAKNESS, EDUCATED ON "CALL DON'T FALL", ENCOURAGED TO CALL FOR ASSISTANCE WITH ALL NEEDS SUCH AS BED<>CHAIR TRANSFERS OR TRIPS TO BATHROOM, PT AGREEABLE TO SAFETY PRECAUTIONS    AM-PAC 6 CLICK MOBILITY  Total Score:21     Patient left up in chair with all lines intact, call button in reach, NURSE notified, and FAMILY present.    GOALS:   Multidisciplinary Problems       Physical Therapy Goals       Not on file                    History:     Past Medical History:   Diagnosis Date    Anxiety     GERD (gastroesophageal reflux disease)     Hyperlipidemia     Hypertension        Past Surgical History:   Procedure Laterality Date    ANTERIOR CERVICAL DISCECTOMY W/ FUSION Bilateral 1/9/2020    Procedure: DISCECTOMY, SPINE, CERVICAL, ANTERIOR APPROACH, WITH FUSION  ;  Surgeon: Ari Grossman MD;  Location: [facility] OR;  Service: Neurosurgery;  Laterality: Bilateral;    COLONOSCOPY N/A 12/7/2021    Procedure: COLONOSCOPY;  Surgeon: Nirmala Mehta MD;  Location: [facility];  Service: Endoscopy;  Laterality: N/A;    EPIDURAL " STEROID INJECTION INTO CERVICAL SPINE N/A 12/13/2019    Procedure: C7/T1 IL MOE;  Surgeon: Parveen Goldstein MD;  Location: [facility];  Service: Pain Management;  Laterality: N/A;    KNEE SURGERY Left     SURGICAL REMOVAL OF PILONIDAL CYST      SURGICAL REMOVAL OF VERTEBRAL BODY OF CERVICAL SPINE  1/9/2020    Procedure: CORPECTOMY, SPINE, CERVICAL;  Surgeon: Ari Grossman MD;  Location: [facility];  Service: Neurosurgery;;    TONSILLECTOMY         Time Tracking:     PT Received On: 06/04/23  PT Start Time: 1030     PT Stop Time: 1045  PT Total Time (min): 15 min     Billable Minutes: Evaluation 15    06/04/2023

## 2023-06-05 ENCOUNTER — PATIENT MESSAGE (OUTPATIENT)
Dept: OPHTHALMOLOGY | Facility: CLINIC | Age: 53
End: 2023-06-05
Payer: COMMERCIAL

## 2023-06-05 VITALS
DIASTOLIC BLOOD PRESSURE: 68 MMHG | OXYGEN SATURATION: 100 % | SYSTOLIC BLOOD PRESSURE: 142 MMHG | HEIGHT: 73 IN | TEMPERATURE: 97 F | RESPIRATION RATE: 20 BRPM | BODY MASS INDEX: 33.55 KG/M2 | WEIGHT: 253.19 LBS | HEART RATE: 58 BPM

## 2023-06-05 LAB
ALBUMIN SERPL BCP-MCNC: 4.1 G/DL (ref 3.5–5.2)
ALP SERPL-CCNC: 58 U/L (ref 55–135)
ALT SERPL W/O P-5'-P-CCNC: 36 U/L (ref 10–44)
ANION GAP SERPL CALC-SCNC: 9 MMOL/L (ref 8–16)
AST SERPL-CCNC: 28 U/L (ref 10–40)
BASOPHILS # BLD AUTO: 0.04 K/UL (ref 0–0.2)
BASOPHILS NFR BLD: 0.7 % (ref 0–1.9)
BILIRUB SERPL-MCNC: 0.6 MG/DL (ref 0.1–1)
BUN SERPL-MCNC: 14 MG/DL (ref 6–20)
CALCIUM SERPL-MCNC: 9.7 MG/DL (ref 8.7–10.5)
CHLORIDE SERPL-SCNC: 102 MMOL/L (ref 95–110)
CO2 SERPL-SCNC: 28 MMOL/L (ref 23–29)
CREAT SERPL-MCNC: 1 MG/DL (ref 0.5–1.4)
DIFFERENTIAL METHOD: NORMAL
EOSINOPHIL # BLD AUTO: 0.1 K/UL (ref 0–0.5)
EOSINOPHIL NFR BLD: 1.2 % (ref 0–8)
ERYTHROCYTE [DISTWIDTH] IN BLOOD BY AUTOMATED COUNT: 12.7 % (ref 11.5–14.5)
EST. GFR  (NO RACE VARIABLE): >60 ML/MIN/1.73 M^2
GLUCOSE SERPL-MCNC: 132 MG/DL (ref 70–110)
HCT VFR BLD AUTO: 45.8 % (ref 40–54)
HGB BLD-MCNC: 14.7 G/DL (ref 14–18)
IMM GRANULOCYTES # BLD AUTO: 0.01 K/UL (ref 0–0.04)
IMM GRANULOCYTES NFR BLD AUTO: 0.2 % (ref 0–0.5)
LYMPHOCYTES # BLD AUTO: 1.6 K/UL (ref 1–4.8)
LYMPHOCYTES NFR BLD: 26.4 % (ref 18–48)
MCH RBC QN AUTO: 27.5 PG (ref 27–31)
MCHC RBC AUTO-ENTMCNC: 32.1 G/DL (ref 32–36)
MCV RBC AUTO: 86 FL (ref 82–98)
MONOCYTES # BLD AUTO: 0.4 K/UL (ref 0.3–1)
MONOCYTES NFR BLD: 5.8 % (ref 4–15)
NEUTROPHILS # BLD AUTO: 4 K/UL (ref 1.8–7.7)
NEUTROPHILS NFR BLD: 65.7 % (ref 38–73)
NRBC BLD-RTO: 0 /100 WBC
PLATELET # BLD AUTO: 185 K/UL (ref 150–450)
PMV BLD AUTO: 10.2 FL (ref 9.2–12.9)
POTASSIUM SERPL-SCNC: 4.3 MMOL/L (ref 3.5–5.1)
PROT SERPL-MCNC: 6.8 G/DL (ref 6–8.4)
RBC # BLD AUTO: 5.35 M/UL (ref 4.6–6.2)
SODIUM SERPL-SCNC: 139 MMOL/L (ref 136–145)
TROPONIN I SERPL DL<=0.01 NG/ML-MCNC: <0.006 NG/ML (ref 0–0.03)
WBC # BLD AUTO: 6.06 K/UL (ref 3.9–12.7)

## 2023-06-05 PROCEDURE — G0378 HOSPITAL OBSERVATION PER HR: HCPCS

## 2023-06-05 PROCEDURE — 80053 COMPREHEN METABOLIC PANEL: CPT | Performed by: INTERNAL MEDICINE

## 2023-06-05 PROCEDURE — 92526 ORAL FUNCTION THERAPY: CPT

## 2023-06-05 PROCEDURE — 92507 TX SP LANG VOICE COMM INDIV: CPT

## 2023-06-05 PROCEDURE — 25000003 PHARM REV CODE 250: Performed by: INTERNAL MEDICINE

## 2023-06-05 PROCEDURE — 85025 COMPLETE CBC W/AUTO DIFF WBC: CPT | Performed by: INTERNAL MEDICINE

## 2023-06-05 PROCEDURE — 99204 PR OFFICE/OUTPT VISIT, NEW, LEVL IV, 45-59 MIN: ICD-10-PCS | Mod: GT,,, | Performed by: STUDENT IN AN ORGANIZED HEALTH CARE EDUCATION/TRAINING PROGRAM

## 2023-06-05 PROCEDURE — 36415 COLL VENOUS BLD VENIPUNCTURE: CPT | Performed by: INTERNAL MEDICINE

## 2023-06-05 PROCEDURE — 99204 OFFICE O/P NEW MOD 45 MIN: CPT | Mod: GT,,, | Performed by: STUDENT IN AN ORGANIZED HEALTH CARE EDUCATION/TRAINING PROGRAM

## 2023-06-05 PROCEDURE — 84484 ASSAY OF TROPONIN QUANT: CPT | Performed by: INTERNAL MEDICINE

## 2023-06-05 PROCEDURE — 25000003 PHARM REV CODE 250: Performed by: FAMILY MEDICINE

## 2023-06-05 RX ORDER — MECLIZINE HYDROCHLORIDE 25 MG/1
25 TABLET ORAL 3 TIMES DAILY PRN
Qty: 21 TABLET | Refills: 0 | Status: SHIPPED | OUTPATIENT
Start: 2023-06-05 | End: 2023-06-12

## 2023-06-05 RX ORDER — HYDRALAZINE HYDROCHLORIDE 20 MG/ML
10 INJECTION INTRAMUSCULAR; INTRAVENOUS EVERY 6 HOURS PRN
Status: DISCONTINUED | OUTPATIENT
Start: 2023-06-05 | End: 2023-06-05 | Stop reason: HOSPADM

## 2023-06-05 RX ADMIN — MECLIZINE HYDROCHLORIDE 25 MG: 25 TABLET ORAL at 09:06

## 2023-06-05 RX ADMIN — BUSPIRONE HYDROCHLORIDE 5 MG: 5 TABLET ORAL at 09:06

## 2023-06-05 RX ADMIN — ASPIRIN 81 MG: 81 TABLET, COATED ORAL at 09:06

## 2023-06-05 RX ADMIN — ATORVASTATIN CALCIUM 40 MG: 40 TABLET, FILM COATED ORAL at 09:06

## 2023-06-05 RX ADMIN — HYDROCHLOROTHIAZIDE 25 MG: 25 TABLET ORAL at 09:06

## 2023-06-05 RX ADMIN — LOSARTAN POTASSIUM 100 MG: 50 TABLET, FILM COATED ORAL at 09:06

## 2023-06-05 NOTE — DISCHARGE INSTRUCTIONS
Please hold Ativan while taking Antivert due increase in dizziness, gait instability, and/or sedating properties

## 2023-06-05 NOTE — PLAN OF CARE
Pt being discharged Home in stable condition. IV removed, catheter intact, pt tolerated well. Tele monitor removed, given to US. Discharge instructions given to pt, pt verbalized understanding. Prescriptions given, referrals sent to ENT and PT, OT.

## 2023-06-05 NOTE — HOSPITAL COURSE
Patient admitted to observation for vertigo.  CT of head unremarkable. MRI and MRA of brain showed unremarkable and normal results. Antivert initiated with symptom improvement reported.  PT/ OT evaluation obtained with outpatient vestibular therapy recommended.  Neurology consulted with no additional inpatient recommendations. Per neurology, symptoms likely attributed to possibly toxic from CO exposure vs BPPV and outpatient follow-up with ENT recommended.  Troponin elevated the setting of uncontrolled blood pressure with no additional cardiac symptoms witnessed or reported.  Repeat troponin negative.  Blood pressure elevated in the setting of anxiety with home medications resumed and BP normalized upon repeat analysis prior to discharge.  Vital signs stable with no signs of acute distress witnessed or reported. Patient seen and examined today and deemed medically stable for discharge to home accompanied by wife.  Medications reconciled with Antivert prescribed.  Patient instructed to follow up with PCP and ENT upon discharge for further evaluation.

## 2023-06-05 NOTE — PLAN OF CARE
[facility] - Med Surg  Discharge Final Note    Primary Care Provider: SKYE Reyes Jr, MD    Expected Discharge Date: 6/5/2023    Final Discharge Note (most recent)       Final Note - 06/05/23 1327          Final Note    Assessment Type Final Discharge Note     Anticipated Discharge Disposition Home or Self Care     Hospital Resources/Appts/Education Provided Appointments scheduled by Navigator/Coordinator;Appointments scheduled and added to AVS        Post-Acute Status    Discharge Delays None known at this time                   Contact Info       SKYE Reyes Jr, MD   Specialty: Internal Medicine, Pediatrics   Relationship: PCP - General  Hypertension Digital Medicine Responsible Provider    [address]  [place] [address]   Phone: [phone]       Next Steps: Follow up in 3 day(s)    Instructions: -hospital follow up    Cayla Kamara MD   Specialty: Otolaryngology    [address]  [place] [address]   Phone: [phone]       Next Steps: Follow up in 1 week(s)    Instructions: -hospital follow up          PCP appt scheduled and added to AVS.     No additional d/c needs.

## 2023-06-05 NOTE — CONSULTS
[facility] Tele-Consultation from Neurology    Consultation started: 6/5/2023 at 11:17 AM   The chief complaint leading to consultation is:  Vertigo, nystagmus  This consultation was requested by:    The patient location is: [facility]  Spoke nurse at bedside with patient assisting consultant. Also present with the patient at the time of the consultation:spouse    Inpatient consult to Telemedicine-General Neurology  Consult performed by: Venkatesh Rodriguez MD  Consult ordered by: Alejandra Valdez MD         Subjective:     History of Present Illness:  Sae Santiago is a 52 y.o. male with a history of HTN, HLD, anxiety who presents acute onset vertigo.  Symptoms began yesterday after he was cleaning the carburetor and maybe inhaled some of the fumes. Resolved after a few minutes and then recurred. This was followed by severe nausea and vomiting.  Associated with a mild headache and also complained of 'eye stress'. Per the notes, he was noted to have nystagmus in the ER.  These symptoms have never occurred before.    Feels better today.  The vertigo today is solely provoked by head movements in any direction.  He was started on meclizine and it seems to be helping.  Denies any nausea or vomiting today.    Patient information was obtained from patient, past medical records, and ER records.    Past Medical History:   Diagnosis Date    Anxiety     GERD (gastroesophageal reflux disease)     Hyperlipidemia     Hypertension        Past Surgical History:   Procedure Laterality Date    ANTERIOR CERVICAL DISCECTOMY W/ FUSION Bilateral 1/9/2020    Procedure: DISCECTOMY, SPINE, CERVICAL, ANTERIOR APPROACH, WITH FUSION  ;  Surgeon: Ari Grossman MD;  Location: [facility] OR;  Service: Neurosurgery;  Laterality: Bilateral;    COLONOSCOPY N/A 12/7/2021    Procedure: COLONOSCOPY;  Surgeon: Nirmala Mehta MD;  Location: [facility];  Service: Endoscopy;  Laterality: N/A;    EPIDURAL STEROID INJECTION INTO  CERVICAL SPINE N/A 12/13/2019    Procedure: C7/T1 IL MOE;  Surgeon: Parveen Goldstein MD;  Location: [facility];  Service: Pain Management;  Laterality: N/A;    KNEE SURGERY Left     SURGICAL REMOVAL OF PILONIDAL CYST      SURGICAL REMOVAL OF VERTEBRAL BODY OF CERVICAL SPINE  1/9/2020    Procedure: CORPECTOMY, SPINE, CERVICAL;  Surgeon: Ari Grossman MD;  Location: [facility] OR;  Service: Neurosurgery;;    TONSILLECTOMY         Family History   Problem Relation Age of Onset    Hypertension Father     Melanoma Neg Hx     Psoriasis Neg Hx     Lupus Neg Hx     Eczema Neg Hx     Acne Neg Hx         Social History     Tobacco Use    Smoking status: Never    Smokeless tobacco: Never   Substance Use Topics    Alcohol use: No        Medications:   Current Facility-Administered Medications:     acetaminophen tablet 650 mg, 650 mg, Oral, Q6H PRN, Usman Milton MD, 650 mg at 06/04/23 1138    albuterol-ipratropium 2.5 mg-0.5 mg/3 mL nebulizer solution 3 mL, 3 mL, Nebulization, Q4H PRN, Usman Milton MD    aluminum-magnesium hydroxide-simethicone 200-200-20 mg/5 mL suspension 30 mL, 30 mL, Oral, Q6H PRN, Usman Milton MD    aspirin EC tablet 81 mg, 81 mg, Oral, Daily, Usman Milton MD, 81 mg at 06/05/23 0905    atorvastatin tablet 40 mg, 40 mg, Oral, Daily, Usman Milton MD, 40 mg at 06/05/23 0905    busPIRone tablet 5 mg, 5 mg, Oral, TID, Humberto Johnson MD, 5 mg at 06/05/23 0905    guaiFENesin 100 mg/5 ml syrup 200 mg, 200 mg, Oral, Q4H PRN, Usman Milton MD    hydrALAZINE injection 10 mg, 10 mg, Intravenous, Q6H PRN, Alejandra Valdez MD    hydroCHLOROthiazide tablet 25 mg, 25 mg, Oral, Daily, Humberto Johnson MD, 25 mg at 06/05/23 0905    LORazepam tablet 0.5 mg, 0.5 mg, Oral, Q6H PRN, Humberto Johnson MD, 0.5 mg at 06/04/23 0840    losartan tablet 100 mg, 100 mg, Oral, Daily, Humberto Johnson MD, 100 mg at 06/05/23 0905    meclizine tablet 25 mg, 25 mg, Oral, TID PRN, Usman Milton MD, 25 mg at 06/05/23 0945    metoprolol  "succinate (TOPROL-XL) 24 hr tablet 25 mg, 25 mg, Oral, QHS, Humberto Johnson MD, 25 mg at 06/04/23 2028    ondansetron injection 4 mg, 4 mg, Intravenous, Q8H PRN, Usman Milton MD, 4 mg at 06/03/23 2004    sodium chloride 0.9% flush 10 mL, 10 mL, Intravenous, PRN, Usman Milton MD    Allergies:   Review of patient's allergies indicates:   Allergen Reactions    Lexapro [escitalopram oxalate]      Serotonin Syndrome ( Pt was seen in the emergency department)    Sulfa (sulfonamide antibiotics)      Room spinning with cold sweats    Sulfamethoxazole-trimethoprim        Review Of Systems: ROS      Objective:     Vitals: Blood pressure (!) 183/84, pulse 65, temperature 98 °F (36.7 °C), resp. rate 17, height 6' 1" (1.854 m), weight 114.9 kg (253 lb 3.2 oz), SpO2 98 %. Reviewed for date of service     Constitutional: Well-developed, well-nourished, appears stated age    Neurological:    Mental status: Alert and oriented to person, place, time, and situation; no dysarthria; no aphasia; follows commands  Cranial nerves:   CN III, IV, VI: Extraocular movements full, L beating and possibly up beating nystagmus  CN V: Symmetric sensation to touch   CN VII: Face strong and symmetric bilaterally  CN XI: Strong shoulder shrug B   CN XII: Tongue appears midline   Motor: 5/5 power, normal bulk by appearance, no drift   Sensory: Intact to touch in all four extremities   Coordination: No dysmetria or tremor with outstretched hands.  Finger-to-nose intact bilaterally.  Gait:  Slow, cautious gait.  No evidence of ataxia.        Labs:Reviewed for date of service  Radiology (i.e. PET Scan, X-ray, CT, MRI): Reviewed for date of service    Assessment - Diagnosis - Goals:     Impression: Sae Santiago 52 y.o. male with a history of HTN, HLD, anxiety who presented with acute onset vertigo, nausea/vomiting that occurred while he was cleaning his carburetor.  Continued to have episodic exacerbations that were mainly provoked by head " movement.  MRI brain showed no evidence of an acute infarct.  MRA of the head showed patent vertebrobasilar system.    Today he reports feeling better.  The etiology is possibly toxic from CO exposure vs BPPV.    Recommendations:   -- No further imaging or workup required from neurology's perspective as he is continuing to improve.  -- Continue with vestibular therapy.  -- Outpatient ENT follow up.  -- Meclizine as needed.    Consultation ended:  1:05 PM     Total time spent with patient: < 30 Minutes      The attending portion of this evaluation, treatment, and documentation was performed per Venkatesh Rodriguez MD via audiovisual.    This is a consult performed through audio-visual using [employer] emilee. Patient and provider are in different locations. History and physical exam are limited due to the nature of this encounter.       Thank you for this consult. Please do not hesitate to contact us with questions.

## 2023-06-05 NOTE — DISCHARGE SUMMARY
[facility] [place] Medicine  Discharge Summary      Patient Name: Sae Santiago  MRN: [id number]  [facility]: [id number]  Patient Class: OP- Observation  Admission Date: 6/3/2023  Hospital Length of Stay: 0 days  Discharge Date and Time: 6/5/2023  3:21 PM  Attending Physician: Dr. Alejandra Valdez    Discharging Provider: Claudette Feliz NP  Primary Care Provider: SKYE Reyes Jr, MD    Primary Care Team: Networked reference to record PCT     HPI:   Mr. Brewster is a 52-year-old  male with PMH significant for hypertension, hyperlipidemia, anxiety, presented to the ED complaining of severe vertigo since early this morning.  Stated that his symptoms started happening after he cleaned the carburetor, and inhaled some of the fumes.  Has been having severe dizziness, vertigo, room spinning since then.  Found to have nystagmus in the ED.  CT head is negative for acute intracranial pathology.  Patient is unable to stand/ambulate due to vertigo.  ED physician discussed case with tele neurology, recommended placing in observation for TIA workup.  Patient has no other focal neurological deficits at this time.      * No surgery found *      Hospital Course:   Patient admitted to observation for vertigo.  CT of head unremarkable. MRI and MRA of brain showed unremarkable and normal results. Antivert initiated with symptom improvement reported.  Echo results showed EF of 65% with mild tricuspid regurgitation.  EKG showed normal sinus rhythm. PT/ OT evaluation obtained with outpatient vestibular therapy recommended.  Neurology consulted with evaluation completed and no additional inpatient recommendations. Per neurology, symptoms likely attributed to possibly toxic from CO exposure vs BPPV and outpatient follow-up with ENT recommended.  Troponin elevated the setting of uncontrolled blood pressure with no additional cardiac symptoms witnessed or reported.  Repeat troponin negative.  Blood pressure elevated in the setting  of anxiety with home medications resumed and BP normalized upon repeat analysis prior to discharge.  Vital signs stable with no signs of acute distress witnessed or reported. Patient seen and examined today and deemed medically stable for discharge to home accompanied by wife.  Medications reconciled with Antivert prescribed.  Ambulatory referral to outpatient PT/OT and ENT placed.  Patient instructed to follow up with PCP and ENT upon discharge for further evaluation.       Goals of Care Treatment Preferences:  Code Status: Full Code      Consults:   Consults (From admission, onward)          Status Ordering Provider     Inpatient consult to Telemedicine-General Neurology  Once        Provider:  (Not yet assigned)    Completed SHAZIA DUMONT     IP consult to case management/social work  Once        Provider:  (Not yet assigned)    Completed DELIA KLEIN            Final Active Diagnoses:    Diagnosis Date Noted POA    PRINCIPAL PROBLEM:  Vertigo [R42] 06/03/2023 Yes    ZORAN (generalized anxiety disorder) [F41.1] 06/03/2023 Yes    Hyperlipidemia [E78.5] 11/10/2014 Yes    HTN (hypertension) [I10] 07/31/2014 Yes      Problems Resolved During this Admission:       Discharged Condition: stable    Disposition: Home or Self Care    Follow Up:   Follow-up Information       E Jero Reyes Jr, MD Follow up in 3 day(s).    Specialties: Internal Medicine, Pediatrics  Why: -hospital follow up  Contact information:  [address]  [place] [address]  [phone]               Cayla Kamara MD Follow up in 1 week(s).    Specialty: Otolaryngology  Why: -hospital follow up  Contact information:  [address]  [place] [address] [phone]                           Patient Instructions:      Ambulatory referral/consult to Physical/Occupational Therapy   Standing Status: Future   Referral Priority: Routine Referral Type: Physical Medicine   Referral Reason: Specialty Services Required   Number of Visits  Requested: 1     Ambulatory referral/consult to ENT   Standing Status: Future   Referral Priority: Routine Referral Type: Consultation   Referral Reason: Specialty Services Required   Requested Specialty: Otolaryngology   Number of Visits Requested: 1     Diet Cardiac     Notify your health care provider if you experience any of the following:  temperature >100.4     Notify your health care provider if you experience any of the following:  increased confusion or weakness     Notify your health care provider if you experience any of the following:  persistent dizziness, light-headedness, or visual disturbances     Notify your health care provider if you experience any of the following:  severe persistent headache     Notify your health care provider if you experience any of the following:  difficulty breathing or increased cough     Notify your health care provider if you experience any of the following:  persistent nausea and vomiting or diarrhea     Activity as tolerated       Significant Diagnostic Studies: Labs: All labs within the past 24 hours have been reviewed    Pending Diagnostic Studies:       Procedure Component Value Units Date/Time    Carbon Monoxide, Blood [[id number]] Collected: 06/03/23 1635    Order Status: Sent Lab Status: In process Updated: 06/03/23 1920    Specimen: Blood            Medications:  Reconciled Home Medications:      Medication List        START taking these medications      meclizine 25 mg tablet  Commonly known as: ANTIVERT  Take 1 tablet (25 mg total) by mouth 3 (three) times daily as needed for Dizziness.            CONTINUE taking these medications      busPIRone 5 MG Tab  Commonly known as: BUSPAR  Take 1 tablet (5 mg total) by mouth 3 (three) times daily.     fluconazole 150 MG Tab  Commonly known as: DIFLUCAN  Take 1 tablet (150 mg total) by mouth once a week. for 6 doses     hydroCHLOROthiazide 25 MG tablet  Commonly known as: HYDRODIURIL  Take 1 tablet (25 mg total) by  mouth once daily.     losartan 100 MG tablet  Commonly known as: COZAAR  Take 1 tablet (100 mg total) by mouth once daily.     metoprolol succinate 25 MG 24 hr tablet  Commonly known as: TOPROL-XL  TAKE 1 and 1/2 (ONE & ONE-HALF) TABLETS BY MOUTH AT NIGHT     multivitamin per tablet  Commonly known as: THERAGRAN  Take 1 tablet by mouth once daily.     rosuvastatin 40 MG Tab  Commonly known as: CRESTOR  Take 1 tablet (40 mg total) by mouth every evening.            STOP taking these medications      acetaminophen 500 MG tablet  Commonly known as: TYLENOL     LORazepam 0.5 MG tablet  Commonly known as: ATIVAN     valACYclovir 1000 MG tablet  Commonly known as: VALTREX              Indwelling Lines/Drains at time of discharge:   Lines/Drains/Airways       None                   Time spent on the discharge of patient: > 36 minutes         Claudette Feliz NP  Department of Hospital Medicine  [facility] - Med Surg

## 2023-06-06 LAB — COHGB MFR BLD: 2 %

## 2023-06-07 ENCOUNTER — PATIENT MESSAGE (OUTPATIENT)
Dept: INTERNAL MEDICINE | Facility: CLINIC | Age: 53
End: 2023-06-07
Payer: COMMERCIAL

## 2023-06-07 ENCOUNTER — PATIENT MESSAGE (OUTPATIENT)
Dept: OTOLARYNGOLOGY | Facility: CLINIC | Age: 53
End: 2023-06-07
Payer: COMMERCIAL

## 2023-06-07 NOTE — PT/OT/SLP PROGRESS
Speech Language Pathology Discharge Summary    Patient Name:  Sae Santiago   MRN:  [id number]  Admitting Diagnosis: Vertigo    Recommendations:                 General Recommendations:  Follow-up not indicated  Diet recommendations:  Regular Diet - IDDSI Level 7, Liquid Diet Level: Thin liquids - IDDSI Level 0   Aspiration Precautions: Frequent oral care and Standard aspiration precautions   General Precautions: Standard, aspiration  Communication strategies:  none    Assessment:     Sae Santiago is a 52 y.o. male admitted to [facility] with dx Vertigo.  MRI negative for CVA or acute event.  He presents with functional communication and consuming IDDSI 7-regular solids with IDDSI 0-thin liquids without incident.  No further acute SLP intervention indicated at this time.  MD to reconsult if needed.    Subjective     Pt pending disposition.  Improving symptoms and will follow PT/OT and ENT outpatient.  No c/o pain.  Patient goals: to improve vertigo symptoms    Pain/Comfort:  0/10    Respiratory Status: Room air    Objective:     Has the patient been evaluated by SLP for swallowing?   Yes  Keep patient NPO? No   Current Respiratory Status: room air    Diet follow-up:  Pt consuming IDDSI 7-regular solids with IDDSI 0-thin liquids without incident.    Anomia reported in the setting of HA, Vertigo.  Communication functional in complex tasks.    Goals:   Multidisciplinary Problems       SLP Goals          Problem: SLP    Goal Priority Disciplines Outcome   SLP Goal     SLP    Description: 1. Pt will exhibit functional cognitive/linguistic skills.  2. Higher level testing of cognition with further goals if indicated.                        Plan:     Plan of Care reviewed with:  pt, providers  SLP Follow-Up:  No       Discharge recommendations:  home   Barriers to Discharge:  None    Time Tracking:     SLP Treatment Date:   06/05/23  Speech Start Time:  1215  Speech Stop Time:  1245     Speech Total Time  (min):  30 min    Billable Minutes: Speech Therapy Individual 15 minutes and Treatment Swallowing Dysfunction 15 minutes    06/05/2023

## 2023-06-08 ENCOUNTER — PATIENT OUTREACH (OUTPATIENT)
Dept: ADMINISTRATIVE | Facility: HOSPITAL | Age: 53
End: 2023-06-08
Payer: COMMERCIAL

## 2023-06-08 NOTE — PROGRESS NOTES
HOSPITAL FOLLOW UP: Attempting to contact patient to confirm hospital follow up appointment with Dr Reyes on 06/09/23.  Unable to reach patient at this time.

## 2023-06-09 ENCOUNTER — OFFICE VISIT (OUTPATIENT)
Dept: INTERNAL MEDICINE | Facility: CLINIC | Age: 53
End: 2023-06-09
Payer: COMMERCIAL

## 2023-06-09 ENCOUNTER — OFFICE VISIT (OUTPATIENT)
Dept: OTOLARYNGOLOGY | Facility: CLINIC | Age: 53
End: 2023-06-09
Payer: COMMERCIAL

## 2023-06-09 ENCOUNTER — CLINICAL SUPPORT (OUTPATIENT)
Dept: AUDIOLOGY | Facility: CLINIC | Age: 53
End: 2023-06-09
Payer: COMMERCIAL

## 2023-06-09 VITALS
OXYGEN SATURATION: 99 % | DIASTOLIC BLOOD PRESSURE: 72 MMHG | HEIGHT: 73 IN | WEIGHT: 242.5 LBS | SYSTOLIC BLOOD PRESSURE: 138 MMHG | RESPIRATION RATE: 16 BRPM | HEART RATE: 89 BPM | BODY MASS INDEX: 32.14 KG/M2 | TEMPERATURE: 98 F

## 2023-06-09 VITALS — HEIGHT: 73 IN | WEIGHT: 242.31 LBS | BODY MASS INDEX: 32.11 KG/M2 | TEMPERATURE: 98 F

## 2023-06-09 DIAGNOSIS — H81.12 BPPV (BENIGN PAROXYSMAL POSITIONAL VERTIGO), LEFT: ICD-10-CM

## 2023-06-09 DIAGNOSIS — R73.03 PRE-DIABETES: ICD-10-CM

## 2023-06-09 DIAGNOSIS — H81.12 BPPV (BENIGN PAROXYSMAL POSITIONAL VERTIGO), LEFT: Primary | ICD-10-CM

## 2023-06-09 DIAGNOSIS — R42 VERTIGO: Primary | ICD-10-CM

## 2023-06-09 DIAGNOSIS — E78.5 HYPERLIPIDEMIA, UNSPECIFIED HYPERLIPIDEMIA TYPE: ICD-10-CM

## 2023-06-09 PROCEDURE — 1160F RVW MEDS BY RX/DR IN RCRD: CPT | Mod: CPTII,S$GLB,, | Performed by: PEDIATRICS

## 2023-06-09 PROCEDURE — 3008F BODY MASS INDEX DOCD: CPT | Mod: CPTII,S$GLB,, | Performed by: PEDIATRICS

## 2023-06-09 PROCEDURE — 1159F PR MEDICATION LIST DOCUMENTED IN MEDICAL RECORD: ICD-10-PCS | Mod: CPTII,S$GLB,, | Performed by: PEDIATRICS

## 2023-06-09 PROCEDURE — 4010F ACE/ARB THERAPY RXD/TAKEN: CPT | Mod: CPTII,S$GLB,, | Performed by: STUDENT IN AN ORGANIZED HEALTH CARE EDUCATION/TRAINING PROGRAM

## 2023-06-09 PROCEDURE — 4010F PR ACE/ARB THEARPY RXD/TAKEN: ICD-10-PCS | Mod: CPTII,S$GLB,, | Performed by: PEDIATRICS

## 2023-06-09 PROCEDURE — 3008F BODY MASS INDEX DOCD: CPT | Mod: CPTII,S$GLB,, | Performed by: STUDENT IN AN ORGANIZED HEALTH CARE EDUCATION/TRAINING PROGRAM

## 2023-06-09 PROCEDURE — 99213 PR OFFICE/OUTPT VISIT, EST, LEVL III, 20-29 MIN: ICD-10-PCS | Mod: S$GLB,,, | Performed by: PEDIATRICS

## 2023-06-09 PROCEDURE — 3044F HG A1C LEVEL LT 7.0%: CPT | Mod: CPTII,S$GLB,, | Performed by: PEDIATRICS

## 2023-06-09 PROCEDURE — 99999 PR PBB SHADOW E&M-EST. PATIENT-LVL III: CPT | Mod: PBBFAC,,, | Performed by: STUDENT IN AN ORGANIZED HEALTH CARE EDUCATION/TRAINING PROGRAM

## 2023-06-09 PROCEDURE — 4010F PR ACE/ARB THEARPY RXD/TAKEN: ICD-10-PCS | Mod: CPTII,S$GLB,, | Performed by: STUDENT IN AN ORGANIZED HEALTH CARE EDUCATION/TRAINING PROGRAM

## 2023-06-09 PROCEDURE — 99213 OFFICE O/P EST LOW 20 MIN: CPT | Mod: S$GLB,,, | Performed by: PEDIATRICS

## 2023-06-09 PROCEDURE — 3008F PR BODY MASS INDEX (BMI) DOCUMENTED: ICD-10-PCS | Mod: CPTII,S$GLB,, | Performed by: PEDIATRICS

## 2023-06-09 PROCEDURE — 1159F MED LIST DOCD IN RCRD: CPT | Mod: CPTII,S$GLB,, | Performed by: PEDIATRICS

## 2023-06-09 PROCEDURE — 3075F SYST BP GE 130 - 139MM HG: CPT | Mod: CPTII,S$GLB,, | Performed by: PEDIATRICS

## 2023-06-09 PROCEDURE — 3078F DIAST BP <80 MM HG: CPT | Mod: CPTII,S$GLB,, | Performed by: PEDIATRICS

## 2023-06-09 PROCEDURE — 99203 PR OFFICE/OUTPT VISIT, NEW, LEVL III, 30-44 MIN: ICD-10-PCS | Mod: S$GLB,,, | Performed by: STUDENT IN AN ORGANIZED HEALTH CARE EDUCATION/TRAINING PROGRAM

## 2023-06-09 PROCEDURE — 99203 OFFICE O/P NEW LOW 30 MIN: CPT | Mod: S$GLB,,, | Performed by: STUDENT IN AN ORGANIZED HEALTH CARE EDUCATION/TRAINING PROGRAM

## 2023-06-09 PROCEDURE — 1160F PR REVIEW ALL MEDS BY PRESCRIBER/CLIN PHARMACIST DOCUMENTED: ICD-10-PCS | Mod: CPTII,S$GLB,, | Performed by: PEDIATRICS

## 2023-06-09 PROCEDURE — 99999 PR PBB SHADOW E&M-EST. PATIENT-LVL IV: CPT | Mod: PBBFAC,,, | Performed by: PEDIATRICS

## 2023-06-09 PROCEDURE — 3044F HG A1C LEVEL LT 7.0%: CPT | Mod: CPTII,S$GLB,, | Performed by: STUDENT IN AN ORGANIZED HEALTH CARE EDUCATION/TRAINING PROGRAM

## 2023-06-09 PROCEDURE — 99999 PR PBB SHADOW E&M-EST. PATIENT-LVL III: ICD-10-PCS | Mod: PBBFAC,,, | Performed by: STUDENT IN AN ORGANIZED HEALTH CARE EDUCATION/TRAINING PROGRAM

## 2023-06-09 PROCEDURE — 4010F ACE/ARB THERAPY RXD/TAKEN: CPT | Mod: CPTII,S$GLB,, | Performed by: PEDIATRICS

## 2023-06-09 PROCEDURE — 3044F PR MOST RECENT HEMOGLOBIN A1C LEVEL <7.0%: ICD-10-PCS | Mod: CPTII,S$GLB,, | Performed by: PEDIATRICS

## 2023-06-09 PROCEDURE — 99999 PR PBB SHADOW E&M-EST. PATIENT-LVL IV: ICD-10-PCS | Mod: PBBFAC,,, | Performed by: PEDIATRICS

## 2023-06-09 PROCEDURE — 3008F PR BODY MASS INDEX (BMI) DOCUMENTED: ICD-10-PCS | Mod: CPTII,S$GLB,, | Performed by: STUDENT IN AN ORGANIZED HEALTH CARE EDUCATION/TRAINING PROGRAM

## 2023-06-09 PROCEDURE — 3078F PR MOST RECENT DIASTOLIC BLOOD PRESSURE < 80 MM HG: ICD-10-PCS | Mod: CPTII,S$GLB,, | Performed by: PEDIATRICS

## 2023-06-09 PROCEDURE — 3044F PR MOST RECENT HEMOGLOBIN A1C LEVEL <7.0%: ICD-10-PCS | Mod: CPTII,S$GLB,, | Performed by: STUDENT IN AN ORGANIZED HEALTH CARE EDUCATION/TRAINING PROGRAM

## 2023-06-09 PROCEDURE — 3075F PR MOST RECENT SYSTOLIC BLOOD PRESS GE 130-139MM HG: ICD-10-PCS | Mod: CPTII,S$GLB,, | Performed by: PEDIATRICS

## 2023-06-09 NOTE — PROGRESS NOTES
Subjective     Patient ID: Sae Santiago is a 52 y.o. male.    Chief Complaint: Follow-up (Hospital )    Sae Santiago is a 52 y.o. male who presents to the clinic for a hospital visit follow up. He went to the emergency room with acute onset of vertigo. Had extensive workup. Was felt to either be vestibulitis or exposure to carburetor . Pt reports some improvement but still with sxs during turning head to the right or quick position changing. Is doing home vestibular activity. Work up reviewed with pt, has upcoming ENT appointment, has sxs when his wife drove him in the car today.           Review of Systems   Constitutional:  Negative for chills, diaphoresis, fatigue and fever.   HENT:  Negative for sore throat and trouble swallowing.    Respiratory:  Negative for cough and shortness of breath.    Cardiovascular:  Negative for chest pain.   Gastrointestinal:  Negative for abdominal pain, anal bleeding, constipation, diarrhea, nausea and vomiting.   Endocrine: Negative for cold intolerance, heat intolerance, polydipsia, polyphagia and polyuria.   Neurological:  Positive for vertigo. Negative for tremors, syncope, speech difficulty, weakness, coordination difficulties and coordination difficulties.   All other systems reviewed and are negative.       Objective     Physical Exam  Constitutional:       General: He is not in acute distress.     Appearance: Normal appearance. He is not ill-appearing or toxic-appearing.   HENT:      Right Ear: Tympanic membrane normal.      Left Ear: Tympanic membrane normal.      Nose: No congestion or rhinorrhea.      Mouth/Throat:      Pharynx: Oropharynx is clear.   Eyes:      Extraocular Movements: Extraocular movements intact.      Conjunctiva/sclera: Conjunctivae normal.      Pupils: Pupils are equal, round, and reactive to light.      Comments: upper gaze left eye nystagmus mild    Cardiovascular:      Rate and Rhythm: Normal rate and regular rhythm.       Pulses: Normal pulses.      Heart sounds: Normal heart sounds. No murmur heard.    No friction rub. No gallop.   Pulmonary:      Effort: Pulmonary effort is normal.      Breath sounds: Normal breath sounds.   Abdominal:      General: There is no distension.      Palpations: There is no mass.      Tenderness: There is no abdominal tenderness. There is no guarding or rebound.      Hernia: No hernia is present.   Neurological:      Mental Status: He is alert and oriented to person, place, and time.      Cranial Nerves: No cranial nerve deficit.      Motor: No weakness.      Coordination: Coordination normal.      Gait: Gait normal.   Psychiatric:         Mood and Affect: Mood normal.         Behavior: Behavior normal.         Thought Content: Thought content normal.         Judgment: Judgment normal.          Assessment and Plan     1. Vertigo    2. Hyperlipidemia, unspecified hyperlipidemia type  -     Lipid Panel; Future; Expected date: 06/09/2023  -     Comprehensive Metabolic Panel; Future; Expected date: 06/09/2023    3. Pre-diabetes  -     Hemoglobin A1C; Future; Expected date: 06/09/2023        Suspect labyrinthitis, fall/safety precautions discussed with pt, see ENT as arranged. Off work until seen by ENT. Follow up in 3 months for continued care.          Follow up in about 3 months (around 9/9/2023).

## 2023-06-09 NOTE — LETTER
June 9, 2023      The [address]  [address] [facility]  [place] [address]  Phone: [phone]  Fax: [phone]       Patient: Sae Santiago   YOB: 1970  Date of Visit: 06/09/2023    To Whom It May Concern:    Robin Santiago  was at [facility] on 06/09/2023. The patient may return to work/school after further evaluation with ENT, appointment  is scheduled for 06/12/2023 . If you have any questions or concerns, or if I can be of further assistance, please do not hesitate to contact me.    Sincerely,    Laura Nagy MA

## 2023-06-09 NOTE — PROGRESS NOTES
Sae Luisitomars Houstongeorge was seen 06/09/2023 for BPPV testing and Epley maneuver per Dr. Arguelles.     Right Linwood-Hallpike: Negative for BPPV; slight ageotropic nystagmus in supine  Left Linwood-Hallpike: Postive for BPPV    Patient was counseled on the above findings. A 5-position Epley maneuver was completed to the left.  Patient tolerated the maneuver well and was asymptomatic upon discharge.  Post Epley instructions were given and reviewed with the patient.  Understanding was voiced.    Recommendations:  ENT Review.   Return in one week for re-check, as scheduled.

## 2023-06-09 NOTE — PROGRESS NOTES
Chief complaint:   Chief Complaint   Patient presents with    Dizziness     10:30am Saturday sudden onset of dizziness and vomiting after using carburetor spray and continued after stopping use. Sae Santiago went to ER and had some testings done but continues with dizziness today even after researching and self performing crystal alignment        Referring Provider:  [place]  [address] [place] [address]    History of Present Illness:     Mr. Santiago is a 52 y.o. male presenting for evaluation of dizziness.     Onset: 6 days ago, started when spraying carburetor spray, lasted a few seconds initially, then second time a few minutes later was more intense room spinning,   Frequency of episodes: since initial event has been better, but not resolved   Duration of individual episodes:seconds  Exacerbating factors: turning head to the left  Prior Medications: meclizine (Antivert) a few times without improvement, last 24 hours ago  Also tried repositioning maneuvers at home  Relieving factors:  remaining motionless  Quality: intense room spinning   Prior history of similar events: No    Associated signs and symptoms:    [] Hearing loss  [] Ear fullness  [] Tinnitus  [] Headache  [] Light sensitivity  [] Sound sensitivity  [x] Nausea   [x] Vomiting  [] Dizziness with valsalva or weather change  [] Autophony    The patient denies significant hearing loss risk factors, ototoxic or vestibulotoxic medication exposure, chronic vestibular suppressant use, head/ facial/ venkat trauma, and otologic surgery.          History        Past Medical History:   Past Medical History:   Diagnosis Date    Anxiety     GERD (gastroesophageal reflux disease)     Hyperlipidemia     Hypertension     .          Past Surgical History:  Past Surgical History:   Procedure Laterality Date    ANTERIOR CERVICAL DISCECTOMY W/ FUSION Bilateral 1/9/2020    Procedure: DISCECTOMY, SPINE, CERVICAL, ANTERIOR APPROACH, WITH  FUSION  ;  Surgeon: Ari Grossman MD;  Location: [facility] OR;  Service: Neurosurgery;  Laterality: Bilateral;    COLONOSCOPY N/A 12/7/2021    Procedure: COLONOSCOPY;  Surgeon: Nirmala Mehta MD;  Location: [facility] ENDO;  Service: Endoscopy;  Laterality: N/A;    EPIDURAL STEROID INJECTION INTO CERVICAL SPINE N/A 12/13/2019    Procedure: C7/T1 IL MOE;  Surgeon: Parveen Goldstein MD;  Location: [facility] PAIN MGT;  Service: Pain Management;  Laterality: N/A;    KNEE SURGERY Left     SURGICAL REMOVAL OF PILONIDAL CYST      SURGICAL REMOVAL OF VERTEBRAL BODY OF CERVICAL SPINE  1/9/2020    Procedure: CORPECTOMY, SPINE, CERVICAL;  Surgeon: Ari Grossman MD;  Location: [facility] OR;  Service: Neurosurgery;;    TONSILLECTOMY     .         Medications: Medication list was reviewed. He  has a current medication list which includes the following prescription(s): buspirone, fluconazole, hydrochlorothiazide, losartan, meclizine, metoprolol succinate, multivitamin, and rosuvastatin.         Allergies:   Review of patient's allergies indicates:   Allergen Reactions    Lexapro [escitalopram oxalate]      Serotonin Syndrome ( Pt was seen in the emergency department)    Sulfa (sulfonamide antibiotics)      Room spinning with cold sweats    Sulfamethoxazole-trimethoprim             Family history: family history includes Hypertension in his father.         Social History          Alcohol use:  reports no history of alcohol use.            Tobacco:  reports that he has never smoked. He has never used smokeless tobacco.         Please see the patient's intake form for full details of past medical history, past surgical history, family history, social history and review of systems. ?This information was reviewed by me and noted.      Physical Examination     Vitals:    06/09/23 0818   Temp: 98.1 °F (36.7 °C)        General: Well developed, well nourished, well hydrated. Verbal with a strong voice and not dysphonic.     Head/Face: Normocephalic,  atraumatic. No scars or lesions. Facial musculature equal.     Eyes: No scleral icterus or conjunctival hemorrhage. EOMI. PERRLA.     Ears:     Right ear: No gross deformity. EAC is clear of debris and erythema. The TM is intact with a pneumatized middle ear. No signs of retraction, fluid or infection.      Left ear: No gross deformity. EAC is clear of debris and erythema. The TM is intact with a pneumatized middle ear. No signs of retraction, fluid or infection.     Neurologic: Moving all extremities without gross abnormality.CN II-XII grossly intact. House-Brackmann 1/6.     Motor strength:  Strength 5/5 throughout.    Sensation:  Sensory function to light touch and proprioception intact throughout.    Gait:  No ataxia and can tandem gait 6 steps.    [place] Hallpike - torsional nystagmus to the left        Data review      Imaging    I have independently reviewed the following imaging with the findings noted below:      MRA brain  Impression:     Unremarkable MRA brain.       MRI brain   Impression:     Normal MR brain.       CT head  Impression:     Normal head CT.           Assessment     No diagnosis found.      Plan:      Dizziness is an extremely complex problem that may arise from many sources.  I requires the coordination between the visual system, the vestibular system as well as the proprioreceptive system.  Additionally, balance is compromised in the setting of musculoskeletal, cerebral, cardiac, and numerous physiologic disorders.  The complex interplay between these systems may also lead to dizziness if there is dysynchrony between the bilateral vestibular symptoms.    Central vestibular symptoms can generally be distinguished from peripheral vestibulopathies by the presence of other non vestibular neurologic symptoms (focal weakness, headache), light headedness (rather than true vertigo), near syncope, weak limbs, panic, fuzziness/cloudiness in mentation, and clumsiness.  Peripheral vestibulopathies are  generally, at some point during their course, characterized by a true vertiginous sensation of movement, difficulty with sudden head movements, nausea, difficulty with sudden head movement, and possibly oscicllopsia.    BPPV is the most common cause of episodic vertigo.  Symptoms generally last for seconds then resolve when motionless, otitic symptoms are absent and may be provoked with sudden head motion.  This may occur spontaneously, but frequently follow head trauma or recent vestibular neuronitis.  Nystagmus is typically geotropic and directed toward the affected ear (hyperactive input to the inferior vestibular nerve via the Singular nerve). Canalith repositioning maneuvers are the method of choice for treating this condition.  Mild imbalance following is common and may last 1-2 weeks.    Vestibular neuronitis and labyrinthitis can be distinguished by the presence of sensorineural hearing loss in labyrinthitis, but during their active phase, vertigo is constant.   MRI may be necessary during this phase to exclude CNS pathology.  Frequently this is preceded by a viral illness. Both result in permanent partial end organ weakness of the affected vestibular nerve (usually superior).  Otherwise, they are essentially the same.  The early (vertiginous, 1-3 days) phase is characterized by acute onset of vertigo that is essentially constant and present even in the absence of motion.  Nystagmus is directed away from the affected ear (hypoactive).  In the acute phase, steroids, limited use of vestibular suppressants and hydration are the most effective treatment. Patients then enter the second phase of uncompensated vestibulopathy where they have a general sense of imbalance.  The duration of this phase depends on several factors, but is generally delayed in the presence of vestibular suppressants, advanced age, central/systemic balance issues and sessile behavior.   Phase 3 is compensated vestibulopathy where symptoms  generally only occur with sudden head movements and can be seen with catch up saccades on head thrust.   However, in the presence of bilateral VN, oscillopsia is the hallmark of the disease and compensation is frequently very delayed and poor.    Other causes of vertigo that are typically constant are vestibulotoxic medications and autoimmune inner ear disease and these are generally bilateral in nature.    Meniere's disease is typically (85%) unilateral and defined by 2 spontaneous vertigo attacks lasting 20 min or more, documented hearing loss (typically low tone, frequently fluctuating) and otic fullness or tinnitus in the affected ear. However, the presence of endolymphatic hydrops may result in similar symptoms, not meeting these strict criteria.  This disease can be difficult to distinguish from vestibular migraines, which are not always associated with headaches.    Superior canal dehiscence presents with episodic vertigo lasting seconds to minutes, aural fullness, autophony, hyperacusis and tinnitus (flaca pulsatile).  Aural pressure is the most common presenting symptom.  Symptoms can be provoked with Valsalva.  Bone conduction thresholds are supranormal.    Perilymph fistula presents with fluctuating hearing loss, episodic vertigo lasting seconds to minutes and frequently is associated with prior barotrauma or otologic surgery.  Conservative measures such as stool softeners and bedrest frequently lead to resolution.  In cases of persistent symptoms, unfortunately there is no noninvasive diagnostic test with high specificity, and surgical exploration is sometimes necessary when this is expected.    Vestibular schwannomas may have constant or episodic vertigo, frequently SNHL and sometimes may be accompanied by headache or facial numbness.    The diagnosis and options of management were discussed at length with the patient, including hearing, balance function and the risks associated with my recommendations.  We spent a considerable amount of time discussing strategies to cope with dizziness. I emphasized the great importance of fall avoidance and activities that may be dangerous if Sae has an episode of dizziness.  I answered all questions in layman's terms. Based on the history, physical exam and imaging studies there is significant evidence of a vestibular dysfunction - possible left BPPV..  I recommend epley, and follow up next week. If not resolved, consider VNG.        Allen Arguelles MD  [facility]   [facility] - [address]  [place] [address]  P: [phone]  F: [phone]

## 2023-06-13 ENCOUNTER — CLINICAL SUPPORT (OUTPATIENT)
Dept: REHABILITATION | Facility: HOSPITAL | Age: 53
End: 2023-06-13
Payer: COMMERCIAL

## 2023-06-13 DIAGNOSIS — R42 VERTIGO: ICD-10-CM

## 2023-06-13 PROCEDURE — 97162 PT EVAL MOD COMPLEX 30 MIN: CPT | Performed by: PHYSICAL THERAPIST

## 2023-06-13 PROCEDURE — 97112 NEUROMUSCULAR REEDUCATION: CPT | Performed by: PHYSICAL THERAPIST

## 2023-06-13 PROCEDURE — 97535 SELF CARE MNGMENT TRAINING: CPT | Performed by: PHYSICAL THERAPIST

## 2023-06-13 NOTE — PLAN OF CARE
"[facility]  Physical Therapy Initial Evaluation    Name: Sae Jorge [facility] Number: [id number]    Therapy Diagnosis:    Encounter Diagnosis   Name Primary?    Vertigo       Physician: Claudette Feliz NP     Physician Orders: PT Eval and Treat  Medical Diagnosis from Referral: Vertigo  Evaluation Date: 6/13/2023  Authorization Period Expiration: 6/4/2024  Plan of Care Expiration: 9/11/2023  Visit # / Visits authorized: 1/1  FOTO: 1/3    Progress Note Due on 7/13/2023    Precautions: Standard    Time In: 1605  Time Out: 1650  Total Billable Time (timed & untimed codes): 45 minutes    Subjective     Date of onset: just over a week ago on 6/3/2023  History of current condition - Sae is a 52 y.o. male whom reports on 6/3/2023, he was working on his car (looking down), and he started to notice some dizziness. He took a break, but then the symptoms came back fast. Patient states that everything was spinning so bad, he couldn't even look at his phone to dial for help initially. Patient states that he also started vomiting. He was taken to the ER, where images were taken (MRI, CT, etc.) and were unremarkable. These intense s/s lasted for about 8 hours, but then he was still left feeling off balance, and required use of a walker initially when attempting to walk around. Patient reports that he was doing some self treatment at home, including the Epley's maneuver. They repeated this maneuver at then ENT, but he is unsure if it helped. Patient reports that the spinning has mostly stopped, but he still feels "off" now. He notices some symptoms with moving his head, quick turns, and driving. Sometimes he still feels dizzy when turning over in bed, but it is not consistent.         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   Prior Level of Function: Independent and " dizziness free with all ADL, IADL, community mobility and functional activities.   Current Level of Function: Independent with all ADL, IADL, community mobility and functional activities with reports of increased dizziness and need for increased time and frequent breaks.      Dominant Extremity: right    Pts goals: Pt reported goals are to decrease overall dizziness in order to return to prior functional level.       Medical History:   Past Medical History:   Diagnosis Date    Anxiety     GERD (gastroesophageal reflux disease)     Hyperlipidemia     Hypertension        Surgical History:   Sae Santiago  has a past surgical history that includes Knee surgery (Left); Epidural steroid injection into cervical spine (N/A, 12/13/2019); Tonsillectomy; Surgical removal of pilonidal cyst; Anterior cervical discectomy w/ fusion (Bilateral, 1/9/2020); Surgical removal of vertebral body of cervical spine (1/9/2020); and Colonoscopy (N/A, 12/7/2021).    Medications:   Sae has a current medication list which includes the following prescription(s): buspirone, fluconazole, hydrochlorothiazide, losartan, meclizine, metoprolol succinate, multivitamin, and rosuvastatin.    Allergies:   Review of patient's allergies indicates:   Allergen Reactions    Lexapro [escitalopram oxalate]      Serotonin Syndrome ( Pt was seen in the emergency department)    Sulfa (sulfonamide antibiotics)      Room spinning with cold sweats    Sulfamethoxazole-trimethoprim           Objective   - Follows commands: 100% of time   - Speech: no deficits       Mental status: alert, oriented to person, place, and time, normal mood, behavior, speech, dress, motor activity, and thought processes  Appearance: Casually dressed and Well groomed  Behavior:  calm, cooperative, and adequate rapport can be established  Attention Span and Concentration:  Normal      Posture: Pt presents with postural abnormalities which include: forward head and rounded shoulders             Visual/Auditory:   Tracking/Smooth Pursuits:Impaired: very minimal nystagmus noted with tracking vertically and horizontally   Gaze Stabilization: WNL  Head Shake: NT this visit - next  Head Thrust: NT this visit - next  Saccades: Impaired: unable to keep pace, moderate nystagmus note with increased speed  Convergence: >10 cm  VOR: Impaired: unable to keep focus, increases dizziness  VCR: NT this visit - next (head remains still, body moves)      Coordination:   - fine motor: WNL  - UE coordination: WNL    - LE coordination:  WNL      POSITIONAL CANAL TESTING - to be assessed next visit. Pt will be following up with audiology to retest on Friday.   Looking for nystagmus (slow drift to affected side with quick correction away)    [place] Hallpike (posterior / CL anterior)   Right : NT   Left: NT  Horizontal Canals   Right: NT   Left: NT      MUSCLE LENGTH:     Muscle Tested  Right  6/13/2023 Left   6/13/2023 Goal   Upper Trapezius  decreased decreased Normal B    Levator Scapulae  decreased decreased Normal B   Sternocleidomastoid decreased decreased Normal B   Scalenes  decreased decreased Normal B    Pectoralis Minor  decreased decreased Normal B       Gait Analysis: The patient ambulated with the following assistive device: none; the pt presents with the following gait abnormalities: bradykinetic, increased LIANE, and decreased step length bilateral      Function:    CMS Impairment/Limitation/Restriction for FOTO Vestibular Survey    Therapist reviewed FOTO scores for Sae Santiago on 6/13/2023.   FOTO documents entered into [email or web address] - see Media section.    Limitation Score: 48%         OBJECTIVE / TREATMENT   Total Treatment time separate from Evaluation: (25) minutes      Sae participated in neuromuscular re-education activities to improve: saccades, smooth pursuit, VOR for (15)  minutes., including:    Intervention Performed Today    HEP, including  Smooth pursuits  Saccades  VOR x 1 x See Patient  "Instructions for details                                        Plan for Next Visit:        Home Exercises and Patient Education Provided    Education/Self-Care provided: (10 minutes)  Patient educated on the impairments noted above and the effects of physical therapy intervention to improve overall condition and QOL.   Patient was educated on all the above exercise prior/during/after for proper posture, positioning, and execution for safe performance with home exercise program.   Education on different possible causes of dizziness, how symptoms typically present for each, and different treatment options  Discussed habituation, adaptation, etc. and how this is used functionally to help treat "off" sensation.   Discussed plan for patient to start with saccades, smooth pursuit, and VOR, but that he will also be assessed for BPPV next visit. He follows up with audiology on Friday and will let us know how this goes next visit.       Written Home Exercises Provided: yes.  Exercises were reviewed and Sae was able to demonstrate them prior to the end of the session.  Sae demonstrated good  understanding of the education provided.     See EMR under Patient Instructions for exercises provided 6/13/2023.      Assessment     Sae is a 52 y.o. male referred to outpatient Physical Therapy with a medical diagnosis of vertigo. Pt presents with impairments including: impairments list: muscle length, posture, gait mechanics, core strength and stability, functional movement patterns, and saccades, smooth pursuit, convergence, VOR .    Pt prognosis is Good.   Pt will benefit from skilled outpatient Physical Therapy to address the deficits stated above and in the chart below, provide pt/family education, and to maximize pt's level of independence.     Plan of care discussed with patient: Yes  Pt's spiritual, cultural and educational needs considered and patient is agreeable to the plan of care and goals as stated below: "     Anticipated Barriers for therapy: co-morbidities and lack of understanding of condition    Medical Necessity is demonstrated by the following   History  Co-morbidities and personal factors that may impact the plan of care [] LOW: no personal factors / co-morbidities  [x] MODERATE: 1-2 personal factors / co-morbidities  [] HIGH: 3+ personal factors / co-morbidities    Moderate / High Support Documentation:   Past Medical History:   Diagnosis Date    Anxiety     GERD (gastroesophageal reflux disease)     Hyperlipidemia     Hypertension          Examination  Body Structures and Functions, activity limitations and participation restrictions that may impact the plan of care [] LOW: addressing 1-2 elements  [x] MODERATE: 3+ elements  [] HIGH: 4+ elements (please support below)    Moderate / High Support Documentation: See evaluation / objective measurements above.     Clinical Presentation [] LOW: stable  [x] MODERATE: Evolving  [] HIGH: Unstable     Decision Making/ Complexity Score: moderate         GOALS:    Short Term Goals:  6 weeks Progress   Dizziness: Patient will report decreased dizziness, and recent s/s trend is improving in order to progress towards LTG's. PC   Function: Patient will demonstrate improved function as indicated by a functional status score of less than or equal to 43 out of 100 on FOTO. PC   Gait: Patient will demonstrate improved gait mechanics in order to improve functional mobility, improve quality of life, and decrease risk of further injury or fall.  PC   HEP: Patient will demonstrate independence with HEP in order to progress toward functional independence. PC   Improve postural awareness.  PC       Long Term Goals:  12 weeks Progress   Dizziness:  Patient will return to normal ADL, recreational, and work related activities with less dizziness and limitation.  PC   Function: Patient will demonstrate improved function as indicated by a functional status score of less than or equal to 37  out of 100 on FOTO. PC   Gait: Patient will demonstrate normalized gait mechanics with minimal compensation in order to return to PLOF. PC   Decrease saccades as well as positional and VOR related s/s in order for patient to be able to switch positions or look quickly to different directions without onset of dizziness.  PC     Goals Key:  PC= progressing/continue; PM= partially met;        DC= discontinue    Plan     Plan of care Certification: 6/13/2023 to 9/11/2023.    Outpatient Physical Therapy 2 times weekly for 12 weeks to include any combination of the following interventions: vestibular habituation exercises, dry needling, modalities, electrical stimulation (IFC, Pre-Mod, Attended with Functional Dry Needling), Cervical/Lumbar Traction, Gait Training, Manual Therapy, Neuromuscular Re-ed, Patient Education, Self Care, Therapeutic Exercise, and Therapeutic Activites     Thank you for this referral.    These services are reasonable and necessary for the conditions set forth above while under my care.    Karina Tucker, PT, DPT

## 2023-06-13 NOTE — PATIENT INSTRUCTIONS
SACCADES    Purpose of Activity: This activity will help you to tolerate quick eye movements such as those needed for reading.     Sit in a comfortable position, hold a playing cared (raisa or queen) in each hand at about eye level and at a comfortable distance.     Keep your head still, move your eyes quickly from one card to the other without stopping in between cards. Do not go so quickly that you blur the targets. Remember to only move your eyes and not move your head.    At first, use a larger target. As you improve, try to focus on progressively smaller details of the face card such as the nose, eye, or mouth of each card. As you improve, try to move your eyes more quickly.    Repetitions:     Repeat 30 to 40 times in the horizontal direction or time yourself for approximately 20-30 seconds.   Repeat 30 to 40 times in the vertical direction or time yourself for approximately 20-30 seconds.  Repeat 30 to 40 times in both diagonal directions (right/up to left/down; right/down to left/up) or time yourself for approximately 20-30 seconds.     Perform at least once per day. Start per your tolerance and work your way up to 20-30 seconds.                       Visual Smooth Pursuit: tracking thumb    Follow a slow moving target such as your thumbnail with your eyes. Keep your head and body still, and move your thumb side to side, following with your eyes, and keeping your head still. You can use your thumb as the target, or have someone else move a target back and forth for you.    Goal is to perform for 20-30 seconds: (however, start per tolerance)    1)moving the target horizontally    2) vertically    3) diagonally each way    If you feel dizziness, rest until symptoms resolve, and repeat. work up to tolerance                VOR x 1 Horizontal:    Hold a card with a letter on it in front of you. Focus on the letter. Turn your head left and right, keeping your eyes focused on the letter. Attempt to turn your head  left/right as quickly as possible WHILE keeping the letter in focus. If the letter becomes blurry, slow down    Goal is to perform for 20-30 seconds: (however, start per tolerance)  Perform at least once per day        VOR x 1 Vertical:    Hold a card with a letter on it in front of you. Focus on the letter. Tilt your head up and down, keeping your eyes focused on the letter. Attempt to tilt your head up/down as quickly as possible WHILE keeping the letter in focus. If the letter becomes blurry, slow down    Goal is to perform for 20-30 seconds: (however, start per tolerance)  Perform at least once per day

## 2023-06-16 ENCOUNTER — CLINICAL SUPPORT (OUTPATIENT)
Dept: AUDIOLOGY | Facility: CLINIC | Age: 53
End: 2023-06-16
Payer: COMMERCIAL

## 2023-06-16 DIAGNOSIS — H81.12 BPPV (BENIGN PAROXYSMAL POSITIONAL VERTIGO), LEFT: Primary | ICD-10-CM

## 2023-06-16 PROCEDURE — 99999 PR PBB SHADOW E&M-EST. PATIENT-LVL I: ICD-10-PCS | Mod: PBBFAC,,,

## 2023-06-16 PROCEDURE — 99999 PR PBB SHADOW E&M-EST. PATIENT-LVL I: CPT | Mod: PBBFAC,,,

## 2023-06-16 NOTE — PROGRESS NOTES
Sae Santiago was seen 06/16/2023 for BPPV testing. Patient reported continued dizziness since previous visit. Today was his first day driving. Per report, he has been taking meclizine. He was evaluated by physical therapy earlier this week.     Right [place]-Hallpike: Negative for BPPV; slight ageotropic nystagmus in supine  Left [place]-Hallpike: Negative for BPPV    Patient was counseled on the above findings.    Recommendations:  ENT Review.   Return for VNG testing, as scheduled.

## 2023-06-20 ENCOUNTER — CLINICAL SUPPORT (OUTPATIENT)
Dept: REHABILITATION | Facility: HOSPITAL | Age: 53
End: 2023-06-20
Payer: COMMERCIAL

## 2023-06-20 DIAGNOSIS — R42 VERTIGO: Primary | ICD-10-CM

## 2023-06-20 PROCEDURE — 97140 MANUAL THERAPY 1/> REGIONS: CPT | Performed by: PHYSICAL THERAPIST

## 2023-06-20 PROCEDURE — 97112 NEUROMUSCULAR REEDUCATION: CPT | Performed by: PHYSICAL THERAPIST

## 2023-06-20 PROCEDURE — 97530 THERAPEUTIC ACTIVITIES: CPT | Performed by: PHYSICAL THERAPIST

## 2023-06-20 NOTE — PROGRESS NOTES
"[facility]   Physical Therapy Treatment Note        Name: [facility] Number: [id number]    Therapy Diagnosis:   Encounter Diagnosis   Name Primary?    Vertigo Yes     Physician: Claudette Feliz NP    Visit Date: 6/20/2023    Physician Orders: PT Eval and Treat  Medical Diagnosis from Referral: Vertigo  Evaluation Date: 6/13/2023  Authorization Period Expiration: 12/31/2023  Plan of Care Expiration: 9/11/2023  Visit # / Visits authorized: 1/20  FOTO: 1/3     Progress Note Due on 7/13/2023     Precautions: Standard    PTA Visit #: 0/5     Time In: 1607  Time Out: 1702  Total Billable Time: 53 minutes (Billing reflects 1 on 1 treatment time spent with patient)    Subjective     Patient reports: that he has been feeling a little better. He has not had any spinning sensations, and the "off/wavy" feeling is getting better as well. He has been driving the past two days, and he has been feeling pretty good. Patient reports his head just feels "less foggy" in general. He is working but not quite full capacity. He has not been taking his medication.     He/She was compliant with home exercise program.  Response to previous treatment: no adverse reaction  Functional change: improved s/s    Pain: N/A    Objective      Objective Measures updated at progress report or POC update only unless otherwise noted.       Treatment     Sae received the treatments listed below:       MANUAL THERAPY TECHNIQUES were applied for (8) minutes, including:    Manual Intervention Performed Today    Soft Tissue Mobilization [x] bilateral suboccipitals, paraspinals, upper trapezius, levator scapulae     Joint Mobilizations []     []     []    Functional Dry Needling  []      Plan for Next Visit: Continue as needed           NEUROMUSCULAR RE-EDUCATION ACTIVITIES to improve Balance, Coordination, Kinesthetic, Sense, Proprioception, and Posture for (30) minutes.  The following were included:    Intervention " "Performed Today    Star[place] Hallpike Testing x Negative B   Horizontal Canal Testing x Negative B   Cover/Uncover Test x Negative   Smooth Pursuits x 30" each direction  Horizontal - minimal nystagmus with no s/s  Vertical - no nystagmus noted and no s/s   Saccades x 30" each direction  No nystagmus noted, improved speed   VOR x 1 x 30" each direction  No nystagmus noted, improved speed                              Plan for Next Visit:          THERAPEUTIC ACTIVITIES to improve dynamic and functional  performance for (15) minutes including:    Intervention Performed Today    Standing rotations x Walking down II bars and back ~3x each direction (R>L)   Standing pivots x 180 pivots/rotations, ~3x to each side (R>L)   Standing Head turns with feet together on solid surface x 30"   Standing head turns on airex foam x 30"   DLS on airex foam with eyes closed x 30"   Walking on turf with head turns x Down and back 2x along turf in gym                Plan for Next Visit:          Patient Education and Home Exercises       Home Exercises Provided and Patient Education Provided     Education provided:  PURPOSE: Patient educated on the impairments noted above and the effects of physical therapy intervention to improve overall condition and QOL.   EXERCISE: Patient was educated on all the above exercise prior/during/after for proper posture, positioning, and execution for safe performance with home exercise program.   POSTURE: Patient educated on postural awareness to reduce stress and maintain optimal alignment of the spine with static positions and dynamic movement     Written Home Exercises Provided: yes.  Exercises were reviewed and Sae was able to demonstrate them prior to the end of the session.  Sae demonstrated good  understanding of the education provided. See EMR under Patient Instructions for exercises provided during therapy sessions.    Assessment     Patient tolerated treatment very well. He demonstrates much " "improved tolerance and decreased s/s with smooth pursuits, saccades, and VOR. Negative positional testing this visit, and patient did not report any s/s with testing. Mild postural sway noted with eyes closed on airex pad but no increase in s/s. Mild increase in "off sensation" with mild LOB (self corrected) during rotations and pivots. Patient demonstrates slight LOB twice during walking with head turns as well, but he was able to self correct with stepping strategy. Discussed incorporating rotations and walking with head turns in HEP in a safe [place] way of area where he can hold on if need. He expressed understanding.     Sae is progressing well towards his goals.   Patient prognosis is Good.     Patient will continue to benefit from skilled outpatient physical therapy to address the deficits listed in the problem list box on initial evaluation, provide pt/family education and to maximize patient's level of independence in the home and community environment.     Patient's spiritual, cultural and educational needs considered and pt agreeable to plan of care and goals.     Anticipated Barriers for therapy: co-morbidities and lack of understanding of condition    GOALS:     Short Term Goals:  6 weeks Progress   Dizziness: Patient will report decreased dizziness, and recent s/s trend is improving in order to progress towards LTG's. PC   Function: Patient will demonstrate improved function as indicated by a functional status score of less than or equal to 43 out of 100 on FOTO. PC   Gait: Patient will demonstrate improved gait mechanics in order to improve functional mobility, improve quality of life, and decrease risk of further injury or fall.  PC   HEP: Patient will demonstrate independence with HEP in order to progress toward functional independence. PC   Improve postural awareness.  PC         Long Term Goals:  12 weeks Progress   Dizziness:  Patient will return to normal ADL, recreational, and work related " activities with less dizziness and limitation.  PC   Function: Patient will demonstrate improved function as indicated by a functional status score of less than or equal to 37 out of 100 on FOTO. PC   Gait: Patient will demonstrate normalized gait mechanics with minimal compensation in order to return to PLOF. PC   Decrease saccades as well as positional and VOR related s/s in order for patient to be able to switch positions or look quickly to different directions without onset of dizziness.  PC      Goals Key:  PC= progressing/continue;  PM= partially met;             DC= discontinue     Plan     Continue Plan of Care (POC) and progress per patient tolerance. See treatment section for details on planned progressions next session.    6/13/2023 (evaluation): Outpatient Physical Therapy 2 times weekly for 12 weeks to include any combination of the following interventions: vestibular habituation exercises, dry needling, modalities, electrical stimulation (IFC, Pre-Mod, Attended with Functional Dry Needling), Cervical/Lumbar Traction, Gait Training, Manual Therapy, Neuromuscular Re-ed, Patient Education, Self Care, Therapeutic Exercise, and Therapeutic Activites       Karina Tucker, PT

## 2023-06-27 ENCOUNTER — CLINICAL SUPPORT (OUTPATIENT)
Dept: REHABILITATION | Facility: HOSPITAL | Age: 53
End: 2023-06-27
Payer: COMMERCIAL

## 2023-06-27 DIAGNOSIS — R42 VERTIGO: Primary | ICD-10-CM

## 2023-06-27 PROCEDURE — 97112 NEUROMUSCULAR REEDUCATION: CPT | Performed by: PHYSICAL THERAPIST

## 2023-06-27 NOTE — PROGRESS NOTES
"[facility]   Physical Therapy Treatment Note        Name: Sae [facility] Number: [id number]    Therapy Diagnosis:   Encounter Diagnosis   Name Primary?    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 The ride was about 45 minutes, and part of it was very bumpy. Since then he has not been feeling well. He had a headache yesterday, and a very bad one today. Patient reports that he feels better when his headache goes away. He does not feel any spinning, but just feels "off". He also feels very low energy each day and does not have his normal appetite. Patient reports that they had another delivery for work this morning. He was only outside in the heat for about 15 minutes, and he felt like he was going to pass out. He waited about 10-15 minutes and then felt better. Patient reports that he has been watching his diet and eating less salt, as well as lessening caffeine intake. He reports staying hydrated throughout his day. Patient reports that in combination with watching what he eats, he just has not had his normal appetite and has lost about 10lbs in the past 3 weeks. PT advised patient to let MD know these s/s.     He/She was compliant with home exercise program.  Response to previous treatment: no adverse reaction  Functional change: improved s/s    Pain: N/A    Objective      Objective Measures updated at progress report or POC " "update only unless otherwise noted.       Treatment     Sae received the treatments listed below:       MANUAL THERAPY TECHNIQUES were applied for (0) minutes, including:    Manual Intervention Performed Today    Soft Tissue Mobilization [] bilateral suboccipitals, paraspinals, upper trapezius, levator scapulae     Joint Mobilizations []     []     []    Functional Dry Needling  []      Plan for Next Visit: Continue as needed           NEUROMUSCULAR RE-EDUCATION ACTIVITIES to improve Balance, Coordination, Kinesthetic, Sense, Proprioception, and Posture for (30) minutes.  The following were included:    Intervention Performed Today    Starla Hallpike Testing x Negative B   Horizontal Canal Testing x Negative B   Cover/Uncover Test x Positive   Smooth Pursuits x Assessment only today - 6/27/23  30" each direction  Horizontal - minimal nystagmus with no s/s  Vertical - no nystagmus noted and no s/s   Saccades x Assessment only today - 6/27/23  30" each direction  No nystagmus noted, improved speed   VOR x 1 x Assessment only today - 6/27/23  30" each direction  No nystagmus noted, improved speed   Patient education x RE-educated patient on different types of vertigo, related s/s to each, treatment options, prognosis, importance of habituation and how it will not be immediate improvement but take time.                          Plan for Next Visit:          THERAPEUTIC ACTIVITIES to improve dynamic and functional  performance for (0) minutes including:    Intervention Performed Today    Standing rotations  Walking down II bars and back ~3x each direction (R>L)   Standing pivots  180 pivots/rotations, ~3x to each side (R>L)   Standing Head turns with feet together on solid surface  30"   Standing head turns on airex foam  30"   DLS on airex foam with eyes closed  30"   Walking on turf with head turns  Down and back 2x along turf in gym                Plan for Next Visit:          Patient Education and Home Exercises   "     Home Exercises Provided and Patient Education Provided     Education provided:  PURPOSE: Patient educated on the impairments noted above and the effects of physical therapy intervention to improve overall condition and QOL.   EXERCISE: Patient was educated on all the above exercise prior/during/after for proper posture, positioning, and execution for safe performance with home exercise program.   POSTURE: Patient educated on postural awareness to reduce stress and maintain optimal alignment of the spine with static positions and dynamic movement     Written Home Exercises Provided: yes.  Exercises were reviewed and Sae was able to demonstrate them prior to the end of the session.  Sae demonstrated good  understanding of the education provided. See EMR under Patient Instructions for exercises provided during therapy sessions.    Assessment     Patient did well with treatment today, although all standing activities were held due to headache and symptom severity today. Re-assessed positional vertigo testing today, which were negative. Positive Cover/Uncover test noted this visit. Extensive education again provided on types of vertigo, related s/s, treatment options for each, prognosis for each, and importance of consistent habituation. Advised patient to reach out to MD regarding recent increase in anxiety as well. Patient expressed understanding of all discussed.     Sae is progressing well towards his goals.   Patient prognosis is Good.     Patient will continue to benefit from skilled outpatient physical therapy to address the deficits listed in the problem list box on initial evaluation, provide pt/family education and to maximize patient's level of independence in the home and community environment.     Patient's spiritual, cultural and educational needs considered and pt agreeable to plan of care and goals.     Anticipated Barriers for therapy: co-morbidities and lack of understanding of  condition    GOALS:     Short Term Goals:  6 weeks Progress   Dizziness: Patient will report decreased dizziness, and recent s/s trend is improving in order to progress towards LTG's. PC   Function: Patient will demonstrate improved function as indicated by a functional status score of less than or equal to 43 out of 100 on FOTO. PC   Gait: Patient will demonstrate improved gait mechanics in order to improve functional mobility, improve quality of life, and decrease risk of further injury or fall.  PC   HEP: Patient will demonstrate independence with HEP in order to progress toward functional independence. PC   Improve postural awareness.  PC         Long Term Goals:  12 weeks Progress   Dizziness:  Patient will return to normal ADL, recreational, and work related activities with less dizziness and limitation.  PC   Function: Patient will demonstrate improved function as indicated by a functional status score of less than or equal to 37 out of 100 on FOTO. PC   Gait: Patient will demonstrate normalized gait mechanics with minimal compensation in order to return to PLOF. PC   Decrease saccades as well as positional and VOR related s/s in order for patient to be able to switch positions or look quickly to different directions without onset of dizziness.  PC      Goals Key:  PC= progressing/continue;  PM= partially met;             DC= discontinue     Plan     Continue Plan of Care (POC) and progress per patient tolerance. See treatment section for details on planned progressions next session.    6/13/2023 (evaluation): Outpatient Physical Therapy 2 times weekly for 12 weeks to include any combination of the following interventions: vestibular habituation exercises, dry needling, modalities, electrical stimulation (IFC, Pre-Mod, Attended with Functional Dry Needling), Cervical/Lumbar Traction, Gait Training, Manual Therapy, Neuromuscular Re-ed, Patient Education, Self Care, Therapeutic Exercise, and Therapeutic  Activites       Karina Tucker, PT

## 2023-06-30 ENCOUNTER — CLINICAL SUPPORT (OUTPATIENT)
Dept: AUDIOLOGY | Facility: CLINIC | Age: 53
End: 2023-06-30
Payer: COMMERCIAL

## 2023-06-30 DIAGNOSIS — H90.41 SENSORINEURAL HEARING LOSS (SNHL) OF RIGHT EAR WITH UNRESTRICTED HEARING OF LEFT EAR: ICD-10-CM

## 2023-06-30 DIAGNOSIS — H81.391 OTHER PERIPHERAL VERTIGO, RIGHT EAR: Primary | ICD-10-CM

## 2023-06-30 PROCEDURE — 92537 CALORIC VSTBLR TEST W/REC: CPT | Mod: S$GLB,,,

## 2023-06-30 PROCEDURE — 92567 TYMPANOMETRY: CPT | Mod: S$GLB,,,

## 2023-06-30 PROCEDURE — 92540 PR VESTIBULAR EVAL NYSTAG FOVL&PERPH STIM OSCIL TRACKING: ICD-10-PCS | Mod: S$GLB,,,

## 2023-06-30 PROCEDURE — 92540 BASIC VESTIBULAR EVALUATION: CPT | Mod: S$GLB,,,

## 2023-06-30 PROCEDURE — 92567 PR TYMPA2METRY: ICD-10-PCS | Mod: S$GLB,,,

## 2023-06-30 PROCEDURE — 92557 PR COMPREHENSIVE HEARING TEST: ICD-10-PCS | Mod: S$GLB,,,

## 2023-06-30 PROCEDURE — 92537 PR CALORIC VSTBLR TEST W/REC BITHERMAL: ICD-10-PCS | Mod: S$GLB,,,

## 2023-06-30 PROCEDURE — 99999 PR PBB SHADOW E&M-EST. PATIENT-LVL I: CPT | Mod: PBBFAC,,,

## 2023-06-30 PROCEDURE — 92557 COMPREHENSIVE HEARING TEST: CPT | Mod: S$GLB,,,

## 2023-06-30 PROCEDURE — 99999 PR PBB SHADOW E&M-EST. PATIENT-LVL I: ICD-10-PCS | Mod: PBBFAC,,,

## 2023-06-30 NOTE — Clinical Note
For your review. Mild HF hearing loss in the right ear and right peripheral weakness. Do you think he needs a scan? Pretty sure he is also in your in basket asking about a vaccine.

## 2023-06-30 NOTE — PROGRESS NOTES
Sae Santiago was seen 06/30/2023 for an audiological and vestibular evaluation. Patient was previously being seen for left BPPV with subsequent left Epley maneuver. Patient reported continued dizziness following resolution of left BPPV. He has been following with PT and was most recently seen on 6/27/2023 by Karina Tucker PT     Audiology Report:  Otoscopy was unremarkable in both ears. Tympanograms were Type Ad for the right ear and Type Ad for the left ear. Audiometry revealed a normal hearing sensitivity through 4000 Hz sloping to a mild sensorineural hearing loss for the right ear, and normal hearing sensitivity for the left ear. Speech Reception Thresholds were  5 dBHL for the right ear and 10 dBHL for the left ear. Word recognition scores were excellent for the right ear and excellent for the left ear.    Videonystagmography Report (VNG):  Oculomotor function tests (sinusoidal tracking, saccade, optokinetic) were normal and symmetric.  Spontaneous test revealed 4 d/s left-beating and 4 d/s down-beating nystagmus that suppressed with fixation.  Gaze test was absent for nystagmus.  Head-shake test revealed 7 d/s left-beating nystagmus following head-shake.  Static Positional test revealed the following:   Supine: 2 d/s left-beating nystagmus that suppressed with fixation   Head Right: 2 d/s left-beating nystagmus that suppressed with fixation   Head Left: 2 d/s left-beating nystagmus that suppressed with fixation   Body Right: 3 d/s left-beating nystagmus that suppressed with fixation   Body Left: 4 d/s left-beating nystagmus that suppressed with fixation   [place]-Hallpike Right was negative for BPPV; 5 d/s left-beating nystagmus in supine/sitting that suppressed with fixation.   [place]-Hallpike Left was negative for BPPV; 7 d/s left-beating and 2 d/s down-beating in supine/sitting that suppressed with fixation.  Bi-thermal caloric test was Abnormal - right unilateral weakness.  Fixation suppression  following caloric irrigations was normal. The following values were obtained with 3 d/s spontaneous left-beating nystagmus corrected for: unilateral weakness (UW): 82% right ear and directional preponderance (DP): 24% left beating.  RC: 1 d/s RW: 2 d/s   d/s LW: 20 d/s    Summary: Abnormal VNG consistent with right peripheral vestibular dysfunction.     Patient was counseled on the above findings.    Recommendations:  ENT and PT Review.   Repeat audiological evaluation per ENT, or sooner if needed.    Tracings are to be scanned.

## 2023-07-02 ENCOUNTER — PATIENT MESSAGE (OUTPATIENT)
Dept: OTOLARYNGOLOGY | Facility: CLINIC | Age: 53
End: 2023-07-02
Payer: COMMERCIAL

## 2023-07-03 ENCOUNTER — PATIENT MESSAGE (OUTPATIENT)
Dept: INTERNAL MEDICINE | Facility: CLINIC | Age: 53
End: 2023-07-03
Payer: COMMERCIAL

## 2023-07-05 ENCOUNTER — PATIENT MESSAGE (OUTPATIENT)
Dept: OPHTHALMOLOGY | Facility: CLINIC | Age: 53
End: 2023-07-05

## 2023-07-05 ENCOUNTER — OFFICE VISIT (OUTPATIENT)
Dept: OPHTHALMOLOGY | Facility: CLINIC | Age: 53
End: 2023-07-05
Payer: COMMERCIAL

## 2023-07-05 DIAGNOSIS — H52.203 HYPEROPIA WITH ASTIGMATISM AND PRESBYOPIA, BILATERAL: ICD-10-CM

## 2023-07-05 DIAGNOSIS — H52.4 HYPEROPIA WITH ASTIGMATISM AND PRESBYOPIA, BILATERAL: ICD-10-CM

## 2023-07-05 DIAGNOSIS — H52.03 HYPEROPIA WITH ASTIGMATISM AND PRESBYOPIA, BILATERAL: ICD-10-CM

## 2023-07-05 DIAGNOSIS — H40.013 OAG (OPEN ANGLE GLAUCOMA) SUSPECT, LOW RISK, BILATERAL: ICD-10-CM

## 2023-07-05 DIAGNOSIS — R73.03 PRE-DIABETES: Primary | ICD-10-CM

## 2023-07-05 DIAGNOSIS — H43.813 POSTERIOR VITREOUS DETACHMENT OF BOTH EYES: ICD-10-CM

## 2023-07-05 PROCEDURE — 4010F PR ACE/ARB THEARPY RXD/TAKEN: ICD-10-PCS | Mod: CPTII,S$GLB,, | Performed by: OPTOMETRIST

## 2023-07-05 PROCEDURE — 1159F PR MEDICATION LIST DOCUMENTED IN MEDICAL RECORD: ICD-10-PCS | Mod: CPTII,S$GLB,, | Performed by: OPTOMETRIST

## 2023-07-05 PROCEDURE — 92015 PR REFRACTION: ICD-10-PCS | Mod: S$GLB,,, | Performed by: OPTOMETRIST

## 2023-07-05 PROCEDURE — 92014 COMPRE OPH EXAM EST PT 1/>: CPT | Mod: S$GLB,,, | Performed by: OPTOMETRIST

## 2023-07-05 PROCEDURE — 99999 PR PBB SHADOW E&M-EST. PATIENT-LVL III: CPT | Mod: PBBFAC,,, | Performed by: OPTOMETRIST

## 2023-07-05 PROCEDURE — 2023F DILAT RTA XM W/O RTNOPTHY: CPT | Mod: CPTII,S$GLB,, | Performed by: OPTOMETRIST

## 2023-07-05 PROCEDURE — 2023F PR DILATED RETINAL EXAM W/O EVID OF RETINOPATHY: ICD-10-PCS | Mod: CPTII,S$GLB,, | Performed by: OPTOMETRIST

## 2023-07-05 PROCEDURE — 3044F HG A1C LEVEL LT 7.0%: CPT | Mod: CPTII,S$GLB,, | Performed by: OPTOMETRIST

## 2023-07-05 PROCEDURE — 99999 PR PBB SHADOW E&M-EST. PATIENT-LVL III: ICD-10-PCS | Mod: PBBFAC,,, | Performed by: OPTOMETRIST

## 2023-07-05 PROCEDURE — 3044F PR MOST RECENT HEMOGLOBIN A1C LEVEL <7.0%: ICD-10-PCS | Mod: CPTII,S$GLB,, | Performed by: OPTOMETRIST

## 2023-07-05 PROCEDURE — 4010F ACE/ARB THERAPY RXD/TAKEN: CPT | Mod: CPTII,S$GLB,, | Performed by: OPTOMETRIST

## 2023-07-05 PROCEDURE — 92015 DETERMINE REFRACTIVE STATE: CPT | Mod: S$GLB,,, | Performed by: OPTOMETRIST

## 2023-07-05 PROCEDURE — 1159F MED LIST DOCD IN RCRD: CPT | Mod: CPTII,S$GLB,, | Performed by: OPTOMETRIST

## 2023-07-05 PROCEDURE — 92014 PR EYE EXAM, EST PATIENT,COMPREHESV: ICD-10-PCS | Mod: S$GLB,,, | Performed by: OPTOMETRIST

## 2023-07-05 NOTE — PROGRESS NOTES
HPI     Annual Exam            Comments: About 3 weeks ago had vertigo and was seeing double vision.  Vision changes since last eye exam?: No changes, doing well.    Any eye pain today: No    Other ocular symptoms: No    Interested in contact lens fitting today? No             Comments    1. Glaucoma Suspect          Last edited by Vini Paez on 7/5/2023  4:01 PM.            Assessment /Plan     For exam results, see Encounter Report.    Pre-diabetes  Last A1c 6.1 There was no diabetic retinopathy present on either eye on examination today. Recommend good blood pressure control, strict blood glucose control, and good cholesterol control.  Continue close care with Dr. Reyes regarding diabetes.     OAG (open angle glaucoma) suspect, low risk, bilateral  The patient has the following Glaucoma risk factors: Optic Nerve Asymmetry    RNFL shows possible progression however nice healthy ganglion cell layer, Recommend close observation off drops at this time.   Repeat gOCT x 12 months.     Hyperopia with astigmatism and presbyopia, bilateral  Eyeglass Final Rx       Eyeglass Final Rx         Sphere Cylinder Axis Add    Right +0.50 +0.25 020 +2.50    Left +0.00 +0.50 017 +2.50      Type: PAL    Expiration Date: 7/5/2024                 OK to wear OTC readers +2.50.     RTC 1 yr for dilated eye exam with gOCT or sooner if any changes to vision.   Discussed above and answered questions.

## 2023-07-07 ENCOUNTER — IMMUNIZATION (OUTPATIENT)
Dept: PHARMACY | Facility: CLINIC | Age: 53
End: 2023-07-07
Payer: COMMERCIAL

## 2023-07-13 ENCOUNTER — PATIENT MESSAGE (OUTPATIENT)
Dept: OTOLARYNGOLOGY | Facility: CLINIC | Age: 53
End: 2023-07-13
Payer: COMMERCIAL

## 2023-07-13 DIAGNOSIS — H81.91 PERIPHERAL VESTIBULOPATHY OF RIGHT EAR: Primary | ICD-10-CM

## 2023-07-19 ENCOUNTER — OFFICE VISIT (OUTPATIENT)
Dept: URGENT CARE | Facility: CLINIC | Age: 53
End: 2023-07-19
Payer: COMMERCIAL

## 2023-07-19 VITALS
OXYGEN SATURATION: 98 % | BODY MASS INDEX: 32.07 KG/M2 | HEART RATE: 75 BPM | HEIGHT: 73 IN | SYSTOLIC BLOOD PRESSURE: 147 MMHG | RESPIRATION RATE: 18 BRPM | TEMPERATURE: 98 F | WEIGHT: 242 LBS | DIASTOLIC BLOOD PRESSURE: 77 MMHG

## 2023-07-19 DIAGNOSIS — M10.9 GOUT, UNSPECIFIED CAUSE, UNSPECIFIED CHRONICITY, UNSPECIFIED SITE: Primary | ICD-10-CM

## 2023-07-19 PROCEDURE — 99214 OFFICE O/P EST MOD 30 MIN: CPT | Mod: 25,S$GLB,, | Performed by: NURSE PRACTITIONER

## 2023-07-19 PROCEDURE — 96372 THER/PROPH/DIAG INJ SC/IM: CPT | Mod: S$GLB,,, | Performed by: NURSE PRACTITIONER

## 2023-07-19 PROCEDURE — 99214 PR OFFICE/OUTPT VISIT, EST, LEVL IV, 30-39 MIN: ICD-10-PCS | Mod: 25,S$GLB,, | Performed by: NURSE PRACTITIONER

## 2023-07-19 PROCEDURE — 96372 PR INJECTION,THERAP/PROPH/DIAG2ST, IM OR SUBCUT: ICD-10-PCS | Mod: S$GLB,,, | Performed by: NURSE PRACTITIONER

## 2023-07-19 RX ORDER — KETOROLAC TROMETHAMINE 30 MG/ML
30 INJECTION, SOLUTION INTRAMUSCULAR; INTRAVENOUS
Status: COMPLETED | OUTPATIENT
Start: 2023-07-19 | End: 2023-07-19

## 2023-07-19 RX ORDER — NAPROXEN 500 MG/1
500 TABLET ORAL 2 TIMES DAILY
Qty: 20 TABLET | Refills: 0 | Status: SHIPPED | OUTPATIENT
Start: 2023-07-19

## 2023-07-19 RX ADMIN — KETOROLAC TROMETHAMINE 30 MG: 30 INJECTION, SOLUTION INTRAMUSCULAR; INTRAVENOUS at 09:07

## 2023-07-19 NOTE — PROGRESS NOTES
"Subjective:      Patient ID: Sae Santiago is a 52 y.o. male.    Vitals:  height is 6' 1" (1.854 m) and weight is 109.8 kg (242 lb). His tympanic temperature is 97.8 °F (36.6 °C). His blood pressure is 147/77 (abnormal) and his pulse is 75. His respiration is 18 and oxygen saturation is 98%.     Chief Complaint: Toe Pain (Pt stated left big toe pain x2 days. /)    Pt stated left big toe pain x2 days. Hx of gout in the same spot 8 years ago. Was given colchicine at that time and states it made him dizzy.    Toe Pain   The incident occurred 2 days ago. The incident occurred at home. There was no injury mechanism. The pain is present in the left toes. The quality of the pain is described as aching. The pain is at a severity of 6/10. The pain is moderate. The pain has been Constant since onset. He reports no foreign bodies present. He has tried acetaminophen and NSAIDs for the symptoms. The treatment provided no relief.     Constitution: Negative for chills and fever.   Neck: Negative for neck pain.   Cardiovascular:  Negative for chest pain.   Respiratory:  Negative for shortness of breath.    Gastrointestinal:  Negative for abdominal pain, nausea and vomiting.   Musculoskeletal:  Positive for joint pain and joint swelling.   Skin:  Positive for color change and erythema. Negative for rash and wound.   Neurological:  Negative for disorientation.   Hematologic/Lymphatic: Negative for easy bruising/bleeding. Does not bruise/bleed easily.   Psychiatric/Behavioral:  Negative for disorientation and confusion.     Objective:     Physical Exam   Constitutional: He is oriented to person, place, and time.   HENT:   Head: Normocephalic and atraumatic.   Nose: Nose normal.   Eyes: Conjunctivae are normal.   Cardiovascular: Normal rate.   Pulmonary/Chest: Effort normal. No respiratory distress.   Abdominal: Normal appearance.   Musculoskeletal: Normal range of motion.         General: Swelling and tenderness present. Normal " range of motion.      Comments: Left great toe joint erythematous, warm, TTP.    Neurological: He is alert and oriented to person, place, and time.   Skin: Skin is warm and dry. erythema   Psychiatric: Mood and thought content normal.   Nursing note and vitals reviewed.    Assessment:     1. Gout, unspecified cause, unspecified chronicity, unspecified site        Plan:       Gout, unspecified cause, unspecified chronicity, unspecified site  -     ketorolac injection 30 mg  -     naproxen (NAPROSYN) 500 MG tablet; Take 1 tablet (500 mg total) by mouth 2 (two) times daily.  Dispense: 20 tablet; Refill: 0      He will discuss starting allopurinol with Dr. Amy Sales by elevating the affected joint.    An ice pack may help.  Apply the ice pack for 15 minutes at a time.      -Reduce the amount of animal protein you eat.   Organ meats (liver, brains, kidney and sweetbreads), anchovies, herring and mackerel are particularly high in purines.   -Avoid alcohol. Alcohol can inhibit the excretion of uric acid. If you're having a gout attack, it's best to avoid alcohol completely.     Follow up in 1 week if symptoms do not resolve, sooner if worse.

## 2023-07-27 ENCOUNTER — PATIENT MESSAGE (OUTPATIENT)
Dept: INTERNAL MEDICINE | Facility: CLINIC | Age: 53
End: 2023-07-27
Payer: COMMERCIAL

## 2023-07-28 ENCOUNTER — TELEPHONE (OUTPATIENT)
Dept: INTERNAL MEDICINE | Facility: CLINIC | Age: 53
End: 2023-07-28
Payer: COMMERCIAL

## 2023-07-28 NOTE — TELEPHONE ENCOUNTER
----- Message from Tori Morton sent at 7/28/2023  8:15 AM CDT -----  Regarding: pt called  Name of Who is Calling: KARTHIKEYAN BOLES [[id number]]      What is the request in detail: pt is requesting to be seen on today for gout. I did check for the patient first appt available was on Monday. Please advise       Can the clinic reply by [facility]: No      What Number to Call Back if not in [facility]: Telephone Information:           [phone]

## 2023-07-28 NOTE — TELEPHONE ENCOUNTER
Returned call to pt regarding an appointment. Advised him that Dr. Reyes is currently out of the clinic and there are no appts available today in clinic. Appt scheduled for 07/31/23 with NP. Pt verbalized understanding of appt date and time.

## 2023-09-05 ENCOUNTER — LAB VISIT (OUTPATIENT)
Dept: LAB | Facility: HOSPITAL | Age: 53
End: 2023-09-05
Attending: PEDIATRICS
Payer: COMMERCIAL

## 2023-09-05 ENCOUNTER — PATIENT MESSAGE (OUTPATIENT)
Dept: INTERNAL MEDICINE | Facility: CLINIC | Age: 53
End: 2023-09-05
Payer: COMMERCIAL

## 2023-09-05 DIAGNOSIS — E78.5 HYPERLIPIDEMIA, UNSPECIFIED HYPERLIPIDEMIA TYPE: ICD-10-CM

## 2023-09-05 DIAGNOSIS — R73.03 PRE-DIABETES: ICD-10-CM

## 2023-09-05 DIAGNOSIS — F41.9 ANXIETY: ICD-10-CM

## 2023-09-05 LAB
ALBUMIN SERPL BCP-MCNC: 4 G/DL (ref 3.5–5.2)
ALP SERPL-CCNC: 60 U/L (ref 55–135)
ALT SERPL W/O P-5'-P-CCNC: 25 U/L (ref 10–44)
ANION GAP SERPL CALC-SCNC: 10 MMOL/L (ref 8–16)
AST SERPL-CCNC: 20 U/L (ref 10–40)
BILIRUB SERPL-MCNC: 0.5 MG/DL (ref 0.1–1)
BUN SERPL-MCNC: 16 MG/DL (ref 6–20)
CALCIUM SERPL-MCNC: 9.6 MG/DL (ref 8.7–10.5)
CHLORIDE SERPL-SCNC: 102 MMOL/L (ref 95–110)
CHOLEST SERPL-MCNC: 145 MG/DL (ref 120–199)
CHOLEST/HDLC SERPL: 4.4 {RATIO} (ref 2–5)
CO2 SERPL-SCNC: 29 MMOL/L (ref 23–29)
CREAT SERPL-MCNC: 1.1 MG/DL (ref 0.5–1.4)
EST. GFR  (NO RACE VARIABLE): >60 ML/MIN/1.73 M^2
ESTIMATED AVG GLUCOSE: 131 MG/DL (ref 68–131)
GLUCOSE SERPL-MCNC: 137 MG/DL (ref 70–110)
HBA1C MFR BLD: 6.2 % (ref 4–5.6)
HDLC SERPL-MCNC: 33 MG/DL (ref 40–75)
HDLC SERPL: 22.8 % (ref 20–50)
LDLC SERPL CALC-MCNC: 66.2 MG/DL (ref 63–159)
NONHDLC SERPL-MCNC: 112 MG/DL
POTASSIUM SERPL-SCNC: 4.6 MMOL/L (ref 3.5–5.1)
PROT SERPL-MCNC: 7 G/DL (ref 6–8.4)
SODIUM SERPL-SCNC: 141 MMOL/L (ref 136–145)
TRIGL SERPL-MCNC: 229 MG/DL (ref 30–150)

## 2023-09-05 PROCEDURE — 83036 HEMOGLOBIN GLYCOSYLATED A1C: CPT | Performed by: PEDIATRICS

## 2023-09-05 PROCEDURE — 80053 COMPREHEN METABOLIC PANEL: CPT | Performed by: PEDIATRICS

## 2023-09-05 PROCEDURE — 80061 LIPID PANEL: CPT | Performed by: PEDIATRICS

## 2023-09-05 PROCEDURE — 36415 COLL VENOUS BLD VENIPUNCTURE: CPT | Performed by: PEDIATRICS

## 2023-09-06 RX ORDER — BUSPIRONE HYDROCHLORIDE 10 MG/1
10 TABLET ORAL 3 TIMES DAILY
Qty: 270 TABLET | Refills: 3 | Status: SHIPPED | OUTPATIENT
Start: 2023-09-06 | End: 2024-01-01 | Stop reason: SDUPTHER

## 2023-09-06 RX ORDER — ROSUVASTATIN CALCIUM 40 MG/1
40 TABLET, COATED ORAL NIGHTLY
Qty: 90 TABLET | Refills: 3 | Status: SHIPPED | OUTPATIENT
Start: 2023-09-06 | End: 2023-12-22 | Stop reason: SDUPTHER

## 2023-09-06 NOTE — TELEPHONE ENCOUNTER
No care due was identified.  [facility] Embedded Care Due Messages. Reference number: [id number].   9/06/2023 8:35:20 AM CDT

## 2023-09-06 NOTE — TELEPHONE ENCOUNTER
Refill Routing Note   Medication(s) are not appropriate for processing by [facility] for the following reason(s):      Clarification of medication (Rx) details    [facility] action(s):  Defer Care Due:  None identified     Medication Therapy Plan: O 6/3/23 patient said he was taking Crestor 20 mg nightly; per epic adherence he has only filled the 40 mgs twice in a year. A script is not active on med list for the 20 mgs      Appointments  past 12m or future 3m with PCP    Date Provider   Last Visit   6/9/2023 Librado Reyes MD   Next Visit   9/15/2023 Librado Reyes MD   ED visits in past 90 days: 0        Note composed:8:41 AM 09/06/2023

## 2023-09-15 ENCOUNTER — OFFICE VISIT (OUTPATIENT)
Dept: INTERNAL MEDICINE | Facility: CLINIC | Age: 53
End: 2023-09-15
Payer: COMMERCIAL

## 2023-09-15 VITALS
TEMPERATURE: 97 F | HEIGHT: 73 IN | RESPIRATION RATE: 17 BRPM | OXYGEN SATURATION: 99 % | DIASTOLIC BLOOD PRESSURE: 78 MMHG | SYSTOLIC BLOOD PRESSURE: 124 MMHG | HEART RATE: 66 BPM | BODY MASS INDEX: 32.4 KG/M2 | WEIGHT: 244.5 LBS

## 2023-09-15 DIAGNOSIS — R73.03 PRE-DIABETES: ICD-10-CM

## 2023-09-15 DIAGNOSIS — I10 HYPERTENSION, UNSPECIFIED TYPE: Primary | ICD-10-CM

## 2023-09-15 DIAGNOSIS — M10.9 GOUT OF FOOT, UNSPECIFIED CAUSE, UNSPECIFIED CHRONICITY, UNSPECIFIED LATERALITY: ICD-10-CM

## 2023-09-15 DIAGNOSIS — Z12.5 PROSTATE CANCER SCREENING: ICD-10-CM

## 2023-09-15 DIAGNOSIS — F41.9 ANXIETY: ICD-10-CM

## 2023-09-15 DIAGNOSIS — R16.2 HEPATOSPLENOMEGALY: ICD-10-CM

## 2023-09-15 DIAGNOSIS — E78.5 HYPERLIPIDEMIA, UNSPECIFIED HYPERLIPIDEMIA TYPE: ICD-10-CM

## 2023-09-15 DIAGNOSIS — E66.9 OBESITY (BMI 30.0-34.9): ICD-10-CM

## 2023-09-15 PROCEDURE — 1159F MED LIST DOCD IN RCRD: CPT | Mod: CPTII,S$GLB,, | Performed by: PEDIATRICS

## 2023-09-15 PROCEDURE — 1160F RVW MEDS BY RX/DR IN RCRD: CPT | Mod: CPTII,S$GLB,, | Performed by: PEDIATRICS

## 2023-09-15 PROCEDURE — 1159F PR MEDICATION LIST DOCUMENTED IN MEDICAL RECORD: ICD-10-PCS | Mod: CPTII,S$GLB,, | Performed by: PEDIATRICS

## 2023-09-15 PROCEDURE — 3044F HG A1C LEVEL LT 7.0%: CPT | Mod: CPTII,S$GLB,, | Performed by: PEDIATRICS

## 2023-09-15 PROCEDURE — 1160F PR REVIEW ALL MEDS BY PRESCRIBER/CLIN PHARMACIST DOCUMENTED: ICD-10-PCS | Mod: CPTII,S$GLB,, | Performed by: PEDIATRICS

## 2023-09-15 PROCEDURE — 99214 PR OFFICE/OUTPT VISIT, EST, LEVL IV, 30-39 MIN: ICD-10-PCS | Mod: S$GLB,,, | Performed by: PEDIATRICS

## 2023-09-15 PROCEDURE — 3074F SYST BP LT 130 MM HG: CPT | Mod: CPTII,S$GLB,, | Performed by: PEDIATRICS

## 2023-09-15 PROCEDURE — 3074F PR MOST RECENT SYSTOLIC BLOOD PRESSURE < 130 MM HG: ICD-10-PCS | Mod: CPTII,S$GLB,, | Performed by: PEDIATRICS

## 2023-09-15 PROCEDURE — 99999 PR PBB SHADOW E&M-EST. PATIENT-LVL IV: CPT | Mod: PBBFAC,,, | Performed by: PEDIATRICS

## 2023-09-15 PROCEDURE — 3078F DIAST BP <80 MM HG: CPT | Mod: CPTII,S$GLB,, | Performed by: PEDIATRICS

## 2023-09-15 PROCEDURE — 3008F PR BODY MASS INDEX (BMI) DOCUMENTED: ICD-10-PCS | Mod: CPTII,S$GLB,, | Performed by: PEDIATRICS

## 2023-09-15 PROCEDURE — 4010F PR ACE/ARB THEARPY RXD/TAKEN: ICD-10-PCS | Mod: CPTII,S$GLB,, | Performed by: PEDIATRICS

## 2023-09-15 PROCEDURE — 3044F PR MOST RECENT HEMOGLOBIN A1C LEVEL <7.0%: ICD-10-PCS | Mod: CPTII,S$GLB,, | Performed by: PEDIATRICS

## 2023-09-15 PROCEDURE — 99214 OFFICE O/P EST MOD 30 MIN: CPT | Mod: S$GLB,,, | Performed by: PEDIATRICS

## 2023-09-15 PROCEDURE — 4010F ACE/ARB THERAPY RXD/TAKEN: CPT | Mod: CPTII,S$GLB,, | Performed by: PEDIATRICS

## 2023-09-15 PROCEDURE — 3008F BODY MASS INDEX DOCD: CPT | Mod: CPTII,S$GLB,, | Performed by: PEDIATRICS

## 2023-09-15 PROCEDURE — 3078F PR MOST RECENT DIASTOLIC BLOOD PRESSURE < 80 MM HG: ICD-10-PCS | Mod: CPTII,S$GLB,, | Performed by: PEDIATRICS

## 2023-09-15 PROCEDURE — 99999 PR PBB SHADOW E&M-EST. PATIENT-LVL IV: ICD-10-PCS | Mod: PBBFAC,,, | Performed by: PEDIATRICS

## 2023-09-15 RX ORDER — METFORMIN HYDROCHLORIDE 500 MG/1
1000 TABLET ORAL 2 TIMES DAILY WITH MEALS
Qty: 360 TABLET | Refills: 3 | Status: SHIPPED | OUTPATIENT
Start: 2023-09-15 | End: 2024-01-01 | Stop reason: SDUPTHER

## 2023-09-15 RX ORDER — COLCHICINE 0.6 MG/1
TABLET ORAL
Qty: 12 TABLET | Refills: 1 | Status: SHIPPED | OUTPATIENT
Start: 2023-09-15

## 2023-09-15 NOTE — PROGRESS NOTES
Subjective:       Patient ID: Sae Santiago is a 53 y.o. male.    Chief Complaint: Follow-up    Sae Santiago is a 53 y.o. male who presents to the clinic for a follow up.     1) HTN: B/P good, no HTNive symptoms. Is home monitoring. Dig HTN med entries reviewed. Thinks the episodic increased BP is stress induced.   2) Anxiety: buspar intermittently working, has been taking 5mg 3x/day and 2.5mg PRN at night to help sleep, avoids Ativan use as much as possible, wife agrees he is doing quiet well. [place] shows [place]ivan refill in 2 years  3) Gout: last episode was 2023, using tart cherry. He does not wish to take daily allopurinol, wishes to continue lifestyle mod and use PRN colchicine   4) Obesity: not following D&E, weight is up  5) Hyperlipidemia: not following D&E, is on 20mg crestor   6) Varicosities of leg: quiet     LABS REVIEWED AND DISCUSSED WITH PATIENT: CMP, A1C, and lipid panel     Follow-up  Pertinent negatives include no abdominal pain, chest pain, chills, coughing, diaphoresis, fatigue, fever, nausea or vomiting.   Review of Systems   Constitutional:  Negative for chills, diaphoresis, fatigue and fever.   HENT:  Negative for trouble swallowing.    Respiratory:  Negative for cough and shortness of breath.    Cardiovascular:  Negative for chest pain.   Gastrointestinal:  Negative for abdominal distention, abdominal pain, constipation, diarrhea, nausea and vomiting.   Endocrine: Negative for cold intolerance, heat intolerance, polydipsia, polyphagia and polyuria.   All other systems reviewed and are negative.      Objective:      Physical Exam  Constitutional:       General: He is not in acute distress.     Appearance: Normal appearance. He is obese. He is not ill-appearing or toxic-appearing.   Cardiovascular:      Rate and Rhythm: Normal rate and regular rhythm.      Pulses: Normal pulses.      Heart sounds: Normal heart sounds. No murmur heard.     No friction rub. No gallop.    Pulmonary:      Effort: Pulmonary effort is normal.      Breath sounds: Normal breath sounds.   Abdominal:      General: There is no distension.      Palpations: There is no mass.      Tenderness: There is no abdominal tenderness. There is no guarding or rebound.      Hernia: No hernia is present.   Neurological:      Mental Status: He is alert and oriented to person, place, and time.   Psychiatric:         Mood and Affect: Mood normal.         Behavior: Behavior normal.         Thought Content: Thought content normal.         Judgment: Judgment normal.         Assessment:       1. Hypertension, unspecified type    2. Hyperlipidemia, unspecified hyperlipidemia type    3. Anxiety    4. Obesity (BMI 30.0-34.9)    5. Pre-diabetes    6. Gout of foot, unspecified cause, unspecified chronicity, unspecified laterality    7. Hepatosplenomegaly    8. Prostate cancer screening        Plan:       Hypertension, unspecified type    Hyperlipidemia, unspecified hyperlipidemia type  -     Comprehensive Metabolic Panel; Future; Expected date: 09/15/2023  -     Lipid Panel; Future; Expected date: 09/15/2023    Anxiety    Obesity (BMI 30.0-34.9)    Pre-diabetes  -     Hemoglobin A1C; Future; Expected date: 09/15/2023  -     metFORMIN (GLUCOPHAGE) 500 MG tablet; Take 2 tablets (1,000 mg total) by mouth 2 (two) times daily with meals.  Dispense: 360 tablet; Refill: 3    Gout of foot, unspecified cause, unspecified chronicity, unspecified laterality  -     URIC ACID; Future; Expected date: 09/15/2023  -     colchicine, gout, (COLCRYS) 0.6 mg tablet; Take 2 tablets at onset and repeat 1 tablet in 1 hour  Dispense: 12 tablet; Refill: 1    Hepatosplenomegaly  -     Ambulatory referral/consult to Hepatology; Future; Expected date: 09/22/2023    Prostate cancer screening  -     PSA, Screening; Future; Expected date: 09/15/2023    Pre-diabetes discussed, he will put effort into lifestyle modifications. We discussed metformin and potential  GLP-1 use. He has elected to start metformin and when his insurance changes he will check into GLP-1 insurance coverage. Will use PRN colchicine, increase crestor to 40 mg, D&E , weight loss. Vaccines reviewed. Maintain other baseline medicines. Continue with digital medicine. Follow up in 6 months with labs.

## 2023-09-21 ENCOUNTER — PATIENT MESSAGE (OUTPATIENT)
Dept: HEPATOLOGY | Facility: CLINIC | Age: 53
End: 2023-09-21
Payer: COMMERCIAL

## 2023-09-27 ENCOUNTER — PATIENT MESSAGE (OUTPATIENT)
Dept: INTERNAL MEDICINE | Facility: CLINIC | Age: 53
End: 2023-09-27
Payer: COMMERCIAL

## 2023-09-27 ENCOUNTER — PATIENT MESSAGE (OUTPATIENT)
Dept: HEPATOLOGY | Facility: CLINIC | Age: 53
End: 2023-09-27
Payer: COMMERCIAL

## 2023-09-27 DIAGNOSIS — I10 WHITE COAT SYNDROME WITH DIAGNOSIS OF HYPERTENSION: ICD-10-CM

## 2023-09-27 RX ORDER — METOPROLOL SUCCINATE 25 MG/1
TABLET, EXTENDED RELEASE ORAL
Qty: 135 TABLET | Refills: 3 | Status: SHIPPED | OUTPATIENT
Start: 2023-09-27 | End: 2024-02-11 | Stop reason: SDUPTHER

## 2023-09-27 RX ORDER — LOSARTAN POTASSIUM 100 MG/1
100 TABLET ORAL DAILY
Qty: 90 TABLET | Refills: 3 | Status: SHIPPED | OUTPATIENT
Start: 2023-09-27 | End: 2024-01-01 | Stop reason: SDUPTHER

## 2023-09-27 RX ORDER — HYDROCHLOROTHIAZIDE 25 MG/1
25 TABLET ORAL DAILY
Qty: 90 TABLET | Refills: 3 | Status: SHIPPED | OUTPATIENT
Start: 2023-09-27 | End: 2023-12-26 | Stop reason: SDUPTHER

## 2023-09-27 NOTE — TELEPHONE ENCOUNTER
Care Due:                  Date            Visit Type   Department     Provider  --------------------------------------------------------------------------------                                EP -                              PRIMARY      [facility] INTERNAL  Last Visit: 09-      CARE ([facility])   [facility]       Librado Reyes                              EP -                              PRIMARY      [facility] INTERNAL  Next Visit: 03-      CARE ([facility])   [facility]                                                            Last  Test          Frequency    Reason                     Performed    Due Date  --------------------------------------------------------------------------------    Uric Acid...  12 months..  colchicine,..............  04- 04-    [employer] Embedded Care Due Messages. Reference number: [id number].   9/27/2023 2:23:14 PM CDT

## 2023-09-27 NOTE — TELEPHONE ENCOUNTER
Provider Staff:  Action required for this patient     Please see care gap opportunities below in Care Due Message.    Thanks!  [facility]     Appointments      Date Provider   Last Visit   9/15/2023 Librado Reyes MD   Next Visit   3/15/2024 Librado Reyes MD     Refill Decision Note   Sae Santiago  is requesting a refill authorization.  Brief Assessment and Rationale for Refill:  Approve     Medication Therapy Plan:         Comments:     Note composed:2:58 PM 09/27/2023

## 2023-10-18 ENCOUNTER — PATIENT MESSAGE (OUTPATIENT)
Dept: HEPATOLOGY | Facility: CLINIC | Age: 53
End: 2023-10-18
Payer: COMMERCIAL

## 2023-11-24 ENCOUNTER — DOCUMENTATION ONLY (OUTPATIENT)
Dept: REHABILITATION | Facility: HOSPITAL | Age: 53
End: 2023-11-24
Payer: COMMERCIAL

## 2023-11-24 DIAGNOSIS — R42 VERTIGO: Primary | ICD-10-CM

## 2023-11-24 NOTE — PROGRESS NOTES
[facility]  Physical Therapy Discharge Note    Name: Sae [facility] Number: [id number]    Therapy Diagnosis:   Encounter Diagnosis   Name Primary?    Vertigo Yes     Physician: Claudette Feliz NP      Physician Orders: PT Eval and Treat  Medical Diagnosis from Referral: Vertigo  Evaluation Date: 6/13/2023      Date of Last visit: 6/27/2023  Total Visits Received: 2    ASSESSMENT      Patient last messaged PT that he was keeping up with his HEP and doing a lot better. He is okay with DC at this time.     Discharge reason: Patient requested discharge as he is feeling much better and keeping up with his HEP.    Discharge FOTO Score: N/A    GOALS:     Short Term Goals:  6 weeks Progress   Dizziness: Patient will report decreased dizziness, and recent s/s trend is improving in order to progress towards LTG's. PC   Function: Patient will demonstrate improved function as indicated by a functional status score of less than or equal to 43 out of 100 on FOTO. PC   Gait: Patient will demonstrate improved gait mechanics in order to improve functional mobility, improve quality of life, and decrease risk of further injury or fall.  PC   HEP: Patient will demonstrate independence with HEP in order to progress toward functional independence. PC   Improve postural awareness.  PC         Long Term Goals:  12 weeks Progress   Dizziness:  Patient will return to normal ADL, recreational, and work related activities with less dizziness and limitation.  PC   Function: Patient will demonstrate improved function as indicated by a functional status score of less than or equal to 37 out of 100 on FOTO. PC   Gait: Patient will demonstrate normalized gait mechanics with minimal compensation in order to return to PLOF. PC   Decrease saccades as well as positional and VOR related s/s in order for patient to be able to switch positions or look quickly to different directions without onset of dizziness.  PC       Goals Key:  PC= progressing/continue;  PM= partially met;             DC= discontinue     PLAN   This patient is discharged from Physical Therapy      Karina Tucker PT

## 2023-12-22 ENCOUNTER — PATIENT MESSAGE (OUTPATIENT)
Dept: INTERNAL MEDICINE | Facility: CLINIC | Age: 53
End: 2023-12-22
Payer: COMMERCIAL

## 2023-12-22 DIAGNOSIS — E78.5 HYPERLIPIDEMIA, UNSPECIFIED HYPERLIPIDEMIA TYPE: ICD-10-CM

## 2023-12-22 RX ORDER — ROSUVASTATIN CALCIUM 40 MG/1
40 TABLET, COATED ORAL NIGHTLY
Qty: 90 TABLET | Refills: 3 | Status: SHIPPED | OUTPATIENT
Start: 2023-12-22 | End: 2024-12-21

## 2023-12-22 NOTE — TELEPHONE ENCOUNTER
Care Due:                  Date            Visit Type   Department     Provider  --------------------------------------------------------------------------------                                EP -                              PRIMARY      [facility] INTERNAL  Last Visit: 09-      CARE ([facility])   MEDICINE       Librado Reyes  Next Visit: None Scheduled  None         None Found                                                            Last  Test          Frequency    Reason                     Performed    Due Date  --------------------------------------------------------------------------------    HBA1C.......  6 months...  metFORMIN................  09- 03-    Uric Acid...  12 months..  colchicine,..............  04- 04-    [employer] Embedded Care Due Messages. Reference number: [id number].   12/22/2023 9:52:27 AM CST

## 2023-12-22 NOTE — TELEPHONE ENCOUNTER
Spoke with Clovis and he stated that patient is switching his prescriptions back to the [facility] in [place] and will need a new prescription for Crestor sent there.

## 2023-12-22 NOTE — TELEPHONE ENCOUNTER
----- Message from Sydnee Hoffman sent at 12/22/2023  9:18 AM CST -----  Contact: Clovis  Type:  Pharmacy Calling to Clarify an RX    Name of Caller: Clovis  Pharmacy Name: [facility]   Prescription Name:metoprolol succinate (TOPROL-XL) 25 MG 24 hr tablet  What do they need to clarify?:  Best Call Back Number: [phone]   Additional Information:  Clovis from [facility] is requesting a notice be sent to the provider that they never received refill prescriptions requests.

## 2023-12-26 DIAGNOSIS — I10 WHITE COAT SYNDROME WITH DIAGNOSIS OF HYPERTENSION: ICD-10-CM

## 2023-12-26 NOTE — TELEPHONE ENCOUNTER
Refill Routing Note   Medication(s) are not appropriate for processing by [facility] for the following reason(s):        Drug-disease interaction    [facility] action(s):  Defer        Medication Therapy Plan: hydroCHLOROthiazide and Gout    Pharmacist review requested: Yes     Appointments  past 12m or future 3m with PCP    Date Provider   Last Visit   9/15/2023 Librado Reyes MD   Next Visit   3/15/2024 Librado Reyes MD   ED visits in past 90 days: 0        Note composed:12:27 PM 12/26/2023            Bexarotene Counseling:  I discussed with the patient the risks of bexarotene including but not limited to hair loss, dry lips/skin/eyes, liver abnormalities, hyperlipidemia, pancreatitis, depression/suicidal ideation, photosensitivity, drug rash/allergic reactions, hypothyroidism, anemia, leukopenia, infection, cataracts, and teratogenicity.  Patient understands that they will need regular blood tests to check lipid profile, liver function tests, white blood cell count, thyroid function tests and pregnancy test if applicable.

## 2023-12-26 NOTE — TELEPHONE ENCOUNTER
No care due was identified.  [facility] Embedded Care Due Messages. Reference number: [id number].   12/26/2023 9:21:33 AM CST

## 2023-12-27 RX ORDER — HYDROCHLOROTHIAZIDE 25 MG/1
25 TABLET ORAL DAILY
Qty: 90 TABLET | Refills: 2 | Status: SHIPPED | OUTPATIENT
Start: 2023-12-27

## 2023-12-27 NOTE — TELEPHONE ENCOUNTER
Refill Decision Note   Sae Santiago  is requesting a refill authorization.  Brief Assessment and Rationale for Refill:  Approve     Medication Therapy Plan:         Pharmacist review requested: Yes   Extended chart review required: Yes   Comments:     Note composed:11:02 AM 12/27/2023

## 2024-01-01 DIAGNOSIS — I10 WHITE COAT SYNDROME WITH DIAGNOSIS OF HYPERTENSION: ICD-10-CM

## 2024-01-01 DIAGNOSIS — R73.03 PRE-DIABETES: ICD-10-CM

## 2024-01-01 DIAGNOSIS — F41.9 ANXIETY: ICD-10-CM

## 2024-01-02 RX ORDER — METFORMIN HYDROCHLORIDE 500 MG/1
1000 TABLET ORAL 2 TIMES DAILY WITH MEALS
Qty: 360 TABLET | Refills: 0 | Status: SHIPPED | OUTPATIENT
Start: 2024-01-02

## 2024-01-02 RX ORDER — LOSARTAN POTASSIUM 100 MG/1
100 TABLET ORAL DAILY
Qty: 90 TABLET | Refills: 2 | Status: SHIPPED | OUTPATIENT
Start: 2024-01-02

## 2024-01-02 NOTE — TELEPHONE ENCOUNTER
No care due was identified.  [facility] Embedded Care Due Messages. Reference number: [id number].   1/01/2024 10:54:47 PM CST

## 2024-01-03 RX ORDER — BUSPIRONE HYDROCHLORIDE 10 MG/1
10 TABLET ORAL 3 TIMES DAILY
Qty: 270 TABLET | Refills: 3 | Status: SHIPPED | OUTPATIENT
Start: 2024-01-03 | End: 2025-01-02

## 2024-01-03 NOTE — TELEPHONE ENCOUNTER
Refill Routing Note   Medication(s) are not appropriate for processing by [facility] for the following reason(s):        Drug-disease interaction  Outside of protocol    [facility] action(s):  Defer  Route  Approve        Medication Therapy Plan: Drug-Disease: metFORMIN and Hepatosplenomegaly    Pharmacist review requested: Yes     Appointments  past 12m or future 3m with PCP    Date Provider   Last Visit   9/15/2023 Librado Reyes MD   Next Visit   3/15/2024 Librado Reyes MD   ED visits in past 90 days: 0        Note composed:8:14 PM 01/02/2024

## 2024-01-03 NOTE — TELEPHONE ENCOUNTER
Refill Routing Note   Medication(s) are not appropriate for processing by [facility] for the following reason(s):      Medication outside of protocol    [facility] action(s):  Route  Approve Care Due:  None identified     Medication Therapy Plan: Drug-Disease: metFORMIN and Hepatosplenomegaly    Pharmacist review requested: Yes     Appointments  past 12m or future 3m with PCP    Date Provider   Last Visit   9/15/2023 Librado Reyes MD   Next Visit   3/15/2024 Librado Reyes MD   ED visits in past 90 days: 0        Note composed:8:17 PM 01/02/2024

## 2024-02-11 DIAGNOSIS — I10 WHITE COAT SYNDROME WITH DIAGNOSIS OF HYPERTENSION: ICD-10-CM

## 2024-02-12 RX ORDER — METOPROLOL SUCCINATE 25 MG/1
TABLET, EXTENDED RELEASE ORAL
Qty: 135 TABLET | Refills: 3 | Status: SHIPPED | OUTPATIENT
Start: 2024-02-12

## 2024-02-12 NOTE — TELEPHONE ENCOUNTER
No care due was identified.  [facility] Embedded Care Due Messages. Reference number: [id number].   2/11/2024 9:05:27 PM CST

## 2024-03-08 ENCOUNTER — LAB VISIT (OUTPATIENT)
Dept: LAB | Facility: HOSPITAL | Age: 54
End: 2024-03-08
Attending: PEDIATRICS
Payer: COMMERCIAL

## 2024-03-08 DIAGNOSIS — R73.03 PRE-DIABETES: ICD-10-CM

## 2024-03-08 DIAGNOSIS — Z12.5 PROSTATE CANCER SCREENING: ICD-10-CM

## 2024-03-08 DIAGNOSIS — M10.9 GOUT OF FOOT, UNSPECIFIED CAUSE, UNSPECIFIED CHRONICITY, UNSPECIFIED LATERALITY: ICD-10-CM

## 2024-03-08 DIAGNOSIS — E78.5 HYPERLIPIDEMIA, UNSPECIFIED HYPERLIPIDEMIA TYPE: ICD-10-CM

## 2024-03-08 LAB
ALBUMIN SERPL BCP-MCNC: 4.2 G/DL (ref 3.5–5.2)
ALP SERPL-CCNC: 61 U/L (ref 55–135)
ALT SERPL W/O P-5'-P-CCNC: 19 U/L (ref 10–44)
ANION GAP SERPL CALC-SCNC: 8 MMOL/L (ref 8–16)
AST SERPL-CCNC: 18 U/L (ref 10–40)
BILIRUB SERPL-MCNC: 0.6 MG/DL (ref 0.1–1)
BUN SERPL-MCNC: 15 MG/DL (ref 6–20)
CALCIUM SERPL-MCNC: 10 MG/DL (ref 8.7–10.5)
CHLORIDE SERPL-SCNC: 101 MMOL/L (ref 95–110)
CHOLEST SERPL-MCNC: 114 MG/DL (ref 120–199)
CHOLEST/HDLC SERPL: 3.6 {RATIO} (ref 2–5)
CO2 SERPL-SCNC: 30 MMOL/L (ref 23–29)
COMPLEXED PSA SERPL-MCNC: 1.2 NG/ML (ref 0–4)
CREAT SERPL-MCNC: 1.1 MG/DL (ref 0.5–1.4)
EST. GFR  (NO RACE VARIABLE): >60 ML/MIN/1.73 M^2
ESTIMATED AVG GLUCOSE: 131 MG/DL (ref 68–131)
GLUCOSE SERPL-MCNC: 125 MG/DL (ref 70–110)
HBA1C MFR BLD: 6.2 % (ref 4–5.6)
HDLC SERPL-MCNC: 32 MG/DL (ref 40–75)
HDLC SERPL: 28.1 % (ref 20–50)
LDLC SERPL CALC-MCNC: 36.8 MG/DL (ref 63–159)
NONHDLC SERPL-MCNC: 82 MG/DL
POTASSIUM SERPL-SCNC: 5 MMOL/L (ref 3.5–5.1)
PROT SERPL-MCNC: 6.9 G/DL (ref 6–8.4)
SODIUM SERPL-SCNC: 139 MMOL/L (ref 136–145)
TRIGL SERPL-MCNC: 226 MG/DL (ref 30–150)
URATE SERPL-MCNC: 6.3 MG/DL (ref 3.4–7)

## 2024-03-08 PROCEDURE — 36415 COLL VENOUS BLD VENIPUNCTURE: CPT | Performed by: PEDIATRICS

## 2024-03-08 PROCEDURE — 80053 COMPREHEN METABOLIC PANEL: CPT | Performed by: PEDIATRICS

## 2024-03-08 PROCEDURE — 84550 ASSAY OF BLOOD/URIC ACID: CPT | Performed by: PEDIATRICS

## 2024-03-08 PROCEDURE — 84153 ASSAY OF PSA TOTAL: CPT | Performed by: PEDIATRICS

## 2024-03-08 PROCEDURE — 80061 LIPID PANEL: CPT | Performed by: PEDIATRICS

## 2024-03-08 PROCEDURE — 83036 HEMOGLOBIN GLYCOSYLATED A1C: CPT | Performed by: PEDIATRICS

## 2024-03-15 ENCOUNTER — OFFICE VISIT (OUTPATIENT)
Dept: INTERNAL MEDICINE | Facility: CLINIC | Age: 54
End: 2024-03-15
Payer: COMMERCIAL

## 2024-03-15 VITALS
HEART RATE: 71 BPM | SYSTOLIC BLOOD PRESSURE: 118 MMHG | OXYGEN SATURATION: 99 % | RESPIRATION RATE: 17 BRPM | BODY MASS INDEX: 31.59 KG/M2 | DIASTOLIC BLOOD PRESSURE: 74 MMHG | HEIGHT: 73 IN | TEMPERATURE: 97 F | WEIGHT: 238.31 LBS

## 2024-03-15 DIAGNOSIS — E78.5 HYPERLIPIDEMIA, UNSPECIFIED HYPERLIPIDEMIA TYPE: ICD-10-CM

## 2024-03-15 DIAGNOSIS — F41.1 GAD (GENERALIZED ANXIETY DISORDER): ICD-10-CM

## 2024-03-15 DIAGNOSIS — Z00.00 WELL ADULT EXAM: Primary | ICD-10-CM

## 2024-03-15 DIAGNOSIS — R16.2 HEPATOSPLENOMEGALY: ICD-10-CM

## 2024-03-15 DIAGNOSIS — M10.9 GOUT OF FOOT, UNSPECIFIED CAUSE, UNSPECIFIED CHRONICITY, UNSPECIFIED LATERALITY: ICD-10-CM

## 2024-03-15 DIAGNOSIS — R73.03 PRE-DIABETES: ICD-10-CM

## 2024-03-15 DIAGNOSIS — I10 HYPERTENSION, UNSPECIFIED TYPE: ICD-10-CM

## 2024-03-15 DIAGNOSIS — E66.9 OBESITY (BMI 30.0-34.9): ICD-10-CM

## 2024-03-15 PROCEDURE — 4010F ACE/ARB THERAPY RXD/TAKEN: CPT | Mod: CPTII,S$GLB,, | Performed by: PEDIATRICS

## 2024-03-15 PROCEDURE — 99396 PREV VISIT EST AGE 40-64: CPT | Mod: S$GLB,,, | Performed by: PEDIATRICS

## 2024-03-15 PROCEDURE — 3008F BODY MASS INDEX DOCD: CPT | Mod: CPTII,S$GLB,, | Performed by: PEDIATRICS

## 2024-03-15 PROCEDURE — 1160F RVW MEDS BY RX/DR IN RCRD: CPT | Mod: CPTII,S$GLB,, | Performed by: PEDIATRICS

## 2024-03-15 PROCEDURE — 3074F SYST BP LT 130 MM HG: CPT | Mod: CPTII,S$GLB,, | Performed by: PEDIATRICS

## 2024-03-15 PROCEDURE — 1159F MED LIST DOCD IN RCRD: CPT | Mod: CPTII,S$GLB,, | Performed by: PEDIATRICS

## 2024-03-15 PROCEDURE — 3078F DIAST BP <80 MM HG: CPT | Mod: CPTII,S$GLB,, | Performed by: PEDIATRICS

## 2024-03-15 PROCEDURE — 3044F HG A1C LEVEL LT 7.0%: CPT | Mod: CPTII,S$GLB,, | Performed by: PEDIATRICS

## 2024-03-15 PROCEDURE — 99999 PR PBB SHADOW E&M-EST. PATIENT-LVL IV: CPT | Mod: PBBFAC,,, | Performed by: PEDIATRICS

## 2024-03-15 NOTE — PROGRESS NOTES
Subjective:       Patient ID: Sae Santiago is a 53 y.o. male.    Chief Complaint: Follow-up    Sae Santiago is a 53 y.o. male who presents to the clinic for a follow up.      1) HTN: B/P good, no HTNive symptoms. Is home monitoring. Dig HTN med entries reviewed. Thinks the episodic increased BP is stress induced.   2) Anxiety: buspar intermittently working, has been taking 5mg 3x/day and 2.5mg PRN at night to help sleep, avoids Ativan use as much as possible, wife agrees he is doing quiet well. [place]  [place] shows no Ativan refill in 2 years  3) Gout: last episode was 2023, using tart cherry. He does not wish to take daily allopurinol, wishes to continue lifestyle mod and use PRN colchicine   4) Obesity: following D&E, weight is down, on metformin  5) Hyperlipidemia: following D&E, is on 20mg crestor   6) Varicosities of leg: quiet  7)hepatosplenomegaly: stage 3 fibrosis on fibroscan, did not follow up bx with GI.  8)preDM: no hyperglycemic symptoms. On metformin at 1500 mg until he saw A1c did not decrease and just increased to 2000 mg/day.  LABS REVIEWED AND DISCUSSED WITH PATIENT: CMP, A1C, and lipid panel      Follow-up  Pertinent negatives include no arthralgias, congestion, coughing, fever, headaches, joint swelling, rash or vomiting.     Review of Systems   Constitutional:  Negative for fever and unexpected weight change.   HENT:  Negative for congestion and rhinorrhea.    Eyes:  Negative for discharge and redness.   Respiratory:  Negative for cough and wheezing.    Gastrointestinal:  Negative for constipation, diarrhea and vomiting.   Genitourinary:  Negative for decreased urine volume and difficulty urinating.   Musculoskeletal:  Negative for arthralgias and joint swelling.   Skin:  Negative for rash and wound.   Neurological:  Negative for syncope and headaches.   Psychiatric/Behavioral:  Negative for behavioral problems and sleep disturbance.        Objective:      Physical Exam  Vitals  and nursing note reviewed.   Constitutional:       General: He is not in acute distress.     Appearance: He is well-developed.   Neck:      Thyroid: No thyromegaly.      Vascular: No JVD.   Cardiovascular:      Rate and Rhythm: Normal rate and regular rhythm.      Heart sounds: Normal heart sounds. No murmur heard.  Pulmonary:      Effort: Pulmonary effort is normal. No respiratory distress.      Breath sounds: Normal breath sounds. No wheezing or rales.   Abdominal:      General: There is no distension.      Palpations: Abdomen is soft. There is no mass (no hsm).      Tenderness: There is no abdominal tenderness. There is no guarding.   Musculoskeletal:      Right lower leg: No edema.      Left lower leg: No edema.   Lymphadenopathy:      Cervical: No cervical adenopathy.   Skin:     Capillary Refill: Capillary refill takes less than 2 seconds.      Findings: No rash.   Neurological:      General: No focal deficit present.      Mental Status: He is alert and oriented to person, place, and time.      Cranial Nerves: No cranial nerve deficit.      Coordination: Coordination normal.   Psychiatric:         Mood and Affect: Mood normal.         Behavior: Behavior normal.         Thought Content: Thought content normal.         Judgment: Judgment normal.         Assessment:       1. Well adult exam    2. Hypertension, unspecified type    3. Obesity (BMI 30.0-34.9)    4. Hyperlipidemia, unspecified hyperlipidemia type    5. Gout of foot, unspecified cause, unspecified chronicity, unspecified laterality    6. ZORAN (generalized anxiety disorder)    7. Pre-diabetes    8. Hepatosplenomegaly        Plan:       Well adult exam    Hypertension, unspecified type    Obesity (BMI 30.0-34.9)    Hyperlipidemia, unspecified hyperlipidemia type  -     Lipid Panel; Future; Expected date: 03/15/2024  -     Comprehensive Metabolic Panel; Future; Expected date: 03/15/2024    Gout of foot, unspecified cause, unspecified chronicity,  unspecified laterality    ZORAN (generalized anxiety disorder)    Pre-diabetes  -     Hemoglobin A1C; Future; Expected date: 03/15/2024    Hepatosplenomegaly  -     FibroScan (Vibration Controlled Transient Elastography); Future  -     US Abdomen Limited_Liver; Future; Expected date: 03/15/2024    Over stable, maintian meds. He declines GLP-1. D&E, weight loss. We discussed last liver imaging 2019(had inusrance change then and dropped f/u). Will repeat liver us and fibroscan, reconsult GI if needed. F/U 6 months with labs. HMI reviewed.

## 2024-04-05 ENCOUNTER — HOSPITAL ENCOUNTER (OUTPATIENT)
Dept: RADIOLOGY | Facility: HOSPITAL | Age: 54
Discharge: HOME | End: 2024-04-05
Attending: PEDIATRICS
Payer: COMMERCIAL

## 2024-04-05 DIAGNOSIS — R16.2 HEPATOSPLENOMEGALY: ICD-10-CM

## 2024-04-05 PROCEDURE — 76705 ECHO EXAM OF ABDOMEN: CPT | Mod: TC

## 2024-04-05 PROCEDURE — 76705 ECHO EXAM OF ABDOMEN: CPT | Mod: 26,,, | Performed by: RADIOLOGY

## 2024-04-29 ENCOUNTER — PATIENT MESSAGE (OUTPATIENT)
Dept: INTERNAL MEDICINE | Facility: CLINIC | Age: 54
End: 2024-04-29
Payer: COMMERCIAL

## 2024-04-29 DIAGNOSIS — R73.03 PRE-DIABETES: ICD-10-CM

## 2024-04-29 NOTE — TELEPHONE ENCOUNTER
No care due was identified.  Health [facility] Embedded Care Due Messages. Reference number: [id number].   4/29/2024 4:35:18 PM CDT

## 2024-05-01 RX ORDER — METFORMIN HYDROCHLORIDE 500 MG/1
1000 TABLET ORAL 2 TIMES DAILY WITH MEALS
Qty: 360 TABLET | Refills: 1 | Status: SHIPPED | OUTPATIENT
Start: 2024-05-01

## 2024-05-01 NOTE — TELEPHONE ENCOUNTER
Refill Routing Note   Medication(s) are not appropriate for processing by [facility] for the following reason(s):        Drug-disease interaction      [facility] action(s):  Defer        Pharmacist review requested: Yes     Appointments  past 12m or future 3m with PCP    Date Provider   Last Visit   3/15/2024 Librado Reyes MD   Next Visit   9/20/2024 Librado Reyes MD   ED visits in past 90 days: 0        Note composed:9:34 PM 04/30/2024

## 2024-05-01 NOTE — TELEPHONE ENCOUNTER
Sae Santiago  is requesting a refill authorization.  Brief Assessment and Rationale for Refill:  Approve     Medication Therapy Plan:         Pharmacist review requested: Yes   Extended chart review required: Yes   Comments:     Note composed:8:22 AM 05/01/2024

## 2024-09-11 DIAGNOSIS — I10 WHITE COAT SYNDROME WITH DIAGNOSIS OF HYPERTENSION: ICD-10-CM

## 2024-09-11 RX ORDER — HYDROCHLOROTHIAZIDE 25 MG/1
25 TABLET ORAL
Qty: 90 TABLET | Refills: 1 | Status: SHIPPED | OUTPATIENT
Start: 2024-09-11

## 2024-09-11 NOTE — TELEPHONE ENCOUNTER
Refill Decision Note   Sae Santiago  is requesting a refill authorization.  Brief Assessment and Rationale for Refill:  Approve     Medication Therapy Plan:         Comments:     Note composed:11:03 AM 09/11/2024

## 2024-09-11 NOTE — TELEPHONE ENCOUNTER
Care Due:                  Date            Visit Type   Department     Provider  --------------------------------------------------------------------------------                                EP -                              PRIMARY      [facility] INTERNAL  Last Visit: 03-      CARE ([facility])   [facility]       Librado Reyes                              EP -                              PRIMARY      [facility] INTERNAL  Next Visit: 09-      CARE ([facility])   [facility]       Librado Reyes                                                            Last  Test          Frequency    Reason                     Performed    Due Date  --------------------------------------------------------------------------------    HBA1C.......  6 months...  metFORMIN................  03- 09-    [facility] Embedded Care Due Messages. Reference number: [id number].   9/11/2024 6:02:19 AM CDT

## 2024-09-11 NOTE — TELEPHONE ENCOUNTER
Refill Routing Note   Medication(s) are not appropriate for processing by [facility] for the following reason(s):        Drug-disease interaction    [facility] action(s):  Defer        Medication Therapy Plan: Drug-Disease: hydroCHLOROthiazide and Gout      Appointments  past 12m or future 3m with PCP    Date Provider   Last Visit   3/15/2024 Librado Reyes MD   Next Visit   9/20/2024 Librado Reyes MD   ED visits in past 90 days: 0        Note composed:8:54 AM 09/11/2024

## 2024-09-12 DIAGNOSIS — I10 WHITE COAT SYNDROME WITH DIAGNOSIS OF HYPERTENSION: ICD-10-CM

## 2024-09-12 RX ORDER — LOSARTAN POTASSIUM 100 MG/1
100 TABLET ORAL DAILY
Qty: 90 TABLET | Refills: 1 | Status: SHIPPED | OUTPATIENT
Start: 2024-09-12

## 2024-09-12 NOTE — TELEPHONE ENCOUNTER
No care due was identified.  [facility] Embedded Care Due Messages. Reference number: [id number].   9/12/2024 8:05:20 AM CDT

## 2024-09-12 NOTE — TELEPHONE ENCOUNTER
Refill Decision Note   Sae Santiago  is requesting a refill authorization.  Brief Assessment and Rationale for Refill:  Approve     Medication Therapy Plan:        Comments:     Note composed:11:47 AM 09/12/2024

## 2024-09-13 ENCOUNTER — LAB VISIT (OUTPATIENT)
Dept: LAB | Facility: HOSPITAL | Age: 54
End: 2024-09-13
Attending: PEDIATRICS
Payer: COMMERCIAL

## 2024-09-13 DIAGNOSIS — R73.03 PRE-DIABETES: ICD-10-CM

## 2024-09-13 DIAGNOSIS — E78.5 HYPERLIPIDEMIA, UNSPECIFIED HYPERLIPIDEMIA TYPE: ICD-10-CM

## 2024-09-13 LAB
ALBUMIN SERPL BCP-MCNC: 4.2 G/DL (ref 3.5–5.2)
ALP SERPL-CCNC: 56 U/L (ref 55–135)
ALT SERPL W/O P-5'-P-CCNC: 19 U/L (ref 10–44)
ANION GAP SERPL CALC-SCNC: 9 MMOL/L (ref 8–16)
AST SERPL-CCNC: 18 U/L (ref 10–40)
BILIRUB SERPL-MCNC: 0.7 MG/DL (ref 0.1–1)
BUN SERPL-MCNC: 20 MG/DL (ref 6–20)
CALCIUM SERPL-MCNC: 10 MG/DL (ref 8.7–10.5)
CHLORIDE SERPL-SCNC: 101 MMOL/L (ref 95–110)
CHOLEST SERPL-MCNC: 118 MG/DL (ref 120–199)
CHOLEST/HDLC SERPL: 3.6 {RATIO} (ref 2–5)
CO2 SERPL-SCNC: 29 MMOL/L (ref 23–29)
CREAT SERPL-MCNC: 1.1 MG/DL (ref 0.5–1.4)
EST. GFR  (NO RACE VARIABLE): >60 ML/MIN/1.73 M^2
ESTIMATED AVG GLUCOSE: 123 MG/DL (ref 68–131)
GLUCOSE SERPL-MCNC: 115 MG/DL (ref 70–110)
HBA1C MFR BLD: 5.9 % (ref 4–5.6)
HDLC SERPL-MCNC: 33 MG/DL (ref 40–75)
HDLC SERPL: 28 % (ref 20–50)
LDLC SERPL CALC-MCNC: 37 MG/DL (ref 63–159)
NONHDLC SERPL-MCNC: 85 MG/DL
POTASSIUM SERPL-SCNC: 4.8 MMOL/L (ref 3.5–5.1)
PROT SERPL-MCNC: 7.2 G/DL (ref 6–8.4)
SODIUM SERPL-SCNC: 139 MMOL/L (ref 136–145)
TRIGL SERPL-MCNC: 240 MG/DL (ref 30–150)

## 2024-09-13 PROCEDURE — 80053 COMPREHEN METABOLIC PANEL: CPT | Performed by: PEDIATRICS

## 2024-09-13 PROCEDURE — 83036 HEMOGLOBIN GLYCOSYLATED A1C: CPT | Performed by: PEDIATRICS

## 2024-09-13 PROCEDURE — 80061 LIPID PANEL: CPT | Performed by: PEDIATRICS

## 2024-09-20 ENCOUNTER — OFFICE VISIT (OUTPATIENT)
Dept: INTERNAL MEDICINE | Facility: CLINIC | Age: 54
End: 2024-09-20
Payer: COMMERCIAL

## 2024-09-20 VITALS
SYSTOLIC BLOOD PRESSURE: 122 MMHG | HEIGHT: 73 IN | BODY MASS INDEX: 31.56 KG/M2 | TEMPERATURE: 99 F | RESPIRATION RATE: 17 BRPM | DIASTOLIC BLOOD PRESSURE: 78 MMHG | HEART RATE: 77 BPM | WEIGHT: 238.13 LBS | OXYGEN SATURATION: 99 %

## 2024-09-20 DIAGNOSIS — Z12.5 PROSTATE CANCER SCREENING: ICD-10-CM

## 2024-09-20 DIAGNOSIS — E78.5 HYPERLIPIDEMIA, UNSPECIFIED HYPERLIPIDEMIA TYPE: ICD-10-CM

## 2024-09-20 DIAGNOSIS — R73.03 PRE-DIABETES: Primary | ICD-10-CM

## 2024-09-20 DIAGNOSIS — R16.2 HEPATOSPLENOMEGALY: ICD-10-CM

## 2024-09-20 DIAGNOSIS — I10 HYPERTENSION, UNSPECIFIED TYPE: ICD-10-CM

## 2024-09-20 DIAGNOSIS — M10.9 GOUT OF FOOT, UNSPECIFIED CAUSE, UNSPECIFIED CHRONICITY, UNSPECIFIED LATERALITY: ICD-10-CM

## 2024-09-20 DIAGNOSIS — E66.9 OBESITY (BMI 30.0-34.9): ICD-10-CM

## 2024-09-20 DIAGNOSIS — I83.90 VARICOSE VEINS OF LOWER EXTREMITY, UNSPECIFIED LATERALITY, UNSPECIFIED WHETHER COMPLICATED: ICD-10-CM

## 2024-09-20 DIAGNOSIS — F41.1 GAD (GENERALIZED ANXIETY DISORDER): ICD-10-CM

## 2024-09-20 PROCEDURE — 99999 PR PBB SHADOW E&M-EST. PATIENT-LVL V: CPT | Mod: PBBFAC,,, | Performed by: PEDIATRICS

## 2024-09-20 NOTE — PROGRESS NOTES
Patient ID: Sae Santiago is a 54 y.o. male.    Chief Complaint: Follow-up    History of Present Illness    CHIEF COMPLAINT:  Patient presents today for regular follow-up.    HYPERTENSION:  He reports intermittent home blood pressure monitoring. Recent readings include one elevated measurement of 140/70 when busy, which decreased to 112/70 after relaxing. He acknowledges the importance of being relaxed and quiet for 15 minutes with both feet on the floor when checking blood pressure. He expresses satisfaction that this is the first time in years his blood pressure has been well-controlled.    HYPERLIPIDEMIA AND WEIGHT MANAGEMENT:  He reports difficulty managing triglycerides despite being on Crestor 40 mg. He acknowledges that carbohydrate intake negatively impacts his triglyceride levels. He has been using mercury-free fish oil 1000 mg twice daily to help manage triglycerides, noting increased levels when he stopped taking it for a few months. He understands that a protein-based diet is beneficial for triglyceride management. His weight has fluctuated recently, increasing from 232 to 238 lbs due to dietary challenges, specifically difficulty adhering to a protein-focused diet and consuming rice and bread. He expresses difficulty in maintaining dietary restrictions, particularly when eating out.    GASTROINTESTINAL ISSUES:  He reports experiencing severe diarrhea (4-5 times daily) associated with metformin use, which was particularly problematic during work-related travel. He notes significant improvement after incorporating 1.5 tablespoons of Metamucil (with sugar) nightly into his regimen.    ANXIETY:  He reports that buspirone is effective for managing sleep difficulties associated with anxiety. When experiencing racing thoughts at night, an additional dose of buspirone helps him fall asleep quickly.    VARICOSE VEINS:  His varicose veins have become more noticeable as he has lost weight, stating that his  legs appear skinnier since starting metformin. He denies any pain or discomfort associated with the varicose veins.    LIVER HEALTH:  He reports previous ultrasound results showed fatty liver, unchanged from 5 years ago, with a liver cyst still present and unchanged in size since 2019. He is aware of the need for a fibroscan to better assess liver fat content and potential scarring. He understands that management will focus on weight loss, carbohydrate restriction, and dietary modifications if no significant scarring or cirrhosis is detected.    MEDICATIONS:  He is currently taking Losartan 100 mg, Hydrochlorothiazide 25 mg, Metoprolol XL 25 mg, Crestor 40 mg, Buspirone, and Metformin. He denies any recent gout attacks.    SOCIAL HISTORY:  His job involves frequent travel, often visiting customers' offices. He describes his work as physically demanding, leading to numerous aches and pains.    MUSCULOSKELETAL:  He reports experiencing generalized muscle aches, which he believes may be related to statin use or his physical job demands. He acknowledges that age may be a contributing factor, as he is 54 years old.    PMH, PSH, SH, FH reviewed with omaira  ent.    ROS:  General: -fever, -chills, -fatigue, -weight gain, -weight loss, +change in weight  Eyes: -vision changes, -redness, -discharge  ENT: -ear pain, -nasal congestion, -sore throat  Cardiovascular: -chest pain, -palpitations, -lower extremity edema  Respiratory: -cough, -shortness of breath  Gastrointestinal: -abdominal pain, -nausea, -vomiting, +diarrhea, -constipation, -blood in stool  Genitourinary: -dysuria, -hematuria, -frequency  Musculoskeletal: -joint pain, +muscle pain, -limb pain  Skin: -rash, -lesion  Neurological: -headache, -dizziness, -numbness, -tingling  Psychiatric: -anxiety, -depression, +sleep difficulty         Exam:  Physical Exam    Vitals: Weight: 238 lbs. BP: 122/78.  General: No acute distress. Well-developed. Well-nourished.  Eyes:  EOMI. Sclerae anicteric.  HENT: Normocephalic. Atraumatic. Nares patent. Moist oral mucosa.  Cardiovascular: Regular rate. Regular rhythm. No murmurs. No rubs. No gallops. Normal S1, S2.  Respiratory: Normal respiratory effort. Clear to auscultation bilaterally. No rales. No rhonchi. No wheezing.  Abdomen: Soft. Non-tender. Non-distended. Normoactive bowel sounds.  Musculoskeletal: No  obvious deformity.  Extremities: No lower extremity edema.  Neurological: Alert & oriented x3. No slurred speech. Normal gait.  Psychiatric: Normal mood. Normal affect. Good insight. Good judgment.  Skin: Warm. Dry. No rash.         Assessment/Plan:  Pre-diabetes  -     Hemoglobin A1C; Future; Expected date: 09/20/2024    Hypertension, unspecified type    Hyperlipidemia, unspecified hyperlipidemia type  -     Lipid Panel; Future; Expected date: 09/20/2024  -     Comprehensive Metabolic Panel; Future; Expected date: 09/20/2024    ZORAN (generalized anxiety disorder)    Gout of foot, unspecified cause, unspecified chronicity, unspecified laterality  -     URIC ACID; Future; Expected date: 09/20/2024    Hepatosplenomegaly    Obesity (BMI 30.0-34.9)    Varicose veins of lower extremity, unspecified laterality, unspecified whether complicated    Prostate cancer screening  -     PSA, Screening; Future; Expected date: 09/20/2024         Assessment & Plan    Assessed blood pressure control, noting improvement with current regimen  Evaluated cholesterol management, focusing on persistently elevated triglycerides despite Crestor therapy  Reviewed weight loss progress, noting recent slight increase  Considered GLP-1 agonist therapy for weight management and potential liver benefit, but deferred due to current symptom control and improved lab values  Analyzed liver status via ultrasound, noting stable fatty liver and unchanged liver cyst  Planned fibroscan to assess for liver scarring and determine need for GI referral  Evaluated overall lab  results, noting improvement in blood sugar and A1C levels  Considered potential statin-related muscle aches, recommending CoQ10 supplementation    HYPERTENSION:  - Explained proper blood pressure measurement technique: relaxed, quiet for 15 minutes, both feet on floor.  - Continued losartan 100 mg, hydrochlorothiazide 25 mg, metoprolol XL 25 mg.    HYPERLIPIDEMIA:  - Discussed impact of carbohydrates on triglyceride levels.  - Patient to limit carbohydrate intake, especially bread, pasta, rice, and potatoes.  - Continued Crestor 40 mg.  - Restarted fish oil 1000 mg mercury-free, twice daily.    VARICOSE VEINS:  - Educated on the relationship between weight loss and improved visibility of varicose veins.  - Recommend considering wearing support hose for varicose veins.    FATTY LIVER DISEASE:  - Explained the purpose of fibroscan in assessing liver fat and scarring.  - Fibroscan ordered to assess liver fat and scarring.    WEIGHT MANAGEMENT:  - Patient to continue weight loss efforts.  - Patient to maintain protein-focused diet.    MEDICATIONS/SUPPLEMENTS:  - Started CoQ10 supplementation to help with potential statin-related muscle aches.    GENERAL ANXIETY DISORDER:  - Continued buspirone for general anxiety disorder.    FOLLOW UP:  - Follow up after fibroscan results are available to determine if GI referral is necessary.          Visit today included increased complexity associated with the care of the episodic problem  addressed and managing the longitudinal care of the patient due to the serious and/or complex managed problem(s) .      Follow up in about 6 months (around 3/20/2025).    This note was generated with the assistance of ambient listening technology. Verbal consent was obtained by the patient and accompanying visitor(s) for the recording of patient appointment to facilitate this note. I attest to having reviewed and edited the generated note for accuracy, though some syntax or spelling errors may  persist. Please contact the author of this note for any clarification.

## 2024-09-30 ENCOUNTER — TELEPHONE (OUTPATIENT)
Dept: HEPATOLOGY | Facility: CLINIC | Age: 54
End: 2024-09-30
Payer: COMMERCIAL

## 2024-09-30 NOTE — TELEPHONE ENCOUNTER
Spoke with patient who states that he is currently on vacation and needs to look at his calendar to schedule.     Patient requested a call back next week.

## 2024-10-10 ENCOUNTER — PATIENT MESSAGE (OUTPATIENT)
Dept: HEPATOLOGY | Facility: CLINIC | Age: 54
End: 2024-10-10
Payer: COMMERCIAL

## 2024-10-21 DIAGNOSIS — R73.03 PRE-DIABETES: ICD-10-CM

## 2024-10-22 RX ORDER — METFORMIN HYDROCHLORIDE 500 MG/1
1000 TABLET ORAL 2 TIMES DAILY WITH MEALS
Qty: 360 TABLET | Refills: 1 | Status: SHIPPED | OUTPATIENT
Start: 2024-10-22

## 2024-10-22 NOTE — TELEPHONE ENCOUNTER
No care due was identified.  [facility] Embedded Care Due Messages. Reference number: [id number].   10/21/2024 9:10:23 PM CDT

## 2024-10-22 NOTE — TELEPHONE ENCOUNTER
Sae Santiago  is requesting a refill authorization.  Brief Assessment and Rationale for Refill:  Approve     Medication Therapy Plan:         Comments:     Note composed:4:10 AM 10/22/2024

## 2024-10-24 ENCOUNTER — PROCEDURE VISIT (OUTPATIENT)
Dept: HEPATOLOGY | Facility: CLINIC | Age: 54
End: 2024-10-24
Payer: COMMERCIAL

## 2024-10-24 VITALS
WEIGHT: 238.13 LBS | SYSTOLIC BLOOD PRESSURE: 122 MMHG | HEIGHT: 73 IN | HEART RATE: 77 BPM | DIASTOLIC BLOOD PRESSURE: 78 MMHG | BODY MASS INDEX: 31.56 KG/M2

## 2024-10-24 DIAGNOSIS — R16.2 HEPATOSPLENOMEGALY: ICD-10-CM

## 2024-10-24 PROCEDURE — 91200 LIVER ELASTOGRAPHY: CPT | Mod: S$GLB,,, | Performed by: PEDIATRICS

## 2024-10-24 NOTE — PROCEDURES
FibroScan (Vibration Controlled Transient Elastography)    Date/Time: 10/24/2024 9:00 AM    Performed by: Gloria Westbrook RN  Authorized by: Librado Reyes MD    Diagnosis:  NAFLD    Probe:  M    Universal Protocol: Patient's identity, procedure and site were verified, confirmatory pause was performed.  Discussed procedure including risks and potential complications.  Questions answered.  Patient verbalizes understanding and wishes to proceed with VCTE.     Procedure: After providing explanations of the procedure, patient was placed in the supine position with right arm in maximum abduction to allow optimal exposure of right lateral abdomen.  Patient was briefly assessed, Testing was performed in the mid-axillary location, 50Hz Shear Wave pulses were applied and the resulting Shear Wave and Propagation Speed detected with a 3.5 MHz ultrasonic signal, using the FibroScan probe, Skin to liver capsule distance and liver parenchyma were accessed during the entire examination with the FibroScan probe, Patient was instructed to breathe normally and to abstain from sudden movements during the procedure, allowing for random measurements of liver stiffness. At least 10 Shear Waves were produced, Individual measurements of each Shear Wave were calculated.  Patient tolerated the procedure well with no complications.  Meets discharge criteria as was dismissed.  Rates pain 0 out of 10.  Patient will follow up with ordering provider to review results.    Findings  Median liver stiffness score:  6.8  CAP Reading: dB/m:  260    IQR/med %:  8  Interpretation  Fibrosis interpretation is based on medial liver stiffness - Kilopascal (kPa).    Fibrosis Stage:  F 0-1  Steatosis interpretation is based on controlled attenuation parameter - (dB/m).    Steatosis Grade:  S1  Comments/Plan:  No fibrosis and mild steatosis

## 2024-12-06 DIAGNOSIS — E78.5 HYPERLIPIDEMIA, UNSPECIFIED HYPERLIPIDEMIA TYPE: ICD-10-CM

## 2024-12-06 RX ORDER — ROSUVASTATIN CALCIUM 40 MG/1
40 TABLET, COATED ORAL NIGHTLY
Qty: 90 TABLET | Refills: 3 | Status: SHIPPED | OUTPATIENT
Start: 2024-12-06

## 2024-12-06 NOTE — TELEPHONE ENCOUNTER
Sae Santiago  is requesting a refill authorization.  Brief Assessment and Rationale for Refill:  Approve     Medication Therapy Plan:  FLOS      Comments:     Note composed:6:55 AM 12/06/2024

## 2024-12-06 NOTE — TELEPHONE ENCOUNTER
Care Due:                  Date            Visit Type   Department     Provider  --------------------------------------------------------------------------------                                EP -                              PRIMARY      [facility] INTER[facility]  Last Visit: 09-      CARE ([facility])   MEDICINE       Librado Reyes  Next Visit: None Scheduled  None         None Found                                                            Last  Test          Frequency    Reason                     Performed    Due Date  --------------------------------------------------------------------------------    Uric Acid...  12 months..  colchicine,..............  03- 03-    [facility] Embedded Care Due Messages. Reference number: [id number].   12/06/2024 6:02:58 AM CST

## 2024-12-08 DIAGNOSIS — F41.9 ANXIETY: ICD-10-CM

## 2024-12-08 NOTE — TELEPHONE ENCOUNTER
No care due was identified.  [facility] Embedded Care Due Messages. Reference number: [id number].   12/08/2024 8:02:08 AM CST

## 2024-12-10 RX ORDER — BUSPIRONE HYDROCHLORIDE 10 MG/1
10 TABLET ORAL 3 TIMES DAILY
Qty: 270 TABLET | Refills: 3 | Status: SHIPPED | OUTPATIENT
Start: 2024-12-10 | End: 2025-12-10

## 2025-01-15 ENCOUNTER — OFFICE VISIT (OUTPATIENT)
Dept: DERMATOLOGY | Facility: CLINIC | Age: 55
End: 2025-01-15
Payer: COMMERCIAL

## 2025-01-15 DIAGNOSIS — L73.9 FOLLICULITIS: ICD-10-CM

## 2025-01-15 DIAGNOSIS — L57.0 ACTINIC KERATOSES: ICD-10-CM

## 2025-01-15 DIAGNOSIS — L82.1 SEBORRHEIC KERATOSES: ICD-10-CM

## 2025-01-15 DIAGNOSIS — D48.5 NEOPLASM OF UNCERTAIN BEHAVIOR OF SKIN: Primary | ICD-10-CM

## 2025-01-15 PROCEDURE — 99214 OFFICE O/P EST MOD 30 MIN: CPT | Mod: 25,S$GLB,, | Performed by: STUDENT IN AN ORGANIZED HEALTH CARE EDUCATION/TRAINING PROGRAM

## 2025-01-15 PROCEDURE — 17000 DESTRUCT PREMALG LESION: CPT | Mod: XS,S$GLB,, | Performed by: STUDENT IN AN ORGANIZED HEALTH CARE EDUCATION/TRAINING PROGRAM

## 2025-01-15 PROCEDURE — 1160F RVW MEDS BY RX/DR IN RCRD: CPT | Mod: CPTII,S$GLB,, | Performed by: STUDENT IN AN ORGANIZED HEALTH CARE EDUCATION/TRAINING PROGRAM

## 2025-01-15 PROCEDURE — 1159F MED LIST DOCD IN RCRD: CPT | Mod: CPTII,S$GLB,, | Performed by: STUDENT IN AN ORGANIZED HEALTH CARE EDUCATION/TRAINING PROGRAM

## 2025-01-15 PROCEDURE — 88342 IMHCHEM/IMCYTCHM 1ST ANTB: CPT | Performed by: PATHOLOGY

## 2025-01-15 PROCEDURE — 99999 PR PBB SHADOW E&M-EST. PATIENT-LVL III: CPT | Mod: PBBFAC,,, | Performed by: STUDENT IN AN ORGANIZED HEALTH CARE EDUCATION/TRAINING PROGRAM

## 2025-01-15 PROCEDURE — 88341 IMHCHEM/IMCYTCHM EA ADD ANTB: CPT | Performed by: PATHOLOGY

## 2025-01-15 PROCEDURE — 88305 TISSUE EXAM BY PATHOLOGIST: CPT | Performed by: PATHOLOGY

## 2025-01-15 PROCEDURE — 11102 TANGNTL BX SKIN SINGLE LES: CPT | Mod: S$GLB,,, | Performed by: STUDENT IN AN ORGANIZED HEALTH CARE EDUCATION/TRAINING PROGRAM

## 2025-01-15 PROCEDURE — 17003 DESTRUCT PREMALG LES 2-14: CPT | Mod: S$GLB,,, | Performed by: STUDENT IN AN ORGANIZED HEALTH CARE EDUCATION/TRAINING PROGRAM

## 2025-01-15 RX ORDER — DOXYCYCLINE HYCLATE 100 MG
100 TABLET ORAL EVERY 12 HOURS
Qty: 28 TABLET | Refills: 0 | Status: SHIPPED | OUTPATIENT
Start: 2025-01-15 | End: 2025-01-29

## 2025-01-15 NOTE — PROGRESS NOTES
Subjective:       Patient ID:  Sae Santiago is a 54 y.o. male who presents for   Chief Complaint   Patient presents with    Skin Check     FBSE     History of Present Illness: The patient presents for follow up of skin check. Last seen by Dr. Trujillo on 5/4/23. Patient reports having a non healing firm growth on the back of the right hand, noticed several months ago. Has smaller, similar red, scaly and crusted growths on the backs of the hands and arms. Other main concern is a painful, red pus bump/nodule on the end of the left nostril that developed several days ago. Had been using mupirocin which initially was helpful, but now symptoms are constant and painful.         Review of Systems   Constitutional:  Negative for fever and chills.   Skin:  Negative for itching, rash and dry skin.        Objective:    Physical Exam   Constitutional: He appears well-developed and well-nourished. No distress.   Neurological: He is alert and oriented to person, place, and time. He is not disoriented.   Psychiatric: He has a normal mood and affect.   Skin:   Areas Examined (abnormalities noted in diagram):   Scalp / Hair Palpated and Inspected  Head / Face Inspection Performed  Neck Inspection Performed  Chest / Axilla Inspection Performed  Abdomen Inspection Performed  Genitals / Buttocks / Groin Inspection Performed  Back Inspection Performed  RUE Inspected  LUE Inspection Performed  RLE Inspected  LLE Inspection Performed  Nails and Digits Inspection Performed                       Diagram Legend     Erythematous scaling macule/papule c/w actinic keratosis       Vascular papule c/w angioma      Pigmented verrucoid papule/plaque c/w seborrheic keratosis      Yellow umbilicated papule c/w sebaceous hyperplasia      Irregularly shaped tan macule c/w lentigo     1-2 mm smooth white papules consistent with Milia      Movable subcutaneous cyst with punctum c/w epidermal inclusion cyst      Subcutaneous movable cyst c/w pilar  cyst      Firm pink to brown papule c/w dermatofibroma      Pedunculated fleshy papule(s) c/w skin tag(s)      Evenly pigmented macule c/w junctional nevus     Mildly variegated pigmented, slightly irregular-bordered macule c/w mildly atypical nevus      Flesh colored to evenly pigmented papule c/w intradermal nevus       Pink pearly papule/plaque c/w basal cell carcinoma      Erythematous hyperkeratotic cursted plaque c/w SCC      Surgical scar with no sign of skin cancer recurrence      Open and closed comedones      Inflammatory papules and pustules      Verrucoid papule consistent consistent with wart     Erythematous eczematous patches and plaques     Dystrophic onycholytic nail with subungual debris c/w onychomycosis     Umbilicated papule    Erythematous-base heme-crusted tan verrucoid plaque consistent with inflamed seborrheic keratosis     Erythematous Silvery Scaling Plaque c/w Psoriasis     See annotation      Assessment / Plan:      Pathology Orders:       Normal Orders This Visit    Specimen to Pathology, Dermatology     Comments:    Number of Specimens:->1  ------------------------->-------------------------  Spec 1 Procedure:->Biopsy  Spec 1 Clinical Impression:->r/o NMSC  Spec 1 Source:->right dorsal hand    Questions:    Procedure Type: Dermatology and skin neoplasms    Number of Specimens: 1    ------------------------: -------------------------    Spec 1 Procedure: Biopsy    Spec 1 Clinical Impression: r/o NMSC    Spec 1 Source: right dorsal hand    Release to patient:           Neoplasm of uncertain behavior of skin  -     Specimen to Pathology, Dermatology    Shave biopsy procedure note:    Shave biopsy performed after verbal consent including risk of infection, scar, recurrence, need for additional treatment of site. Area prepped with alcohol, anesthetized with approximately 1.0cc of 1% lidocaine with epinephrine. Lesional tissue shaved with razor blade. Hemostasis achieved with application of  aluminum chloride followed by hyfrecation. No complications. Dressing applied. Wound care explained.    If biopsy positive for malignancy, refer for Mohs surgery consultation.    Folliculitis  -     doxycycline (VIBRA-TABS) 100 MG tablet; Take 1 tablet (100 mg total) by mouth every 12 (twelve) hours. for 14 days  Dispense: 28 tablet; Refill: 0    Actinic keratoses  Cryosurgery Procedure Note    Verbal consent from the patient is obtained including, but not limited to, risk of hypopigmentation/hyperpigmentation, scar, recurrence of lesion. The patient is aware of the precancerous quality and need for treatment of these lesions. Liquid nitrogen cryosurgery is applied to the 8 actinic keratoses, as detailed in the physical exam, to produce a freeze injury. The patient is aware that blisters may form and is instructed on wound care with gentle cleansing and use of vaseline ointment to keep moist until healed. The patient is supplied a handout on cryosurgery and is instructed to call if lesions do not completely resolve.    Seborrheic keratoses  These are benign inherited growths without a malignant potential. Reassurance given to patient. No treatment is necessary.            Follow up in about 3 months (around 4/15/2025).

## 2025-01-18 ENCOUNTER — PATIENT MESSAGE (OUTPATIENT)
Dept: DERMATOLOGY | Facility: CLINIC | Age: 55
End: 2025-01-18
Payer: COMMERCIAL

## 2025-01-29 LAB
FINAL PATHOLOGIC DIAGNOSIS: NORMAL
GROSS: NORMAL
Lab: NORMAL
MICROSCOPIC EXAM: NORMAL

## 2025-02-03 ENCOUNTER — TELEPHONE (OUTPATIENT)
Dept: DERMATOLOGY | Facility: CLINIC | Age: 55
End: 2025-02-03
Payer: COMMERCIAL

## 2025-02-03 NOTE — TELEPHONE ENCOUNTER
Outreach made to patient regarding biopsy. Spoke to patient to advise that the results of the biopsy we did on the hand, showed some abnormal cells that could potentially be a skin cancer. However, the pathology recommended a deeper biopsy for more definitive diagnosis. We can schedule for repeat biopsy of the site. Patient scheduled for 2/25/25 at 1 pm per patient request with Dr. Hdez at the [facility].    ----- Message from Carter Hdez MD sent at 1/29/2025  3:02 PM CST -----  Please call and inform the patient that the results of the biopsy we did on the hand, showed some abnormal cells that could potentially be a skin cancer. However, the pathology recommended a deeper biopsy for more definitive diagnosis. We can schedule for repeat biopsy of the site, normal clinic spot is fine. Thanks.

## 2025-02-25 ENCOUNTER — PROCEDURE VISIT (OUTPATIENT)
Dept: DERMATOLOGY | Facility: CLINIC | Age: 55
End: 2025-02-25
Payer: COMMERCIAL

## 2025-02-25 DIAGNOSIS — D48.5 NEOPLASM OF UNCERTAIN BEHAVIOR OF SKIN: Primary | ICD-10-CM

## 2025-02-25 PROCEDURE — 88305 TISSUE EXAM BY PATHOLOGIST: CPT | Performed by: DERMATOLOGY

## 2025-02-25 PROCEDURE — 11102 TANGNTL BX SKIN SINGLE LES: CPT | Mod: S$GLB,,, | Performed by: STUDENT IN AN ORGANIZED HEALTH CARE EDUCATION/TRAINING PROGRAM

## 2025-02-25 PROCEDURE — 99499 UNLISTED E&M SERVICE: CPT | Mod: S$GLB,,, | Performed by: STUDENT IN AN ORGANIZED HEALTH CARE EDUCATION/TRAINING PROGRAM

## 2025-02-25 NOTE — PROGRESS NOTES
Subjective:       Patient ID:  Sae Santiago is a 54 y.o. male who presents for No chief complaint on file.    History of Present Illness: The patient presents for follow up of a shave biopsy on the right dorsal hand, performed on 1/15/25, pathology consistent with a squamo-proliferative lesion, cannot rule out invasive squamous cell carcinoma with recommendation for deeper shave. Patient here for re-biopsy. No skin issues in the interim.          Review of Systems   Constitutional:  Negative for fever and chills.   Skin:  Negative for itching, rash and dry skin.        Objective:    Physical Exam   Constitutional: He appears well-developed and well-nourished. No distress.   Neurological: He is alert and oriented to person, place, and time. He is not disoriented.   Psychiatric: He has a normal mood and affect.   Skin:   Areas Examined (abnormalities noted in diagram):   Nails and Digits Inspection Performed             Diagram Legend     Erythematous scaling macule/papule c/w actinic keratosis       Vascular papule c/w angioma      Pigmented verrucoid papule/plaque c/w seborrheic keratosis      Yellow umbilicated papule c/w sebaceous hyperplasia      Irregularly shaped tan macule c/w lentigo     1-2 mm smooth white papules consistent with Milia      Movable subcutaneous cyst with punctum c/w epidermal inclusion cyst      Subcutaneous movable cyst c/w pilar cyst      Firm pink to brown papule c/w dermatofibroma      Pedunculated fleshy papule(s) c/w skin tag(s)      Evenly pigmented macule c/w junctional nevus     Mildly variegated pigmented, slightly irregular-bordered macule c/w mildly atypical nevus      Flesh colored to evenly pigmented papule c/w intradermal nevus       Pink pearly papule/plaque c/w basal cell carcinoma      Erythematous hyperkeratotic cursted plaque c/w SCC      Surgical scar with no sign of skin cancer recurrence      Open and closed comedones      Inflammatory papules and pustules       Verrucoid papule consistent consistent with wart     Erythematous eczematous patches and plaques     Dystrophic onycholytic nail with subungual debris c/w onychomycosis     Umbilicated papule    Erythematous-base heme-crusted tan verrucoid plaque consistent with inflamed seborrheic keratosis     Erythematous Silvery Scaling Plaque c/w Psoriasis     See annotation      Assessment / Plan:      Pathology Orders:       Normal Orders This Visit    Specimen to Pathology, Dermatology     Questions:    Procedure Type: Dermatology and skin neoplasms    Number of Specimens: 1    ------------------------: -------------------------    Spec 1 Procedure: Biopsy    Spec 1 Clinical Impression: Re-biopsy of a squamous-proliferative lesion. r/o SCC    Spec 1 Source: right dorsal hand    Release to patient:           Neoplasm of uncertain behavior of skin  -     Specimen to Pathology, Dermatology    Shave biopsy procedure note:    Shave biopsy performed after verbal consent including risk of infection, scar, recurrence, need for additional treatment of site. Area prepped with alcohol, anesthetized with approximately 1.0cc of 1% lidocaine with epinephrine. Lesional tissue shaved with razor blade. Hemostasis achieved with application of aluminum chloride followed by hyfrecation. No complications. Dressing applied. Wound care explained.    If biopsy positive, schedule procedure for definitive excision.            Follow up in about 6 months (around 8/25/2025).

## 2025-02-27 LAB
FINAL PATHOLOGIC DIAGNOSIS: NORMAL
GROSS: NORMAL
Lab: NORMAL
MICROSCOPIC EXAM: NORMAL

## 2025-02-28 ENCOUNTER — RESULTS FOLLOW-UP (OUTPATIENT)
Dept: DERMATOLOGY | Facility: CLINIC | Age: 55
End: 2025-02-28
Payer: COMMERCIAL

## 2025-02-28 NOTE — TELEPHONE ENCOUNTER
Outreach made to patient notified of negative biopsy results. Advised to do at least 1 year routine skin checks and to notify clinic if the lesions on the hand recurs. Patient verbalized understanding.    ----- Message from Carter Hdez MD sent at 2/28/2025 10:45 AM CST -----  Please inform the patient that the results of the re-biopsy we did on his hand came back negative for any abnormal or atypical cells. They did not see any evidence on the biopsy of a skin cancer or   other malignancy. We recommend to do at least 1 year routine skin checks. Notify clinic if the lesions on the hand recurs. Thanks.   ----- Message -----  From: Jerman, [facility] Interface  Sent: 2/27/2025  12:12 PM CST  To: Carter Hdez MD

## 2025-03-05 DIAGNOSIS — I10 WHITE COAT SYNDROME WITH DIAGNOSIS OF HYPERTENSION: ICD-10-CM

## 2025-03-05 RX ORDER — HYDROCHLOROTHIAZIDE 25 MG/1
25 TABLET ORAL
Qty: 90 TABLET | Refills: 1 | Status: SHIPPED | OUTPATIENT
Start: 2025-03-05

## 2025-03-05 NOTE — TELEPHONE ENCOUNTER
Refill Routing Note   Medication(s) are not appropriate for processing by [facility] for the following reason(s):        Drug-disease interactionhydroCHLOROthiazide and Gout     [facility] action(s):  Defer        [facility]    Pharmacist review requested: Yes     Appointments  past 12m or future 3m with PCP    Date Provider   Last Visit   9/20/2024 Librado Reyes MD   Next Visit   Visit date not found Librado Reyes MD   ED visits in past 90 days: 0        Note composed:5:35 PM 03/05/2025

## 2025-03-05 NOTE — TELEPHONE ENCOUNTER
Care Due:                  Date            Visit Type   Department     Provider  --------------------------------------------------------------------------------                                EP -                              PRIMARY      [facility] INTER[facility]  Last Visit: 09-      CARE ([facility])   MEDICINE       Librado Reyes  Next Visit: None Scheduled  None         None Found                                                            Last  Test          Frequency    Reason                     Performed    Due Date  --------------------------------------------------------------------------------    HBA1C.......  6 months...  metFORMIN................  09- 03-    Uric Acid...  12 months..  colchicine,..............  03- 03-    [employer] Embedded Care Due Messages. Reference number: [id number].   3/05/2025 6:01:52 AM CST

## 2025-03-06 NOTE — TELEPHONE ENCOUNTER
Refill Decision Note   Sae Santiago  is requesting a refill authorization.  Brief Assessment and Rationale for Refill:  Approve     Medication Therapy Plan:  FLOS      Pharmacist review requested: Yes   Extended chart review required: Yes   Comments:     Note composed:6:03 PM 03/05/2025

## 2025-03-13 ENCOUNTER — LAB VISIT (OUTPATIENT)
Dept: LAB | Facility: HOSPITAL | Age: 55
End: 2025-03-13
Attending: PEDIATRICS
Payer: COMMERCIAL

## 2025-03-13 DIAGNOSIS — R73.03 PRE-DIABETES: ICD-10-CM

## 2025-03-13 DIAGNOSIS — M10.9 GOUT OF FOOT, UNSPECIFIED CAUSE, UNSPECIFIED CHRONICITY, UNSPECIFIED LATERALITY: ICD-10-CM

## 2025-03-13 DIAGNOSIS — Z12.5 PROSTATE CANCER SCREENING: ICD-10-CM

## 2025-03-13 DIAGNOSIS — E78.5 HYPERLIPIDEMIA, UNSPECIFIED HYPERLIPIDEMIA TYPE: ICD-10-CM

## 2025-03-13 LAB
ALBUMIN SERPL BCP-MCNC: 4.2 G/DL (ref 3.5–5.2)
ALP SERPL-CCNC: 54 U/L (ref 40–150)
ALT SERPL W/O P-5'-P-CCNC: 21 U/L (ref 10–44)
ANION GAP SERPL CALC-SCNC: 10 MMOL/L (ref 8–16)
AST SERPL-CCNC: 17 U/L (ref 10–40)
BILIRUB SERPL-MCNC: 0.6 MG/DL (ref 0.1–1)
BUN SERPL-MCNC: 19 MG/DL (ref 6–20)
CALCIUM SERPL-MCNC: 9.9 MG/DL (ref 8.7–10.5)
CHLORIDE SERPL-SCNC: 103 MMOL/L (ref 95–110)
CHOLEST SERPL-MCNC: 103 MG/DL (ref 120–199)
CHOLEST/HDLC SERPL: 3.2 {RATIO} (ref 2–5)
CO2 SERPL-SCNC: 28 MMOL/L (ref 23–29)
COMPLEXED PSA SERPL-MCNC: 1.3 NG/ML (ref 0–4)
CREAT SERPL-MCNC: 1.2 MG/DL (ref 0.5–1.4)
EST. GFR  (NO RACE VARIABLE): >60 ML/MIN/1.73 M^2
ESTIMATED AVG GLUCOSE: 126 MG/DL (ref 68–131)
GLUCOSE SERPL-MCNC: 125 MG/DL (ref 70–110)
HBA1C MFR BLD: 6 % (ref 4–5.6)
HDLC SERPL-MCNC: 32 MG/DL (ref 40–75)
HDLC SERPL: 31.1 % (ref 20–50)
LDLC SERPL CALC-MCNC: 42 MG/DL (ref 63–159)
NONHDLC SERPL-MCNC: 71 MG/DL
POTASSIUM SERPL-SCNC: 5 MMOL/L (ref 3.5–5.1)
PROT SERPL-MCNC: 7.1 G/DL (ref 6–8.4)
SODIUM SERPL-SCNC: 141 MMOL/L (ref 136–145)
TRIGL SERPL-MCNC: 145 MG/DL (ref 30–150)
URATE SERPL-MCNC: 6.7 MG/DL (ref 3.4–7)

## 2025-03-13 PROCEDURE — 84153 ASSAY OF PSA TOTAL: CPT | Performed by: PEDIATRICS

## 2025-03-13 PROCEDURE — 36415 COLL VENOUS BLD VENIPUNCTURE: CPT | Performed by: PEDIATRICS

## 2025-03-13 PROCEDURE — 84550 ASSAY OF BLOOD/URIC ACID: CPT | Performed by: PEDIATRICS

## 2025-03-13 PROCEDURE — 80053 COMPREHEN METABOLIC PANEL: CPT | Performed by: PEDIATRICS

## 2025-03-13 PROCEDURE — 80061 LIPID PANEL: CPT | Performed by: PEDIATRICS

## 2025-03-13 PROCEDURE — 83036 HEMOGLOBIN GLYCOSYLATED A1C: CPT | Performed by: PEDIATRICS

## 2025-03-20 ENCOUNTER — OFFICE VISIT (OUTPATIENT)
Dept: INTERNAL MEDICINE | Facility: CLINIC | Age: 55
End: 2025-03-20
Payer: COMMERCIAL

## 2025-03-20 VITALS
SYSTOLIC BLOOD PRESSURE: 158 MMHG | OXYGEN SATURATION: 98 % | TEMPERATURE: 97 F | HEIGHT: 73 IN | WEIGHT: 242.75 LBS | HEART RATE: 72 BPM | DIASTOLIC BLOOD PRESSURE: 88 MMHG | BODY MASS INDEX: 32.17 KG/M2

## 2025-03-20 DIAGNOSIS — R16.2 HEPATOSPLENOMEGALY: ICD-10-CM

## 2025-03-20 DIAGNOSIS — I10 HYPERTENSION, UNSPECIFIED TYPE: Primary | ICD-10-CM

## 2025-03-20 DIAGNOSIS — E66.811 OBESITY (BMI 30.0-34.9): ICD-10-CM

## 2025-03-20 DIAGNOSIS — F41.1 GAD (GENERALIZED ANXIETY DISORDER): ICD-10-CM

## 2025-03-20 DIAGNOSIS — R73.03 PRE-DIABETES: ICD-10-CM

## 2025-03-20 DIAGNOSIS — E78.5 HYPERLIPIDEMIA, UNSPECIFIED HYPERLIPIDEMIA TYPE: ICD-10-CM

## 2025-03-20 DIAGNOSIS — M10.9 GOUT OF FOOT, UNSPECIFIED CAUSE, UNSPECIFIED CHRONICITY, UNSPECIFIED LATERALITY: ICD-10-CM

## 2025-03-20 PROCEDURE — 3008F BODY MASS INDEX DOCD: CPT | Mod: CPTII,S$GLB,, | Performed by: NURSE PRACTITIONER

## 2025-03-20 PROCEDURE — 99999 PR PBB SHADOW E&M-EST. PATIENT-LVL V: CPT | Mod: PBBFAC,,, | Performed by: NURSE PRACTITIONER

## 2025-03-20 PROCEDURE — 1159F MED LIST DOCD IN RCRD: CPT | Mod: CPTII,S$GLB,, | Performed by: NURSE PRACTITIONER

## 2025-03-20 PROCEDURE — G2211 COMPLEX E/M VISIT ADD ON: HCPCS | Mod: S$GLB,,, | Performed by: NURSE PRACTITIONER

## 2025-03-20 PROCEDURE — 3044F HG A1C LEVEL LT 7.0%: CPT | Mod: CPTII,S$GLB,, | Performed by: NURSE PRACTITIONER

## 2025-03-20 PROCEDURE — 99215 OFFICE O/P EST HI 40 MIN: CPT | Mod: S$GLB,,, | Performed by: NURSE PRACTITIONER

## 2025-03-20 PROCEDURE — 3077F SYST BP >= 140 MM HG: CPT | Mod: CPTII,S$GLB,, | Performed by: NURSE PRACTITIONER

## 2025-03-20 PROCEDURE — 3079F DIAST BP 80-89 MM HG: CPT | Mod: CPTII,S$GLB,, | Performed by: NURSE PRACTITIONER

## 2025-03-20 NOTE — PROGRESS NOTES
Subjective:       Patient ID: Sae Santiago is a 54 y.o. male.    Chief Complaint: Follow-up (6m f/u)    HPI    1) HTN: B/P good, no HTNive symptoms. Is home monitoring. episodic increased BP is stress induced. Extensive convo ab this. Circular issue with BP,HA,stress. HA relieved by tylenol   2) Anxiety: buspar intermittently working, has been taking 5mg 3x/day and 2.5mg PRN at night to help sleep, avoids Ativan use as much as possible, wife agrees he is doing quiet well. [place]  website shows no Ativan refill in 2 years  3) Gout: last episode was 2023, using tart cherry. He does not wish to take daily allopurinol, wishes to continue lifestyle mod and use PRN colchicine   4) Obesity: following D&E, weight is down, on metformin, fiber supp helps with diarrhea  5) Hyperlipidemia: following D&E, is on 20mg crestor   6) Varicosities of leg: quiet/stable.  7)hepatosplenomegaly: stable. Seeing GI.   8)preDM: no hyperglycemic symptoms. On metformin                Review of Systems   Constitutional:  Negative for activity change, appetite change, chills, diaphoresis, fatigue, fever and unexpected weight change.   HENT:  Negative for congestion, ear pain, postnasal drip, rhinorrhea, sinus pressure, sinus pain, sneezing, sore throat, tinnitus, trouble swallowing and voice change.    Eyes:  Negative for photophobia, pain and visual disturbance.   Respiratory:  Negative for cough, chest tightness, shortness of breath and wheezing.    Cardiovascular:  Negative for chest pain, palpitations and leg swelling.   Gastrointestinal:  Negative for abdominal distention, abdominal pain, constipation, diarrhea, nausea and vomiting.   Genitourinary:  Negative for decreased urine volume, difficulty urinating, dysuria, flank pain, frequency, hematuria and urgency.   Musculoskeletal:  Negative for arthralgias, back pain, joint swelling, neck pain and neck stiffness.   Allergic/Immunologic: Negative for immunocompromised state.    Neurological:  Positive for headaches. Negative for dizziness, tremors, seizures, syncope, facial asymmetry, speech difficulty, weakness, light-headedness and numbness.   Hematological:  Negative for adenopathy. Does not bruise/bleed easily.   Psychiatric/Behavioral:  Negative for confusion and sleep disturbance. The patient is nervous/anxious.        Objective:      Physical Exam  Vitals reviewed.   HENT:      Head: Normocephalic and atraumatic.   Eyes:      Conjunctiva/sclera: Conjunctivae normal.   Cardiovascular:      Rate and Rhythm: Normal rate and regular rhythm.      Heart sounds: Normal heart sounds.   Pulmonary:      Effort: Pulmonary effort is normal.      Breath sounds: Normal breath sounds.   Abdominal:      General: Bowel sounds are normal.      Palpations: Abdomen is soft.   Musculoskeletal:         General: Normal range of motion.      Cervical back: Normal range of motion and neck supple.   Skin:     General: Skin is warm and dry.   Neurological:      Mental Status: He is alert and oriented to person, place, and time.   Psychiatric:         Mood and Affect: Mood is anxious.         Speech: Speech is rapid and pressured.         Assessment:     Vitals:    03/20/25 0855   BP: (!) 158/88   Pulse:    Temp:          1. Hypertension, unspecified type    2. Obesity (BMI 30.0-34.9)    3. Pre-diabetes    4. Hyperlipidemia, unspecified hyperlipidemia type    5. ZORAN (generalized anxiety disorder)    6. Gout of foot, unspecified cause, unspecified chronicity, unspecified laterality    7. Hepatosplenomegaly        Plan:   Hypertension, unspecified type  -     Comprehensive Metabolic Panel; Future; Expected date: 09/16/2025  -     Lipid Panel; Future; Expected date: 09/16/2025  -     CBC Auto Differential; Future; Expected date: 09/16/2025    Obesity (BMI 30.0-34.9)    Pre-diabetes  -     Hemoglobin A1C; Future; Expected date: 09/16/2025    Hyperlipidemia, unspecified hyperlipidemia type    ZORAN (generalized  anxiety disorder)    Gout of foot, unspecified cause, unspecified chronicity, unspecified laterality    Hepatosplenomegaly      Try to take full 10 mg of buspar at night. Does not want to take it during the day/ c/w 5 mg dose during the day  We discussed that he had serotonin syndrome in the past due to lexapro and had a difficult time tapering off of zoloft in the past, so no serotonergic antidepressants preferred, we discussed wellbutrin- he plans to consider this and notify the office if he decides  Considering GLP 1s. Weary of side effects  Working on diet/exercise/weight loss  Return in 6 mo w labs  Chronic complex mgt done

## 2025-04-15 DIAGNOSIS — R73.03 PRE-DIABETES: ICD-10-CM

## 2025-04-15 RX ORDER — METFORMIN HYDROCHLORIDE 500 MG/1
1000 TABLET ORAL 2 TIMES DAILY WITH MEALS
Qty: 360 TABLET | Refills: 1 | Status: SHIPPED | OUTPATIENT
Start: 2025-04-15

## 2025-04-15 NOTE — TELEPHONE ENCOUNTER
Refill Routing Note   Medication(s) are not appropriate for processing by [facility] for the following reason(s):        Drug-disease interaction    [facility] action(s):  Defer        Medication Therapy Plan: Drug-Disease: metFORMIN and Hepatosplenomegaly; DEFER    Pharmacist review requested: Yes     Appointments  past 12m or future 3m with PCP    Date Provider   Last Visit   9/20/2024 Librado Reyes MD   Next Visit   10/2/2025 Librado Reyes MD   ED visits in past 90 days: 0        Note composed:8:39 AM 04/15/2025

## 2025-04-15 NOTE — TELEPHONE ENCOUNTER
No care due was identified.  [facility] Embedded Care Due Messages. Reference number: [id number].   4/15/2025 6:02:50 AM CDT

## 2025-04-15 NOTE — TELEPHONE ENCOUNTER
Refill Decision Note   Sae Santiago  is requesting a refill authorization.  Brief Assessment and Rationale for Refill:  Approve     Medication Therapy Plan:         Pharmacist review requested: Yes   Comments:     Note composed:9:58 AM 04/15/2025

## 2025-05-06 DIAGNOSIS — I10 WHITE COAT SYNDROME WITH DIAGNOSIS OF HYPERTENSION: ICD-10-CM

## 2025-05-07 RX ORDER — LOSARTAN POTASSIUM 100 MG/1
100 TABLET ORAL DAILY
Qty: 90 TABLET | Refills: 3 | Status: SHIPPED | OUTPATIENT
Start: 2025-05-07

## 2025-05-07 RX ORDER — METOPROLOL SUCCINATE 25 MG/1
TABLET, EXTENDED RELEASE ORAL
Qty: 135 TABLET | Refills: 3 | Status: SHIPPED | OUTPATIENT
Start: 2025-05-07

## 2025-05-07 NOTE — TELEPHONE ENCOUNTER
Refill Routing Note   Medication(s) are not appropriate for processing by [facility] for the following reason(s):        Required vitals abnormal    [facility] action(s):  Defer               Appointments  past 12m or future 3m with PCP    Date Provider   Last Visit   9/20/2024 Librado Reyes MD   Next Visit   10/2/2025 Librado Reyes MD   ED visits in past 90 days: 0        Note composed:8:11 AM 05/07/2025

## 2025-05-07 NOTE — TELEPHONE ENCOUNTER
No care due was identified.  [facility] Embedded Care Due Messages. Reference number: [id number].   5/06/2025 10:35:20 PM CDT

## 2025-05-28 ENCOUNTER — PATIENT OUTREACH (OUTPATIENT)
Dept: ADMINISTRATIVE | Facility: HOSPITAL | Age: 55
End: 2025-05-28
Payer: COMMERCIAL

## 2025-05-28 NOTE — PROGRESS NOTES
VBC OUTREACH: per chart review pt is OVERDUE for BP check, pt already has a PCP appt scheduled on 10.2.25.

## 2025-07-03 ENCOUNTER — PATIENT MESSAGE (OUTPATIENT)
Dept: DERMATOLOGY | Facility: CLINIC | Age: 55
End: 2025-07-03
Payer: COMMERCIAL

## 2025-07-04 ENCOUNTER — ON-DEMAND VIRTUAL (OUTPATIENT)
Dept: URGENT CARE | Facility: CLINIC | Age: 55
End: 2025-07-04
Payer: COMMERCIAL

## 2025-07-04 DIAGNOSIS — J34.0 NASAL ABSCESS: Primary | ICD-10-CM

## 2025-07-04 RX ORDER — CLINDAMYCIN HYDROCHLORIDE 300 MG/1
300 CAPSULE ORAL EVERY 12 HOURS
Qty: 14 CAPSULE | Refills: 0 | Status: SHIPPED | OUTPATIENT
Start: 2025-07-04 | End: 2025-07-11

## 2025-07-04 NOTE — PROGRESS NOTES
Subjective:      Patient ID: Sae Santiago is a 54 y.o. male.    Vitals:  vitals were not taken for this visit.     Chief Complaint: nasal sore      Visit Type: TELE AUDIOVISUAL - This visit was conducted virtually based on  subjective information and limited objective exam    Present with the patient at the time of consultation: TELEMED PRESENT WITH PATIENT: None  LOCATION OF PATIENT [place]  Two patient identifiers used to verify patient- saying out date of birth and full name.       Past Medical History:   Diagnosis Date    Anxiety     Benign paroxysmal vertigo, bilateral     GERD (gastroesophageal reflux disease)     Hyperlipidemia     Hypertension      Past Surgical History:   Procedure Laterality Date    ANTERIOR CERVICAL DISCECTOMY W/ FUSION Bilateral 1/9/2020    Procedure: DISCECTOMY, SPINE, CERVICAL, ANTERIOR APPROACH, WITH FUSION  ;  Surgeon: Ari Grossman MD;  Location: [facility] OR;  Service: Neurosurgery;  Laterality: Bilateral;    COLONOSCOPY N/A 12/7/2021    Procedure: COLONOSCOPY;  Surgeon: Nirmala Mehta MD;  Location: [facility] ENDO;  Service: Endoscopy;  Laterality: N/A;    EPIDURAL STEROID INJECTION INTO CERVICAL SPINE N/A 12/13/2019    Procedure: C7/T1 IL MOE;  Surgeon: Parveen Goldstein MD;  Location: [facility] PAIN MGT;  Service: Pain Management;  Laterality: N/A;    KNEE SURGERY Left     SURGICAL REMOVAL OF PILONIDAL CYST      SURGICAL REMOVAL OF VERTEBRAL BODY OF CERVICAL SPINE  1/9/2020    Procedure: CORPECTOMY, SPINE, CERVICAL;  Surgeon: Ari Grossman MD;  Location: [facility] OR;  Service: Neurosurgery;;    TONSILLECTOMY       Review of patient's allergies indicates:   Allergen Reactions    Lexapro [escitalopram oxalate]      Serotonin Syndrome ( Pt was seen in the emergency department)    Sulfa (sulfonamide antibiotics)      Room spinning with cold sweats    Sulfamethoxazole-trimethoprim      Medications Ordered Prior to Encounter[1]  Family History   Problem Relation Name Age of  Onset    Cataracts Father      Hypertension Father      Melanoma Neg Hx      Psoriasis Neg Hx      Lupus Neg Hx      Eczema Neg Hx      Acne Neg Hx         Medications Ordered                [id number] [address]   [address] [place] [address]    Telephone: [phone]   Fax: [phone]   Hours: Not open 24 hours                         E-Prescribed (1 of 1)              clindamycin (CLEOCIN) 300 MG capsule    Sig: Take 1 capsule (300 mg total) by mouth every 12 (twelve) hours. for 7 days       Start: 7/4/25     Quantity: 14 capsule Refills: 0                           Ohs Peq Odvv Intake    7/4/2025 11:45 AM CDT - Filed by Patient   What is your current physical address in the event of a medical emergency? [address]   Are you able to take your vital signs? No   Please attach any relevant images or files    Is your employer contracted with [facility]? No         53 yo male with c/o reoccurring nasal infection. He states he went to dermatology and they gave him doxycyline he states resolved but returned. He went to urgent care and they gave him azithromycin. He states it has re-occurred to same nostril/left and can feel a bump. He states he does nasal  and applying bactroban. He states doxycycline caused side effects.       ROS     Objective:   The physical exam was conducted virtually.    AAO x 3 ; no acute distress noted; appearance normal; mood and behavior normal; thought process normal  Head- normocephalic  Nose- appears normal, no discharge or erythema  Eyes- pupils appear normal in size, no drainage, no erythema  Ears- normal appearing; no discharge, no erythema  Mouth- appears normal  Oropharynx- no erythema, lesions  Lungs- breathing at a normal rate, no acute distress noted  Heart- no reports of tachycardia, palpitations, chest pain  Abdomen- non distended, non tender reported by patient  Skin- warm and dry, no  erythema or edema noted by patient or visualized  Psych- as above; no si/hi      Assessment:     1. Nasal abscess        Plan:     Clean face with hibiclins 3 times a week  Continue bactroban  Clindamycin bid x 7 days.   Will do clindamycin since adverse reaction to doxycycline and allergic to sulfa meds.     Thank you for choosing [facility]!    Our goal in the [facility] is to always provide outstanding medical care. You may receive a survey by mail or e-mail in the next week regarding your experience today. We would greatly appreciate you completing and returning the survey. Your feedback provides us with a way to recognize our staff who provide very good care, and it helps us learn how to improve when your experience was below our aspiration of excellence.         We appreciate you trusting us with your medical care. We hope you feel better soon. We will be happy to take care of you for all of your future medical needs.    You must understand that you've received an Urgent Care treatment only and that you may be released before all your medical problems are known or treated. You, the patient, will arrange for follow up care as instructed.    Follow up with your PCP or specialty clinic as directed in the next 1-2 weeks if not improved or as needed.  You can call [phone] to schedule an appointment with the appropriate provider.    If your condition worsens we recommend that you receive another evaluation in person, with your primary care provider, urgent care or at the emergency room immediately or contact your primary medical clinics after hours call service to discuss your concerns.         Nasal abscess  -     clindamycin (CLEOCIN) 300 MG capsule; Take 1 capsule (300 mg total) by mouth every 12 (twelve) hours. for 7 days  Dispense: 14 capsule; Refill: 0                         [1]   Current Outpatient Medications on File Prior to Visit   Medication Sig Dispense  Refill    busPIRone (BUSPAR) 10 MG tablet Take 1 tablet (10 mg total) by mouth 3 (three) times daily. 270 tablet 3    colchicine, gout, (COLCRYS) 0.6 mg tablet Take 2 tablets at onset and repeat 1 tablet in 1 hour 12 tablet 1    hydroCHLOROthiazide (HYDRODIURIL) 25 MG tablet Take 1 tablet by mouth once daily 90 tablet 1    losartan (COZAAR) 100 MG tablet Take 1 tablet (100 mg total) by mouth once daily. 90 tablet 3    meclizine (ANTIVERT) 25 mg tablet Take 1 tablet (25 mg total) by mouth 3 (three) times daily as needed for Dizziness. 21 tablet 0    metFORMIN (GLUCOPHAGE) 500 MG tablet TAKE 2 TABLETS BY MOUTH TWICE DAILY WITH MEALS 360 tablet 1    metoprolol succinate (TOPROL-XL) 25 MG 24 hr tablet TAKE 1 and 1/2 (ONE & ONE-HALF) TABLETS BY MOUTH AT NIGHT 135 tablet 3    multivitamin (THERAGRAN) per tablet Take 1 tablet by mouth once daily.      naproxen (NAPROSYN) 500 MG tablet Take 1 tablet (500 mg total) by mouth 2 (two) times daily. 20 tablet 0    rosuvastatin (CRESTOR) 40 MG Tab TAKE 1 TABLET BY MOUTH ONCE DAILY IN THE EVENING 90 tablet 3     No current facility-administered medications on file prior to visit.

## 2025-07-29 ENCOUNTER — PATIENT OUTREACH (OUTPATIENT)
Dept: ADMINISTRATIVE | Facility: HOSPITAL | Age: 55
End: 2025-07-29
Payer: COMMERCIAL

## 2025-07-29 DIAGNOSIS — I10 WHITE COAT SYNDROME WITH DIAGNOSIS OF HYPERTENSION: ICD-10-CM

## 2025-07-30 RX ORDER — METOPROLOL SUCCINATE 25 MG/1
TABLET, EXTENDED RELEASE ORAL
Qty: 135 TABLET | Refills: 3 | Status: SHIPPED | OUTPATIENT
Start: 2025-07-30

## 2025-07-30 NOTE — TELEPHONE ENCOUNTER
Care Due:                  Date            Visit Type   Department     Provider  --------------------------------------------------------------------------------                                EP -                              PRIMARY      [facility] INTERNAL  Last Visit: 09-      CARE ([facility])   [facility]       Librado Reyes                              EP -                              PRIMARY      [facility] INTERNAL  Next Visit: 10-      CARE ([facility])   [facility]       Librado Reyes                                                            Last  Test          Frequency    Reason                     Performed    Due Date  --------------------------------------------------------------------------------    HBA1C.......  6 months...  metFORMIN................  03- 09-    [facility] Embedded Care Due Messages. Reference number: [id number].   7/29/2025 10:13:21 PM CDT

## 2025-08-28 DIAGNOSIS — I10 WHITE COAT SYNDROME WITH DIAGNOSIS OF HYPERTENSION: ICD-10-CM

## 2025-08-28 RX ORDER — HYDROCHLOROTHIAZIDE 25 MG/1
25 TABLET ORAL
Qty: 90 TABLET | Refills: 3 | Status: SHIPPED | OUTPATIENT
Start: 2025-08-28

## (undated) DEVICE — REMOVER LOTION

## (undated) DEVICE — SEE MEDLINE ITEM 157117

## (undated) DEVICE — DRAPE STERI INSTRUMENT 1018

## (undated) DEVICE — ELECTRODE REM PLYHSV RETURN 9

## (undated) DEVICE — COVER OVERHEAD SURG LT BLUE

## (undated) DEVICE — HALTER DELUXE DISCARD HEAD

## (undated) DEVICE — POSITIONER HEAD DONUT 9IN FOAM

## (undated) DEVICE — PIN DISTRACTION 12MM
Type: IMPLANTABLE DEVICE | Site: SPINE CERVICAL | Status: NON-FUNCTIONAL
Removed: 2020-01-09

## (undated) DEVICE — GAUZE SPONGE PEANUT STRL

## (undated) DEVICE — DURAPREP SURG SCRUB 6ML

## (undated) DEVICE — NDL SAFETY 22G X 1.5 ECLIPSE

## (undated) DEVICE — MATRIX FLOSEAL HEMOSTATIC 10ML

## (undated) DEVICE — SYR ONLY LUER LOCK 20CC

## (undated) DEVICE — PROBE BALL TIP 2.3MM

## (undated) DEVICE — MANIFOLD 4 PORT

## (undated) DEVICE — SUT VICRYL+ 2-0 SH 18IN

## (undated) DEVICE — NDL SPINAL 20GX3.5 HUB

## (undated) DEVICE — HOLDING PIN

## (undated) DEVICE — SEE MEDLINE ITEM 157131

## (undated) DEVICE — SUT BONE WAX 2.5 GRMS 12/BX

## (undated) DEVICE — SUT VICRYL PLUS 3-0 SH 18IN

## (undated) DEVICE — CORD BIPOLAR ELECTROSURGICAL

## (undated) DEVICE — SEE MEDLINE ITEM 152622

## (undated) DEVICE — SPONGE NEURO 1/4X1/4

## (undated) DEVICE — UNDERGLOVES BIOGEL PI SIZE 8

## (undated) DEVICE — SUCTION HEMOVAC SMALL

## (undated) DEVICE — ADHESIVE MASTISOL VIAL 48/BX

## (undated) DEVICE — GAUZE SPONGE 4X4 12PLY

## (undated) DEVICE — DRAPE OPMI STERILE

## (undated) DEVICE — BUR LEGEND MIDAS REX 14CM 13MM

## (undated) DEVICE — BLADE EZ CLEAN 2.5IN MODIFIED

## (undated) DEVICE — SEE MEDLINE ITEM 157027

## (undated) DEVICE — SUPPORT ULNA NERVE PROTECTOR

## (undated) DEVICE — SPONGE GAUZE 16PLY 4X4

## (undated) DEVICE — SUT MONOCRYL 4.0 PS2 CP496G

## (undated) DEVICE — GLOVE SURGICAL LATEX SZ 7

## (undated) DEVICE — UNDERGLOVE BIOGEL PI SZ 6.5 LF

## (undated) DEVICE — ALCOHOL 70% ISOP RUBBING 4OZ

## (undated) DEVICE — ADHESIVE DERMABOND ADVANCED

## (undated) DEVICE — SEE MEDLINE ITEM 152739

## (undated) DEVICE — DRESSING SURGICAL 1X1

## (undated) DEVICE — SUT SILK 3-0 STRANDS 30IN

## (undated) DEVICE — GLOVE BIOGEL ECLIPSE SZ 8

## (undated) DEVICE — SKIN MARKER DEVON 160

## (undated) DEVICE — DRESSING SURGICAL 1/2X1/2

## (undated) DEVICE — SHEET THYROID W/ISO-BAC

## (undated) DEVICE — DRAPE MOBILE C-ARM

## (undated) DEVICE — SEE MEDLINE ITEM 157194

## (undated) DEVICE — DRAPE INCISE IOBAN 2 23X17IN